# Patient Record
Sex: MALE | Race: WHITE | NOT HISPANIC OR LATINO | Employment: OTHER | URBAN - METROPOLITAN AREA
[De-identification: names, ages, dates, MRNs, and addresses within clinical notes are randomized per-mention and may not be internally consistent; named-entity substitution may affect disease eponyms.]

---

## 2021-12-20 LAB — HBA1C MFR BLD: 7.5 % (ref 0–5.6)

## 2022-03-09 ENCOUNTER — TELEPHONE (OUTPATIENT)
Dept: SCHEDULING | Facility: IMAGING CENTER | Age: 77
End: 2022-03-09

## 2022-04-11 ENCOUNTER — OFFICE VISIT (OUTPATIENT)
Dept: MEDICAL GROUP | Facility: PHYSICIAN GROUP | Age: 77
End: 2022-04-11
Payer: MEDICARE

## 2022-04-11 VITALS
WEIGHT: 131 LBS | DIASTOLIC BLOOD PRESSURE: 70 MMHG | OXYGEN SATURATION: 95 % | HEART RATE: 106 BPM | BODY MASS INDEX: 20.56 KG/M2 | SYSTOLIC BLOOD PRESSURE: 138 MMHG | RESPIRATION RATE: 18 BRPM | HEIGHT: 67 IN | TEMPERATURE: 98.5 F

## 2022-04-11 DIAGNOSIS — L40.50 PSORIATIC ARTHRITIS (HCC): Primary | ICD-10-CM

## 2022-04-11 DIAGNOSIS — G47.52 REM SLEEP BEHAVIOR DISORDER: ICD-10-CM

## 2022-04-11 DIAGNOSIS — E78.2 MIXED HYPERLIPIDEMIA: ICD-10-CM

## 2022-04-11 DIAGNOSIS — H61.23 BILATERAL IMPACTED CERUMEN: ICD-10-CM

## 2022-04-11 DIAGNOSIS — L60.0 INGROWN TOENAIL OF RIGHT FOOT: ICD-10-CM

## 2022-04-11 DIAGNOSIS — E11.9 TYPE 2 DIABETES MELLITUS WITHOUT COMPLICATION, WITHOUT LONG-TERM CURRENT USE OF INSULIN (HCC): ICD-10-CM

## 2022-04-11 DIAGNOSIS — Z79.899 HIGH RISK MEDICATION USE: ICD-10-CM

## 2022-04-11 PROBLEM — H61.20 CERUMEN IMPACTION: Status: ACTIVE | Noted: 2022-04-11

## 2022-04-11 PROCEDURE — 99214 OFFICE O/P EST MOD 30 MIN: CPT | Performed by: NURSE PRACTITIONER

## 2022-04-11 ASSESSMENT — PATIENT HEALTH QUESTIONNAIRE - PHQ9: CLINICAL INTERPRETATION OF PHQ2 SCORE: 0

## 2022-04-11 NOTE — ASSESSMENT & PLAN NOTE
Chronic condition, stable.  Patient indicates that he has an ingrown toenail that comes and goes.  This is affecting his right great toe.  Patient would like to establish with podiatry, and referral was placed today.

## 2022-04-12 PROBLEM — E78.2 MIXED HYPERLIPIDEMIA: Status: ACTIVE | Noted: 2022-04-12

## 2022-04-12 RX ORDER — GLIMEPIRIDE 2 MG/1
2 TABLET ORAL
COMMUNITY
End: 2022-09-26 | Stop reason: SDUPTHER

## 2022-04-12 RX ORDER — INFLIXIMAB 100 MG/10ML
600 INJECTION, POWDER, LYOPHILIZED, FOR SOLUTION INTRAVENOUS
COMMUNITY

## 2022-04-12 RX ORDER — MELATONIN 10 MG
20 CAPSULE ORAL NIGHTLY
COMMUNITY

## 2022-04-12 RX ORDER — FOLIC ACID 1 MG/1
1 TABLET ORAL DAILY
COMMUNITY
End: 2022-05-12 | Stop reason: SDUPTHER

## 2022-04-12 RX ORDER — LORATADINE 10 MG/1
10 TABLET ORAL DAILY
COMMUNITY
End: 2022-06-28

## 2022-04-12 RX ORDER — ASPIRIN 81 MG/1
81 TABLET, CHEWABLE ORAL DAILY
COMMUNITY
End: 2024-01-16

## 2022-04-12 RX ORDER — CLONAZEPAM 1 MG/1
1 TABLET ORAL NIGHTLY
COMMUNITY
End: 2022-08-18

## 2022-04-12 RX ORDER — OMEPRAZOLE 20 MG/1
20 CAPSULE, DELAYED RELEASE ORAL DAILY
COMMUNITY
End: 2023-05-02 | Stop reason: SDUPTHER

## 2022-04-12 RX ORDER — CALCIUM POLYCARBOPHIL 625 MG 625 MG/1
625 TABLET ORAL DAILY
COMMUNITY
End: 2022-06-28

## 2022-04-12 RX ORDER — PRAVASTATIN SODIUM 20 MG
20 TABLET ORAL NIGHTLY
COMMUNITY
End: 2022-09-26 | Stop reason: SDUPTHER

## 2022-04-12 RX ORDER — SILDENAFIL CITRATE 20 MG/1
20 TABLET ORAL PRN
COMMUNITY
End: 2022-11-02 | Stop reason: SDUPTHER

## 2022-04-12 RX ORDER — CYCLOSPORINE 100 MG/1
100 CAPSULE, LIQUID FILLED ORAL DAILY
COMMUNITY
End: 2022-06-28

## 2022-04-12 RX ORDER — M-VIT,TX,IRON,MINS/CALC/FOLIC 27MG-0.4MG
1 TABLET ORAL DAILY
COMMUNITY
End: 2024-01-16

## 2022-04-12 NOTE — ASSESSMENT & PLAN NOTE
Chronic condition, stable.  Patient takes pravastatin 20 mg daily.  He tolerates this without adverse side effect.  Patient reports he is unsure whether he has high cholesterol as believes his labs are within normal limits.  He is due for labs, and these were ordered today.  We will continue his current regimen.

## 2022-04-12 NOTE — ASSESSMENT & PLAN NOTE
Chronic condition, stable.  Patient reports that he has constant bilateral cerumen impaction.  He was established with an ENT in [place] who manages this, and performed routine ear irrigations.  He would like to be referred to ENT in this area to establish, and order was placed for him today.

## 2022-04-12 NOTE — PROGRESS NOTES
Subjective:     CC: The primary encounter diagnosis was Psoriatic arthritis (HCC). Diagnoses of Type 2 diabetes mellitus without complication, without long-term current use of insulin (HCC), REM sleep behavior disorder, Bilateral impacted cerumen, Ingrown toenail of right foot, Mixed hyperlipidemia, and High risk medication use were also pertinent to this visit.      HPI:   Phuc is a new patient to me today wanting to establish care. He and his wife have recently relocated here from [facility] last week.  We discussed the following problems:    1. Psoriatic arthritis (HCC)  Chronic condition, stable.  Patient was followed by rheumatology in [place], needs referral for transfer of care to rheumatology here.    2. Type 2 diabetes mellitus without complication, without long-term current use of insulin (HCC)  Chronic condition, stable.  Patient here for evaluation today.    3. REM sleep behavior disorder  Chronic condition, stable.  Patient was followed by sleep medicine, neurology, and psychology in [place], needs referral for specialty management here.    4. Bilateral impacted cerumen  Chronic condition, stable.  Patient has established relationship with ENT in [place] for management of chronic cerumen impaction, needs referral for ENT here.    5. Ingrown toenail of right foot  Chronic condition, stable.  Patient has chronic ingrown right great toenail, and needs referral to podiatry here.    6. Mixed hyperlipidemia  Chronic condition, stable.  Patient here for evaluation today.    7. High risk medication use  Lab orders placed for surveillance of high risk medication use.       Past Medical History:   Diagnosis Date   • Diabetes (HCC)    • Psoriatic arthritis (HCC)    • REM sleep behavior disorder        Social History     Tobacco Use   • Smoking status: Former Smoker   • Smokeless tobacco: Never Used   • Tobacco comment: quit in 1994   Vaping Use   • Vaping Use: Never used   Substance Use Topics   •  Alcohol use: Yes     Comment: occasional glass of wine   • Drug use: Never       Current Outpatient Medications Ordered in Epic   Medication Sig Dispense Refill   • glimepiride (AMARYL) 2 MG Tab Take 2 mg by mouth 2 (two) times a day.     • methotrexate 2.5 MG Tab Take 7.5 mg by mouth every 7 days.     • pravastatin (PRAVACHOL) 20 MG Tab Take 20 mg by mouth every evening.     • cycloSPORINE-modified (NEORAL) 100 MG Cap Take 100 mg by mouth every day. Except on Saturday     • inFLIXimab (REMICADE) 100 MG Recon Soln Infuse 600 mg into a venous catheter every 8 weeks.     • folic acid (FOLVITE) 1 MG Tab Take 1 mg by mouth every day.     • clonazePAM (KLONOPIN) 1 MG Tab Take 1 mg by mouth every evening. REM sleep disorder     • therapeutic multivitamin-minerals (THERAGRAN-M) Tab Take 1 Tablet by mouth every day.     • calcium polycarbophil (FIBERCON) 625 MG Tab Take 625 mg by mouth every day.     • omeprazole (PRILOSEC) 20 MG delayed-release capsule Take 20 mg by mouth every day.     • aspirin (ASA) 81 MG Chew Tab chewable tablet Chew 81 mg every day.     • loratadine (CLARITIN) 10 MG Tab Take 10 mg by mouth every day.     • Melatonin 10 MG Cap Take 20 mg by mouth every evening.     • Acetaminophen (TYLENOL ARTHRITIS PAIN PO) Take 1,250 mg by mouth every day.     • metformin (GLUCOPHAGE) 1000 MG tablet Take 1,000 mg by mouth 2 times a day with meals.     • sildenafil (REVATIO) 20 MG tablet Take 20 mg by mouth as needed. Three as needed       No current Epic-ordered facility-administered medications on file.       Allergies:  Patient has no known allergies.    Health Maintenance: Deferred until records can be obtained from [facility].    ROS:  Gen: no fevers/chills, no changes in weight  Eyes: no changes in vision  ENT: no sore throat, no hearing loss, no bloody nose  Pulm: no sob, no cough  CV: no chest pain, no palpitations  GI: no nausea/vomiting, no diarrhea  : no dysuria  MSk: no myalgias  Skin: no  "rash  Neuro: no headaches, no numbness/tingling  Heme/Lymph: no easy bruising      Objective:       Exam:  /70 (BP Location: Right arm, Patient Position: Sitting, BP Cuff Size: Adult)   Pulse (!) 106   Temp 36.9 °C (98.5 °F) (Temporal)   Resp 18   Ht 1.702 m (5' 7\")   Wt 59.4 kg (131 lb)   SpO2 95%   BMI 20.52 kg/m²  Body mass index is 20.52 kg/m².    Gen: Alert and oriented, No apparent distress.  Neck: Neck is supple without lymphadenopathy.  No carotid bruits.  Lungs: Normal effort, CTA bilaterally, no wheezes, rhonchi, or rales  CV: Regular rate and rhythm. No murmurs, rubs, or gallops.  Ext: No clubbing, cyanosis, edema.    Labs: None available.    Assessment & Plan:     76 y.o. male with the following -     Ingrown toenail of right foot  Chronic condition, stable.  Patient indicates that he has an ingrown toenail that comes and goes.  This is affecting his right great toe.  Patient would like to establish with podiatry, and referral was placed today.    Cerumen impaction  Chronic condition, stable.  Patient reports that he has constant bilateral cerumen impaction.  He was established with an ENT in [place] who manages this, and performed routine ear irrigations.  He would like to be referred to ENT in this area to establish, and order was placed for him today.    REM sleep behavior disorder  Chronic condition, stable.  Patient has a long history of REM sleep behavior disorder.  He states that he acts out his dreams.  He has been managed by sleep medicine, neurology, and psychiatry specialties.  He is on a stable regimen of clonazepam 1 mg and melatonin 20 mg at bedtime.  He would like to be referred to specialty for management of this today.  He does have approximately 30 days worth of clonazepam left at this time.  Discussed that I will refill this for him if he cannot get into specialty prior.  I did advise patient that if I do need to refill his clonazepam, he will need to make a separate " appointment for that and signed controlled substance consents.  Phone call was made to our sleep center, who reports this is not a condition they manage.  Referral was placed to psychiatry.    Psoriatic arthritis (HCC)  Chronic condition, stable.  Patient reports an over 10-year history of psoriatic arthritis.  He states he does well with his current regimen of methotrexate 7.5 mg weekly, as well as IV infusions of Remicade 600 mg.  Patient states he was on Remicade 700 mg, but dose was reduced due to weight loss.  His next infusion is due on May 5.  Patient does report that he needs to get into rheumatology soon as possible due to this.  We did discuss that this can be a difficult specialty to get into in this area, however there is no availability through [facility] rheumatology, and I will base an urgent referral for him today.  Patient has recently relocated from the [place] area, and does not have his health records with him at this time.  There are prior rheumatologist does participate in [employer] and care everywhere application, and as soon as we figure out how to share information, his records should be available to us.  Patient believes it has been approximately 3 months or little longer since monitoring labs were done.  These orders were placed today.  I did tell patient I can order his Remicade at our infusion center if we cannot get him into see rheumatology in time.  Patient will continue current regimen.    Addendum: Since the time that I saw this patient in this dictation, the rheumatology office has reached out to let me know that they cannot see patient without records.  If I cannot get his records, they would like me to do a standard rheumatology work-up as well as x-rays of all of his affected joints.  Will call patient and let him know.    Type 2 diabetes mellitus without complication, without long-term current use of insulin (HCC)  Chronic condition, stable.  Patient takes glimepiride 2 mg twice  daily, and Metformin 1000 mg twice daily.  He believes his last hemoglobin A1c was 7.4.  He also thinks this was a little over 3 months ago. Discussed that I would like to see his A1c under 7.  Discussed with patient that he is due for updated labs, and depending on his next A1c we may need to make a medication adjustment.  Also discussed with patient that we do have an excellent pharmacotherapy service that could see him in this location for management of his diabetes medications.  Patient will follow-up after his labs are available and we can make further determination at that time.  Continue his current regimen.    Mixed hyperlipidemia  Chronic condition, stable.  Patient takes pravastatin 20 mg daily.  He tolerates this without adverse side effect.  Patient reports he is unsure whether he has high cholesterol as believes his labs are within normal limits.  He is due for labs, and these were ordered today.  We will continue his current regimen.      Orders Placed This Encounter   • Comp Metabolic Panel   • HEMOGLOBIN A1C   • Lipid Profile   • CBC WITH DIFFERENTIAL   • Referral to Rheumatology   • Referral to ENT   • Referral to Podiatry   • Referral to Ophthalmology   • Referral to Psychiatry   • glimepiride (AMARYL) 2 MG Tab   • methotrexate 2.5 MG Tab   • pravastatin (PRAVACHOL) 20 MG Tab   • cycloSPORINE-modified (NEORAL) 100 MG Cap   • inFLIXimab (REMICADE) 100 MG Recon Soln   • folic acid (FOLVITE) 1 MG Tab   • clonazePAM (KLONOPIN) 1 MG Tab   • therapeutic multivitamin-minerals (THERAGRAN-M) Tab   • calcium polycarbophil (FIBERCON) 625 MG Tab   • omeprazole (PRILOSEC) 20 MG delayed-release capsule   • aspirin (ASA) 81 MG Chew Tab chewable tablet   • loratadine (CLARITIN) 10 MG Tab   • Melatonin 10 MG Cap   • Acetaminophen (TYLENOL ARTHRITIS PAIN PO)   • metformin (GLUCOPHAGE) 1000 MG tablet   • sildenafil (REVATIO) 20 MG tablet          Return in about 3 months (around 7/11/2022).    I have placed the below  orders and discussed them with an approved delegating provider.  The MA is performing the below orders under the direction of Ingris Medrano MD.    Please note that this dictation was created using voice recognition software. I have made every reasonable attempt to correct obvious errors, but I expect that there are errors of grammar and possibly content that I did not discover before finalizing the note.

## 2022-04-12 NOTE — ASSESSMENT & PLAN NOTE
Chronic condition, stable.  Patient has a long history of REM sleep behavior disorder.  He states that he acts out his dreams.  He has been managed by sleep medicine, neurology, and psychiatry specialties.  He is on a stable regimen of clonazepam 1 mg and melatonin 20 mg at bedtime.  He would like to be referred to specialty for management of this today.  He does have approximately 30 days worth of clonazepam left at this time.  Discussed that I will refill this for him if he cannot get into specialty prior.  I did advise patient that if I do need to refill his clonazepam, he will need to make a separate appointment for that and signed controlled substance consents.  Phone call was made to our sleep center, who reports this is not a condition they manage.  Referral was placed to psychiatry.

## 2022-04-12 NOTE — ASSESSMENT & PLAN NOTE
Chronic condition, stable.  Patient takes glimepiride 2 mg twice daily, and Metformin 1000 mg twice daily.  He believes his last hemoglobin A1c was 7.4.  He also thinks this was a little over 3 months ago. Discussed that I would like to see his A1c under 7.  Discussed with patient that he is due for updated labs, and depending on his next A1c we may need to make a medication adjustment.  Also discussed with patient that we do have an excellent pharmacotherapy service that could see him in this location for management of his diabetes medications.  Patient will follow-up after his labs are available and we can make further determination at that time.  Continue his current regimen.

## 2022-04-12 NOTE — ASSESSMENT & PLAN NOTE
Chronic condition, stable.  Patient reports an over 10-year history of psoriatic arthritis.  He states he does well with his current regimen of methotrexate 7.5 mg weekly, as well as IV infusions of Remicade 600 mg.  Patient states he was on Remicade 700 mg, but dose was reduced due to weight loss.  His next infusion is due on May 5.  Patient does report that he needs to get into rheumatology soon as possible due to this.  We did discuss that this can be a difficult specialty to get into in this area, however there is no availability through [facility] rheumatology, and I will base an urgent referral for him today.  Patient has recently relocated from the [place] area, and does not have his health records with him at this time.  There are prior rheumatologist does participate in [employer] and care everywhere application, and as soon as we figure out how to share information, his records should be available to us.  Patient believes it has been approximately 3 months or little longer since monitoring labs were done.  These orders were placed today.  I did tell patient I can order his Remicade at our infusion center if we cannot get him into see rheumatology in time.  Patient will continue current regimen.    Addendum: Since the time that I saw this patient in this dictation, the rheumatology office has reached out to let me know that they cannot see patient without records.  If I cannot get his records, they would like me to do a standard rheumatology work-up as well as x-rays of all of his affected joints.  Will call patient and let him know.

## 2022-04-15 ENCOUNTER — HOSPITAL ENCOUNTER (OUTPATIENT)
Dept: LAB | Facility: MEDICAL CENTER | Age: 77
End: 2022-04-15
Attending: NURSE PRACTITIONER
Payer: MEDICARE

## 2022-04-15 DIAGNOSIS — Z79.899 HIGH RISK MEDICATION USE: ICD-10-CM

## 2022-04-15 DIAGNOSIS — E78.2 MIXED HYPERLIPIDEMIA: ICD-10-CM

## 2022-04-15 DIAGNOSIS — E11.9 TYPE 2 DIABETES MELLITUS WITHOUT COMPLICATION, WITHOUT LONG-TERM CURRENT USE OF INSULIN (HCC): ICD-10-CM

## 2022-04-15 LAB
ALBUMIN SERPL BCP-MCNC: 4.4 G/DL (ref 3.2–4.9)
ALBUMIN/GLOB SERPL: 1.4 G/DL
ALP SERPL-CCNC: 52 U/L (ref 30–99)
ALT SERPL-CCNC: 20 U/L (ref 2–50)
ANION GAP SERPL CALC-SCNC: 15 MMOL/L (ref 7–16)
AST SERPL-CCNC: 24 U/L (ref 12–45)
BASOPHILS # BLD AUTO: 0.7 % (ref 0–1.8)
BASOPHILS # BLD: 0.04 K/UL (ref 0–0.12)
BILIRUB SERPL-MCNC: 0.6 MG/DL (ref 0.1–1.5)
BUN SERPL-MCNC: 28 MG/DL (ref 8–22)
CALCIUM SERPL-MCNC: 10.2 MG/DL (ref 8.5–10.5)
CHLORIDE SERPL-SCNC: 105 MMOL/L (ref 96–112)
CHOLEST SERPL-MCNC: 140 MG/DL (ref 100–199)
CO2 SERPL-SCNC: 21 MMOL/L (ref 20–33)
CREAT SERPL-MCNC: 1.41 MG/DL (ref 0.5–1.4)
EOSINOPHIL # BLD AUTO: 0.11 K/UL (ref 0–0.51)
EOSINOPHIL NFR BLD: 1.9 % (ref 0–6.9)
ERYTHROCYTE [DISTWIDTH] IN BLOOD BY AUTOMATED COUNT: 49.3 FL (ref 35.9–50)
EST. AVERAGE GLUCOSE BLD GHB EST-MCNC: 151 MG/DL
GFR SERPLBLD CREATININE-BSD FMLA CKD-EPI: 52 ML/MIN/1.73 M 2
GLOBULIN SER CALC-MCNC: 3.1 G/DL (ref 1.9–3.5)
GLUCOSE SERPL-MCNC: 125 MG/DL (ref 65–99)
HBA1C MFR BLD: 6.9 % (ref 4–5.6)
HCT VFR BLD AUTO: 42.4 % (ref 42–52)
HDLC SERPL-MCNC: 50 MG/DL
HGB BLD-MCNC: 13.6 G/DL (ref 14–18)
IMM GRANULOCYTES # BLD AUTO: 0.02 K/UL (ref 0–0.11)
IMM GRANULOCYTES NFR BLD AUTO: 0.3 % (ref 0–0.9)
LDLC SERPL CALC-MCNC: 72 MG/DL
LYMPHOCYTES # BLD AUTO: 2.56 K/UL (ref 1–4.8)
LYMPHOCYTES NFR BLD: 43.7 % (ref 22–41)
MCH RBC QN AUTO: 30.8 PG (ref 27–33)
MCHC RBC AUTO-ENTMCNC: 32.1 G/DL (ref 33.7–35.3)
MCV RBC AUTO: 95.9 FL (ref 81.4–97.8)
MONOCYTES # BLD AUTO: 0.73 K/UL (ref 0–0.85)
MONOCYTES NFR BLD AUTO: 12.5 % (ref 0–13.4)
NEUTROPHILS # BLD AUTO: 2.4 K/UL (ref 1.82–7.42)
NEUTROPHILS NFR BLD: 40.9 % (ref 44–72)
NRBC # BLD AUTO: 0 K/UL
NRBC BLD-RTO: 0 /100 WBC
PLATELET # BLD AUTO: 245 K/UL (ref 164–446)
PMV BLD AUTO: 9.9 FL (ref 9–12.9)
POTASSIUM SERPL-SCNC: 4.9 MMOL/L (ref 3.6–5.5)
PROT SERPL-MCNC: 7.5 G/DL (ref 6–8.2)
RBC # BLD AUTO: 4.42 M/UL (ref 4.7–6.1)
SODIUM SERPL-SCNC: 141 MMOL/L (ref 135–145)
TRIGL SERPL-MCNC: 91 MG/DL (ref 0–149)
WBC # BLD AUTO: 5.9 K/UL (ref 4.8–10.8)

## 2022-04-15 PROCEDURE — 80061 LIPID PANEL: CPT

## 2022-04-15 PROCEDURE — 36415 COLL VENOUS BLD VENIPUNCTURE: CPT

## 2022-04-15 PROCEDURE — 80053 COMPREHEN METABOLIC PANEL: CPT

## 2022-04-15 PROCEDURE — 83036 HEMOGLOBIN GLYCOSYLATED A1C: CPT | Mod: GA

## 2022-04-15 PROCEDURE — 85025 COMPLETE CBC W/AUTO DIFF WBC: CPT

## 2022-04-17 PROBLEM — Z79.899 HIGH RISK MEDICATION USE: Status: ACTIVE | Noted: 2022-04-17

## 2022-04-17 NOTE — PATIENT INSTRUCTIONS
AFTER VISIT INSTRUCTIONS    Below are important information to help you navigate your healthcare needs and help us serve you safely and effectively:  • Increase methotrexate from 7.5mg to 15mg weekly, then 4 weeks later, stop cyclosporine.  • If laboratory tests and/or imaging studies were ordered, remember to go get them done.  • If new prescriptions or refills were sent to the pharmacy, remember to go pick them up.  • Take your medications exactly as prescribed unless instructed otherwise.  • Follow up with your primary care provider and/or specialists as scheduled.  • If there are significant findings from your lab tests and imaging studies, I will notify you with explanations via [employer] or phone call, otherwise you can view them on [employer] and let me know if you have any questions.  • Sign up for [employer] if you have not already done so, in order to have access to the results of your lab tests and imaging studies, and to be able to send and receive messages from me.  • Please note that [employer] messaging is for non-urgent matters that do not require immediate attention and are typically read only during office hours, so do not expect immediate responses from me.

## 2022-04-17 NOTE — PROGRESS NOTES
[facility]  [address], [address], [place] [address]  Phone: [phone] / Fax: [phone]    RHEUMATOLOGY NEW PATIENT VISIT NOTE      DATE OF SERVICE: 04/19/2022    REFERRING PROVIDER:  JONE Porras  [address] [place] [address]      SUBJECTIVE:     CHIEF COMPLAINT:   Chief Complaint   Patient presents with   • New Patient     Psoriatic arthritis        HISTORY OF PRESENT ILLNESS:  Phuc Mcdowell is a 76 y.o. male with pertinent history notable for psoriasis many years before psoriatic arthritis diagnosed in 2000. He was previously under the care of a rheumatologist in [place], [place] where he moved from about 2 weeks ago and comes to establish care. Treatments have included methotrexate 7.5mg weekly, cyclosporine 100mg daily, and infliximab 600mg every 8 weeks (last infusion in 3/10/22) with good disease control. Reports no new skin plaques and only minimal joint pain in hands (mostly DIPs) toward the end of infliximab infusion cycle. This is sometimes associated with morning stiffness lasting only 30 minutes and improving with activity. He also notes some weight loss due to changes in his diet and physical activity.  Pertinent labs as of 4/2022: Normal ESR 21 and CRP 0.2 in 2/2022; elevated Cr 1.41 with GFR 52 with otherwise unremarkable CMP and CBC in 4/2022.  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 XR shoulders - mild osteoarthritis of bilateral AC joints and left GH joint.      REVIEW OF SYSTEMS:  Except as noted in the history above, a complete review of systems with emphasis on autoimmune inflammatory conditions was otherwise negative for any significant symptoms.      ACTIVE PROBLEM LIST:  Patient Active Problem List   Diagnosis   • Cerumen impaction   • REM sleep behavior disorder   • Psoriatic arthritis (HCC)   • Ingrown toenail of right foot   • Type 2 diabetes  mellitus without complication, without long-term current use of insulin (HCC)   • Mixed hyperlipidemia   • High risk medication use   • Plaque psoriasis   No problem-specific Assessment & Plan notes found for this encounter.      PAST MEDICAL HISTORY:  Past Medical History:   Diagnosis Date   • Diabetes (HCC)    • Psoriatic arthritis (HCC)    • REM sleep behavior disorder        PAST SURGICAL HISTORY:  Past Surgical History:   Procedure Laterality Date   • CATARACT EXTRACTION WITH IOL Bilateral 2016       MEDICATIONS:  Current Outpatient Medications   Medication Sig Dispense Refill   • glimepiride (AMARYL) 2 MG Tab Take 2 mg by mouth 2 (two) times a day.     • methotrexate 2.5 MG Tab Take 7.5 mg by mouth every 7 days.     • pravastatin (PRAVACHOL) 20 MG Tab Take 20 mg by mouth every evening.     • cycloSPORINE-modified (NEORAL) 100 MG Cap Take 100 mg by mouth every day. Except on Saturday     • inFLIXimab (REMICADE) 100 MG Recon Soln Infuse 600 mg into a venous catheter every 8 weeks.     • folic acid (FOLVITE) 1 MG Tab Take 1 mg by mouth every day.     • clonazePAM (KLONOPIN) 1 MG Tab Take 1 mg by mouth every evening. REM sleep disorder     • therapeutic multivitamin-minerals (THERAGRAN-M) Tab Take 1 Tablet by mouth every day.     • calcium polycarbophil (FIBERCON) 625 MG Tab Take 625 mg by mouth every day.     • omeprazole (PRILOSEC) 20 MG delayed-release capsule Take 20 mg by mouth every day.     • aspirin (ASA) 81 MG Chew Tab chewable tablet Chew 81 mg every day.     • loratadine (CLARITIN) 10 MG Tab Take 10 mg by mouth every day.     • Melatonin 10 MG Cap Take 20 mg by mouth every evening.     • Acetaminophen (TYLENOL ARTHRITIS PAIN PO) Take 1,250 mg by mouth every day.     • metformin (GLUCOPHAGE) 1000 MG tablet Take 1,000 mg by mouth 2 times a day with meals.     • sildenafil (REVATIO) 20 MG tablet Take 20 mg by mouth as needed. Three as needed       No current facility-administered medications for this  "visit.       ALLERGIES:   No Known Allergies      IMMUNIZATIONS:  Immunization History   Administered Date(s) Administered   • Moderna SARS-CoV-2 Vaccine 2021, 2021, 2021       SOCIAL HISTORY:   Social History     Tobacco Use   • Smoking status: Former Smoker   • Smokeless tobacco: Never Used   • Tobacco comment: quit in    Vaping Use   • Vaping Use: Never used   Substance Use Topics   • Alcohol use: Yes     Comment: occasional glass of wine   • Drug use: Never       FAMILY HISTORY:  Family History   Problem Relation Age of Onset   • Cancer Mother         lung and brain -  at 88   • Heart Disease Father         passed at 62   • Hypertension Sister    • Hypertension Brother         OBJECTIVE:     Vital Signs: /70 (BP Location: Left arm, Patient Position: Sitting, BP Cuff Size: Adult)   Pulse 93   Temp 36.7 °C (98.1 °F) (Temporal)   Resp 16   Ht 1.702 m (5' 7\")   Wt 60.1 kg (132 lb 9.6 oz)   SpO2 97% Body mass index is 20.77 kg/m².    General: Appears well and comfortable; no acute distress  Eyes: Extraocular muscles and vision intact; no conjunctival lesions  ENT: Oral mucosa pink and moist; no oral or nasal lesions  Head/Neck: No scalp lesions; neck supple; no cervical lymphadenopathy  Cardiovascular: Regular rate and rhythm; no murmurs  Respiratory: Clear to auscultation bilaterally; no wheezes, rales, or rhonchi  Gastrointestinal: Abdomen soft and non-tender; no masses or organomegaly  Integumentary: Skin soft and supple; no significant cutaneous lesions  Musculoskeletal: No significant joint tenderness, swelling, warmth, erythema, or signs of synovitis; no significant restriction in ROM  Neurologic: No focal sensory or motor deficits  Psychiatric: Mood and affect appropriate      LABORATORY RESULTS REVIEWED AND INTERPRETED BY ME:  Lab Results   Component Value Date/Time    WBC 5.9 04/15/2022 08:15 AM    RBC 4.42 (L) 04/15/2022 08:15 AM    HEMOGLOBIN 13.6 (L) 04/15/2022 08:15 " AM    HEMATOCRIT 42.4 04/15/2022 08:15 AM    MCV 95.9 04/15/2022 08:15 AM    MCH 30.8 04/15/2022 08:15 AM    MCHC 32.1 (L) 04/15/2022 08:15 AM    RDW 49.3 04/15/2022 08:15 AM    PLATELETCT 245 04/15/2022 08:15 AM    MPV 9.9 04/15/2022 08:15 AM    NEUTS 2.40 04/15/2022 08:15 AM    LYMPHOCYTES 43.70 (H) 04/15/2022 08:15 AM    MONOCYTES 12.50 04/15/2022 08:15 AM    EOSINOPHILS 1.90 04/15/2022 08:15 AM    BASOPHILS 0.70 04/15/2022 08:15 AM     Lab Results   Component Value Date/Time    SODIUM 141 04/15/2022 08:15 AM    POTASSIUM 4.9 04/15/2022 08:15 AM    CHLORIDE 105 04/15/2022 08:15 AM    CO2 21 04/15/2022 08:15 AM    GLUCOSE 125 (H) 04/15/2022 08:15 AM    BUN 28 (H) 04/15/2022 08:15 AM    CREATININE 1.41 (H) 04/15/2022 08:15 AM     Lab Results   Component Value Date/Time    ASTSGOT 24 04/15/2022 08:15 AM    ALTSGPT 20 04/15/2022 08:15 AM    ALKPHOSPHAT 52 04/15/2022 08:15 AM    TBILIRUBIN 0.6 04/15/2022 08:15 AM    TOTPROTEIN 7.5 04/15/2022 08:15 AM    ALBUMIN 4.4 04/15/2022 08:15 AM    CALCIUM 10.2 04/15/2022 08:15 AM     Lab Results   Component Value Date/Time    CHOLSTRLTOT 140 04/15/2022 08:15 AM    LDL 72 04/15/2022 08:15 AM    HDL 50 04/15/2022 08:15 AM    TRIGLYCERIDE 91 04/15/2022 08:15 AM    HBA1C 6.9 (H) 04/15/2022 08:15 AM       RADIOLOGY RESULTS REVIEWED AND INTERPRETED BY ME:    Results for orders placed in visit on 04/12/22    DX-PELVIS-1 OR 2 VIEWS    Results for orders placed in visit on 04/12/22    DX-SHOULDER 2+    All relevant laboratory and imaging results reported on this note were reviewed and interpreted by me.      ASSESSMENT AND PLAN:     Phuc Mcdowell is a 76 y.o. male with history as noted above whose presentation merits the following diagnostic and clinical status impressions and recommendations:    1. Psoriatic arthritis (HCC)  Clinical picture suggests inactive or relatively low disease activity that is well-controlled on the current regimen, so no need for escalation of treatment at  this time. Instead, would recommend consolidating his treatment by optimizing the dose of methotrexate and discontinuing cyclosporine given its associated risk of nephrotoxicity.  - Increase methotrexate from 7.5mg to 15mg weekly, then 4 weeks later, stop cyclosporine  - Continue infliximab 600mg every 8 weeks (ordered on paper and sent over to infusion center)    2. Plaque psoriasis  Well-controlled on the current regimen.  - Continue treatment as above.    3. High risk medication use  Mild renal insufficiency raising concern for possible cylcosporine-associated nephrotoxicity.  - Plan to discontinue cyclosporine as noted above    FOLLOW-UP: Return in about 3 months (around 7/19/2022) for Short.           Thank you for giving me the opportunity to participate in the care of Phuc Mcdowell.    Piero Lewis MD, MS  Rheumatologist, [facility]  , Department of Internal Medicine  [facility]

## 2022-04-19 ENCOUNTER — OFFICE VISIT (OUTPATIENT)
Dept: RHEUMATOLOGY | Facility: MEDICAL CENTER | Age: 77
End: 2022-04-19
Payer: MEDICARE

## 2022-04-19 VITALS
WEIGHT: 132.6 LBS | DIASTOLIC BLOOD PRESSURE: 70 MMHG | RESPIRATION RATE: 16 BRPM | TEMPERATURE: 98.1 F | SYSTOLIC BLOOD PRESSURE: 124 MMHG | HEIGHT: 67 IN | OXYGEN SATURATION: 97 % | HEART RATE: 93 BPM | BODY MASS INDEX: 20.81 KG/M2

## 2022-04-19 DIAGNOSIS — L40.0 PLAQUE PSORIASIS: ICD-10-CM

## 2022-04-19 DIAGNOSIS — Z79.899 HIGH RISK MEDICATION USE: ICD-10-CM

## 2022-04-19 DIAGNOSIS — L40.50 PSORIATIC ARTHRITIS (HCC): ICD-10-CM

## 2022-04-19 PROCEDURE — 99204 OFFICE O/P NEW MOD 45 MIN: CPT | Performed by: STUDENT IN AN ORGANIZED HEALTH CARE EDUCATION/TRAINING PROGRAM

## 2022-04-19 ASSESSMENT — FIBROSIS 4 INDEX: FIB4 SCORE: 1.62

## 2022-04-25 RX ORDER — SODIUM CHLORIDE 9 MG/ML
INJECTION, SOLUTION INTRAVENOUS CONTINUOUS
Status: CANCELLED | OUTPATIENT
Start: 2022-05-05

## 2022-05-06 ENCOUNTER — OUTPATIENT INFUSION SERVICES (OUTPATIENT)
Dept: ONCOLOGY | Facility: MEDICAL CENTER | Age: 77
End: 2022-05-06
Attending: STUDENT IN AN ORGANIZED HEALTH CARE EDUCATION/TRAINING PROGRAM
Payer: MEDICARE

## 2022-05-06 VITALS
HEART RATE: 94 BPM | SYSTOLIC BLOOD PRESSURE: 130 MMHG | WEIGHT: 134.48 LBS | RESPIRATION RATE: 18 BRPM | OXYGEN SATURATION: 97 % | BODY MASS INDEX: 21.11 KG/M2 | TEMPERATURE: 97.5 F | DIASTOLIC BLOOD PRESSURE: 73 MMHG | HEIGHT: 67 IN

## 2022-05-06 DIAGNOSIS — L40.50 PSORIATIC ARTHRITIS (HCC): ICD-10-CM

## 2022-05-06 LAB
HBV CORE AB SERPL QL IA: NONREACTIVE
HBV SURFACE AG SER QL: NORMAL

## 2022-05-06 PROCEDURE — 700105 HCHG RX REV CODE 258: Performed by: STUDENT IN AN ORGANIZED HEALTH CARE EDUCATION/TRAINING PROGRAM

## 2022-05-06 PROCEDURE — 86704 HEP B CORE ANTIBODY TOTAL: CPT

## 2022-05-06 PROCEDURE — 86480 TB TEST CELL IMMUN MEASURE: CPT

## 2022-05-06 PROCEDURE — 96415 CHEMO IV INFUSION ADDL HR: CPT

## 2022-05-06 PROCEDURE — 96413 CHEMO IV INFUSION 1 HR: CPT

## 2022-05-06 PROCEDURE — 700111 HCHG RX REV CODE 636 W/ 250 OVERRIDE (IP): Mod: JG | Performed by: STUDENT IN AN ORGANIZED HEALTH CARE EDUCATION/TRAINING PROGRAM

## 2022-05-06 PROCEDURE — 87340 HEPATITIS B SURFACE AG IA: CPT

## 2022-05-06 RX ORDER — SODIUM CHLORIDE 9 MG/ML
INJECTION, SOLUTION INTRAVENOUS CONTINUOUS
Status: CANCELLED | OUTPATIENT
Start: 2022-07-01

## 2022-05-06 RX ADMIN — SODIUM CHLORIDE 600 MG: 9 INJECTION, SOLUTION INTRAVENOUS at 14:45

## 2022-05-06 ASSESSMENT — PAIN DESCRIPTION - PAIN TYPE: TYPE: CHRONIC PAIN

## 2022-05-06 ASSESSMENT — FIBROSIS 4 INDEX: FIB4 SCORE: 1.62

## 2022-05-07 NOTE — PROGRESS NOTES
Phuc arrives to \[facility]"" for q 8 week Inflectra for psoriatic arthritis. Patient reports this is NOT his first dose, but his first dose in \[facility]\"". Patient reports he just moved from [place], [place]. Patient oriented to the unit and to the procedure. Questions and concerns answered prior to start of infusion. Patient denies acute health concerns. Denies s/s active infections, open wounds, and mouth sores. Denies receiving live vaccines in the past 4 weeks. 22g PIV placed to LFA, which flushes easily and has brisk blood return. Baseline Hep B screening and TB panel drawn per orders. Inflectra infused, with an inline 0.2 micron filter, over 2 hours as this was patient's first dose in \[facility]\"". Patient tolerated treatment well without adverse s/s. PIV flushed and removed with tip intact. Scheduled future appointments. Discharged home to self care in no apparent distress.

## 2022-05-09 LAB
GAMMA INTERFERON BACKGROUND BLD IA-ACNC: 0.08 IU/ML
M TB IFN-G BLD-IMP: NEGATIVE
M TB IFN-G CD4+ BCKGRND COR BLD-ACNC: 0.02 IU/ML
MITOGEN IGNF BCKGRD COR BLD-ACNC: >10 IU/ML
QFT TB2 - NIL TBQ2: 0.01 IU/ML

## 2022-05-11 ENCOUNTER — PATIENT MESSAGE (OUTPATIENT)
Dept: RHEUMATOLOGY | Facility: MEDICAL CENTER | Age: 77
End: 2022-05-11
Payer: MEDICARE

## 2022-05-11 DIAGNOSIS — L40.50 PSORIATIC ARTHRITIS (HCC): ICD-10-CM

## 2022-05-11 DIAGNOSIS — L40.0 PLAQUE PSORIASIS: ICD-10-CM

## 2022-05-12 RX ORDER — FOLIC ACID 1 MG/1
1 TABLET ORAL DAILY
Qty: 90 TABLET | Refills: 3 | Status: SHIPPED | OUTPATIENT
Start: 2022-05-12 | End: 2023-09-21

## 2022-05-12 RX ORDER — CLONAZEPAM 1 MG/1
1 TABLET ORAL NIGHTLY
Qty: 30 TABLET | Refills: 0 | OUTPATIENT
Start: 2022-05-12 | End: 2022-06-11

## 2022-06-28 ENCOUNTER — HOSPITAL ENCOUNTER (OUTPATIENT)
Facility: MEDICAL CENTER | Age: 77
End: 2022-06-28
Attending: NURSE PRACTITIONER
Payer: MEDICARE

## 2022-06-28 ENCOUNTER — OFFICE VISIT (OUTPATIENT)
Dept: MEDICAL GROUP | Facility: PHYSICIAN GROUP | Age: 77
End: 2022-06-28
Payer: MEDICARE

## 2022-06-28 VITALS
HEART RATE: 92 BPM | BODY MASS INDEX: 21.06 KG/M2 | OXYGEN SATURATION: 95 % | TEMPERATURE: 98.2 F | HEIGHT: 67 IN | DIASTOLIC BLOOD PRESSURE: 78 MMHG | RESPIRATION RATE: 16 BRPM | WEIGHT: 134.2 LBS | SYSTOLIC BLOOD PRESSURE: 114 MMHG

## 2022-06-28 DIAGNOSIS — R30.0 DYSURIA: Primary | ICD-10-CM

## 2022-06-28 DIAGNOSIS — R30.0 DYSURIA: ICD-10-CM

## 2022-06-28 DIAGNOSIS — E11.9 TYPE 2 DIABETES MELLITUS WITHOUT COMPLICATION, WITHOUT LONG-TERM CURRENT USE OF INSULIN (HCC): ICD-10-CM

## 2022-06-28 DIAGNOSIS — G47.52 REM SLEEP BEHAVIOR DISORDER: ICD-10-CM

## 2022-06-28 LAB
APPEARANCE UR: CLEAR
BILIRUB UR STRIP-MCNC: NEGATIVE MG/DL
COLOR UR AUTO: YELLOW
GLUCOSE UR STRIP.AUTO-MCNC: NEGATIVE MG/DL
KETONES UR STRIP.AUTO-MCNC: NEGATIVE MG/DL
LEUKOCYTE ESTERASE UR QL STRIP.AUTO: NEGATIVE
NITRITE UR QL STRIP.AUTO: NEGATIVE
PH UR STRIP.AUTO: 5 [PH] (ref 5–8)
PROT UR QL STRIP: NEGATIVE MG/DL
RBC UR QL AUTO: NEGATIVE
SP GR UR STRIP.AUTO: 1.02
UROBILINOGEN UR STRIP-MCNC: NORMAL MG/DL

## 2022-06-28 PROCEDURE — 81002 URINALYSIS NONAUTO W/O SCOPE: CPT | Performed by: NURSE PRACTITIONER

## 2022-06-28 PROCEDURE — 99214 OFFICE O/P EST MOD 30 MIN: CPT | Performed by: NURSE PRACTITIONER

## 2022-06-28 PROCEDURE — 87086 URINE CULTURE/COLONY COUNT: CPT

## 2022-06-28 ASSESSMENT — FIBROSIS 4 INDEX: FIB4 SCORE: 1.62

## 2022-06-28 NOTE — ASSESSMENT & PLAN NOTE
Chronic condition, stable.  Patient takes clonazepam 1 mg daily for his sleep disorder.  He was referred to sleep disorder clinic at his initial visit with me 4/11/2022, however has not made an appointment.  I had told patient I would refill the clonazepam until sleep disorder clinic could manage his care.  He has recently refilled his last prescription at [facility] which was from his prior provider in [place].  PDMP today does not reveal any medication refills in [place] today.  Patient reports that he will make an appointment with a sleep disorder clinic here.      New Referral was placed to [facility] sleep center and this was changed for him today.

## 2022-06-29 PROBLEM — R30.0 DYSURIA: Status: ACTIVE | Noted: 2022-06-29

## 2022-06-29 PROBLEM — R30.0 DYSURIA: Chronic | Status: ACTIVE | Noted: 2022-06-29

## 2022-06-29 NOTE — ASSESSMENT & PLAN NOTE
"Acute condition, unstable. Patient states that he has noticed that his urination feels \"different.\"  He denies pain with urination, frequency or urgency.  He denies blood in his urine, dark or cloudy urine.   He is getting a Remicade infusion on Friday and he would like to ensure that he does not have a UTI.  POCT UA was negative in the office today, but will send for culture to be sure.  Patient will be advised of results prior to his infusion.  "

## 2022-06-29 NOTE — PROGRESS NOTES
Subjective:     CC: The primary encounter diagnosis was Dysuria. Diagnoses of Type 2 diabetes mellitus without complication, without long-term current use of insulin (HCC) and REM sleep behavior disorder were also pertinent to this visit.      HPI:   Phuc is an established patient of [facility] here for follow-up.  We discussed the following problems:    1. Dysuria  Acute condition, unstable. Patient is here for evaluation today.    2. Type 2 diabetes mellitus without complication, without long-term current use of insulin (HCC)  Chronic condition, stable. Patient is here for follow up today.    3. REM sleep behavior disorder  Chronic condition, stable. Patient is here for follow up today.     Past Medical History:   Diagnosis Date   • Diabetes (HCC)    • Psoriatic arthritis (HCC)    • REM sleep behavior disorder        Social History     Tobacco Use   • Smoking status: Former Smoker   • Smokeless tobacco: Never Used   • Tobacco comment: quit in 1994   Vaping Use   • Vaping Use: Never used   Substance Use Topics   • Alcohol use: Yes     Comment: occasional glass of wine   • Drug use: Never       Current Outpatient Medications Ordered in Epic   Medication Sig Dispense Refill   • methotrexate 2.5 MG Tab Take 6 Tablets by mouth every 7 days. 77 Tablet 3   • folic acid (FOLVITE) 1 MG Tab Take 1 Tablet by mouth every day. 90 Tablet 3   • glimepiride (AMARYL) 2 MG Tab Take 2 mg by mouth 2 (two) times a day.     • pravastatin (PRAVACHOL) 20 MG Tab Take 20 mg by mouth every evening.     • inFLIXimab (REMICADE) 100 MG Recon Soln Infuse 600 mg into a venous catheter every 8 weeks.     • clonazePAM (KLONOPIN) 1 MG Tab Take 1 mg by mouth every evening. REM sleep disorder     • therapeutic multivitamin-minerals (THERAGRAN-M) Tab Take 1 Tablet by mouth every day.     • omeprazole (PRILOSEC) 20 MG delayed-release capsule Take 20 mg by mouth every day.     • aspirin (ASA) 81 MG Chew Tab chewable tablet Chew 81 mg every day.     •  "Melatonin 10 MG Cap Take 20 mg by mouth every evening.     • Acetaminophen (TYLENOL ARTHRITIS PAIN PO) Take 1,250 mg by mouth every day.     • metformin (GLUCOPHAGE) 1000 MG tablet Take 1,000 mg by mouth 2 times a day with meals.     • sildenafil (REVATIO) 20 MG tablet Take 20 mg by mouth as needed. Three as needed       No current Epic-ordered facility-administered medications on file.       Allergies:  Patient has no known allergies.    Health Maintenance:  Patient reports all of his vaccine are current. He moved from [place], requesting records.     ROS:  Gen: no fevers/chills, no changes in weight  Eyes: no changes in vision  ENT: no sore throat, no hearing loss, no bloody nose  Pulm: no sob, no cough  CV: no chest pain, no palpitations  GI: no nausea/vomiting, no diarrhea  : no dysuria  MSk: no myalgias  Skin: no rash  Neuro: no headaches, no numbness/tingling  Heme/Lymph: no easy bruising      Objective:       Exam:  /78 (BP Location: Left arm, Patient Position: Sitting, BP Cuff Size: Adult)   Pulse 92   Temp 36.8 °C (98.2 °F) (Temporal)   Resp 16   Ht 1.702 m (5' 7\")   Wt 60.9 kg (134 lb 3.2 oz)   SpO2 95%   BMI 21.02 kg/m²  Body mass index is 21.02 kg/m².    Gen: Alert and oriented, No apparent distress.  Neck: Neck is supple without lymphadenopathy.  No carotid bruits.  Lungs: Normal effort, CTA bilaterally, no wheezes, rhonchi, or rales  CV: Regular rate and rhythm. No murmurs, rubs, or gallops.  Ext: No clubbing, cyanosis, edema.    Labs: Dated: None    Assessment & Plan:     76 y.o. male with the following -     REM sleep behavior disorder  Chronic condition, stable.  Patient takes clonazepam 1 mg daily for his sleep disorder.  He was referred to sleep disorder clinic at his initial visit with me 4/11/2022, however has not made an appointment.  I had told patient I would refill the clonazepam until sleep disorder clinic could manage his care.  He has recently refilled his last " "prescription at [facility] which was from his prior provider in [place].  PDMP today does not reveal any medication refills in [place] today.  Patient reports that he will make an appointment with a sleep disorder clinic here.      New Referral was placed to [facility] and this was changed for him today.    Type 2 diabetes mellitus without complication, without long-term current use of insulin ([facility])  Chronic condition, stable.  Patient's most recent A1c was 6.9 on 4/15/2022.  Patient takes metformin 1000 mg BID as well as glimepiride 2 mg BID.  He prefers to have his A1c drawn every three months, and orders were given to him today.  He will continue his current regimen.       Dysuria  Acute condition, unstable. Patient states that he has noticed that his urination feels \"different.\"  He denies pain with urination, frequency or urgency.  He denies blood in his urine, dark or cloudy urine.   He is getting a Remicade infusion on Friday and he would like to ensure that he does not have a UTI.  POCT UA was negative in the office today, but will send for culture to be sure.  Patient will be advised of results prior to his infusion.      Orders Placed This Encounter   • URINE CULTURE(NEW)   • HEMOGLOBIN A1C   • MICROALBUMIN CREAT RATIO URINE   • Comp Metabolic Panel   • Lipid Profile   • CBC WITH DIFFERENTIAL   • Referral to Psychiatry   • POCT Urinalysis          Return in about 3 months (around 9/28/2022).    I have placed the below orders and discussed them with an approved delegating provider.  The MA is performing the below orders under the direction of Ingris Medrano MD.    Please note that this dictation was created using voice recognition software. I have made every reasonable attempt to correct obvious errors, but I expect that there are errors of grammar and possibly content that I did not discover before finalizing the note.  "

## 2022-06-29 NOTE — ASSESSMENT & PLAN NOTE
Chronic condition, stable.  Patient's most recent A1c was 6.9 on 4/15/2022.  Patient takes metformin 1000 mg BID as well as glimepiride 2 mg BID.  He prefers to have his A1c drawn every three months, and orders were given to him today.  He will continue his current regimen.

## 2022-06-30 LAB
BACTERIA UR CULT: NORMAL
SIGNIFICANT IND 70042: NORMAL
SITE SITE: NORMAL
SOURCE SOURCE: NORMAL

## 2022-06-30 NOTE — RESULT ENCOUNTER NOTE
Results released to [facility].    Shamar Friend,  Your urine culture is negative.  Isabela

## 2022-07-01 ENCOUNTER — OUTPATIENT INFUSION SERVICES (OUTPATIENT)
Dept: ONCOLOGY | Facility: MEDICAL CENTER | Age: 77
End: 2022-07-01
Attending: STUDENT IN AN ORGANIZED HEALTH CARE EDUCATION/TRAINING PROGRAM
Payer: MEDICARE

## 2022-07-01 VITALS
WEIGHT: 134.7 LBS | HEART RATE: 90 BPM | HEIGHT: 67 IN | TEMPERATURE: 97.6 F | BODY MASS INDEX: 21.14 KG/M2 | RESPIRATION RATE: 18 BRPM | SYSTOLIC BLOOD PRESSURE: 119 MMHG | OXYGEN SATURATION: 96 % | DIASTOLIC BLOOD PRESSURE: 63 MMHG

## 2022-07-01 DIAGNOSIS — L40.50 PSORIATIC ARTHRITIS (HCC): ICD-10-CM

## 2022-07-01 PROCEDURE — 96413 CHEMO IV INFUSION 1 HR: CPT

## 2022-07-01 PROCEDURE — 700111 HCHG RX REV CODE 636 W/ 250 OVERRIDE (IP): Mod: JG | Performed by: STUDENT IN AN ORGANIZED HEALTH CARE EDUCATION/TRAINING PROGRAM

## 2022-07-01 PROCEDURE — 700105 HCHG RX REV CODE 258: Performed by: STUDENT IN AN ORGANIZED HEALTH CARE EDUCATION/TRAINING PROGRAM

## 2022-07-01 RX ORDER — SODIUM CHLORIDE 9 MG/ML
INJECTION, SOLUTION INTRAVENOUS CONTINUOUS
Status: DISCONTINUED | OUTPATIENT
Start: 2022-07-01 | End: 2022-07-01 | Stop reason: HOSPADM

## 2022-07-01 RX ORDER — SODIUM CHLORIDE 9 MG/ML
INJECTION, SOLUTION INTRAVENOUS CONTINUOUS
Status: CANCELLED | OUTPATIENT
Start: 2022-08-26

## 2022-07-01 RX ADMIN — SODIUM CHLORIDE 600 MG: 9 INJECTION, SOLUTION INTRAVENOUS at 10:36

## 2022-07-01 RX ADMIN — SODIUM CHLORIDE: 9 INJECTION, SOLUTION INTRAVENOUS at 10:36

## 2022-07-01 ASSESSMENT — PAIN DESCRIPTION - PAIN TYPE: TYPE: CHRONIC PAIN

## 2022-07-01 ASSESSMENT — FIBROSIS 4 INDEX: FIB4 SCORE: 1.62

## 2022-07-01 NOTE — PROGRESS NOTES
Pt arrived ambulatory to [facility] for 8 week Infliximab infusion. POC discussed with pt and he agrees with plan. Pt with call light in reach for safety.     PIV established, brisk blood return noted. Pt medicated per MAR. Infliximab infused over 1 hour today. Pt tolerated treatment without s/s adverse reaction. PIV dc'd catheter tip intact, gauze and coban dressing applied. Pt discharged to self care, [facility]. Pt's next appt confirmed 8/26/2022.

## 2022-07-05 DIAGNOSIS — G47.52 REM SLEEP BEHAVIOR DISORDER: ICD-10-CM

## 2022-07-18 ENCOUNTER — OFFICE VISIT (OUTPATIENT)
Dept: MEDICAL GROUP | Facility: PHYSICIAN GROUP | Age: 77
End: 2022-07-18
Payer: MEDICARE

## 2022-07-18 VITALS
WEIGHT: 134 LBS | BODY MASS INDEX: 21.03 KG/M2 | TEMPERATURE: 98.5 F | HEIGHT: 67 IN | SYSTOLIC BLOOD PRESSURE: 124 MMHG | OXYGEN SATURATION: 96 % | DIASTOLIC BLOOD PRESSURE: 68 MMHG | HEART RATE: 103 BPM | RESPIRATION RATE: 18 BRPM

## 2022-07-18 DIAGNOSIS — R68.89 FLU-LIKE SYMPTOMS: ICD-10-CM

## 2022-07-18 LAB
EXTERNAL QUALITY CONTROL: NORMAL
FLUAV+FLUBV AG SPEC QL IA: NEGATIVE
INT CON NEG: NORMAL
INT CON NEG: NORMAL
INT CON POS: NORMAL
INT CON POS: NORMAL
S PYO AG THROAT QL: NEGATIVE
SARS-COV+SARS-COV-2 AG RESP QL IA.RAPID: NEGATIVE

## 2022-07-18 PROCEDURE — 99213 OFFICE O/P EST LOW 20 MIN: CPT | Mod: CS | Performed by: PHYSICIAN ASSISTANT

## 2022-07-18 PROCEDURE — 87426 SARSCOV CORONAVIRUS AG IA: CPT | Performed by: PHYSICIAN ASSISTANT

## 2022-07-18 PROCEDURE — 87804 INFLUENZA ASSAY W/OPTIC: CPT | Performed by: PHYSICIAN ASSISTANT

## 2022-07-18 PROCEDURE — 87880 STREP A ASSAY W/OPTIC: CPT | Performed by: PHYSICIAN ASSISTANT

## 2022-07-18 ASSESSMENT — FIBROSIS 4 INDEX: FIB4 SCORE: 1.62

## 2022-07-18 NOTE — PROGRESS NOTES
Chief Complaint   Patient presents with   • Sinus Problem     Pressure in nose and eyes last Tuesday 7/12   • Chills     Last night when going to bed    • Sore Throat     Last Saturday    • Cough     Last saturday         HPI: This is a 76 y.o. patient has 6 days of sore throat, cough and congestion. Yes runny nose. Yes ear fullness. No abnormal shortness of breath. No nausea vomiting or diarrhea. Mild subjective fever and chills. Yes sick exposures- grandsons have been around them and they were sick. No coughing up blood. No headache.  Patient has taken over-the-counter analgesics (Tylenol) and decongestant/antihistamines without relief.  Given his significant amount of sinus pressure between his eyes that is now moved down to his chest. Negative COVID test on day 3-4 of symptoms.       ROS:  No fever, cough, nausea, changes in bowel movements or skin rash.      I reviewed the patient's medications, allergies and medical history:  Current Outpatient Medications   Medication Sig Dispense Refill   • folic acid (FOLVITE) 1 MG Tab Take 1 Tablet by mouth every day. 90 Tablet 3   • glimepiride (AMARYL) 2 MG Tab Take 2 mg by mouth 2 (two) times a day.     • pravastatin (PRAVACHOL) 20 MG Tab Take 20 mg by mouth every evening.     • inFLIXimab (REMICADE) 100 MG Recon Soln Infuse 600 mg into a venous catheter every 8 weeks.     • clonazePAM (KLONOPIN) 1 MG Tab Take 1 mg by mouth every evening. REM sleep disorder     • therapeutic multivitamin-minerals (THERAGRAN-M) Tab Take 1 Tablet by mouth every day.     • omeprazole (PRILOSEC) 20 MG delayed-release capsule Take 20 mg by mouth every day.     • aspirin (ASA) 81 MG Chew Tab chewable tablet Chew 81 mg every day.     • Melatonin 10 MG Cap Take 20 mg by mouth every evening.     • Acetaminophen (TYLENOL ARTHRITIS PAIN PO) Take 1,250 mg by mouth every day.     • metformin (GLUCOPHAGE) 1000 MG tablet Take 1,000 mg by mouth 2 times a day with meals.     • sildenafil (REVATIO) 20 MG  "tablet Take 20 mg by mouth as needed. Three as needed     • methotrexate 2.5 MG Tab Take 6 Tablets by mouth every 7 days. (Patient not taking: Reported on 7/18/2022) 77 Tablet 3     No current facility-administered medications for this visit.     Patient has no known allergies.  Past Medical History:   Diagnosis Date   • Diabetes (HCC)    • Psoriatic arthritis (HCC)    • REM sleep behavior disorder         EXAM:  /68   Pulse (!) 103   Temp 36.9 °C (98.5 °F) (Temporal)   Resp 18   Ht 1.702 m (5' 7\")   Wt 60.8 kg (134 lb)   SpO2 96%   General: NAD, non-toxic appearance.  Eyes: PERRL, conjunctiva slightly injected, no photophobia or eye discharge.  Ears: Normal pinnae. TM's pearly gray with good landmarks bilaterally.  Nares: Patent with thin, clear mucus.  Sinuses: Non-tender over maxillary and frontal sinuses.  Throat: Erythematous injection, raspy voice, No exudates.   Neck: Supple, with shotty anterior cervical lymphadenopathy.  Lungs: Good air entry bilaterally, clear to auscultation. No wheeze, rhonchi or crackles. Normal respiratory effort.  Heart: Regular rate without murmur.  Skin: Warm and dry. No rash.     Results for orders placed or performed in visit on 07/18/22   POCT SARS-COV Antigen KRISTINA (Symptomatic Only)   Result Value Ref Range    Internal  Valid     SARS-COV ANTIGEN KRISTINA Negative Negative, Indeterminate, None Detected, Valid, Invalid, Pass   POCT Influenza A/B   Result Value Ref Range    Rapid Influenza A-B Negative     Internal Control Positive Valid     Internal Control Negative Valid    POCT Rapid Strep A   Result Value Ref Range    Rapid Strep Screen Negative     Internal Control Positive Valid     Internal Control Negative Valid          ASSESSMENT:   1. Flu-like symptoms          PLAN:  1. Discussed benign nature of viral upper respiratory infections.  POCT strep, COVID, flu all negative today  2. OTC anti-pyretics and decongestants as needed. Supportive care advised " including sinus rinses.  3. Follow-up in office or urgent care for worsening symptoms, difficulty breathing, lack of expected recovery, or should new symptoms or problems arise.

## 2022-07-19 ENCOUNTER — APPOINTMENT (OUTPATIENT)
Dept: RHEUMATOLOGY | Facility: MEDICAL CENTER | Age: 77
End: 2022-07-19
Payer: MEDICARE

## 2022-07-21 ENCOUNTER — TELEPHONE (OUTPATIENT)
Dept: MEDICAL GROUP | Facility: PHYSICIAN GROUP | Age: 77
End: 2022-07-21
Payer: MEDICARE

## 2022-07-21 DIAGNOSIS — H10.33 ACUTE BACTERIAL CONJUNCTIVITIS OF BOTH EYES: ICD-10-CM

## 2022-07-21 RX ORDER — POLYMYXIN B SULFATE AND TRIMETHOPRIM 1; 10000 MG/ML; [USP'U]/ML
1 SOLUTION OPHTHALMIC EVERY 4 HOURS
Qty: 10 ML | Refills: 0 | Status: SHIPPED
Start: 2022-07-21 | End: 2022-08-18

## 2022-07-23 ENCOUNTER — OFFICE VISIT (OUTPATIENT)
Dept: URGENT CARE | Facility: PHYSICIAN GROUP | Age: 77
End: 2022-07-23
Payer: MEDICARE

## 2022-07-23 VITALS
BODY MASS INDEX: 21.03 KG/M2 | OXYGEN SATURATION: 96 % | RESPIRATION RATE: 18 BRPM | HEART RATE: 104 BPM | TEMPERATURE: 98.8 F | DIASTOLIC BLOOD PRESSURE: 78 MMHG | SYSTOLIC BLOOD PRESSURE: 118 MMHG | HEIGHT: 67 IN | WEIGHT: 134 LBS

## 2022-07-23 DIAGNOSIS — B96.89 ACUTE BACTERIAL SINUSITIS: ICD-10-CM

## 2022-07-23 DIAGNOSIS — J01.90 ACUTE BACTERIAL SINUSITIS: ICD-10-CM

## 2022-07-23 PROCEDURE — 99213 OFFICE O/P EST LOW 20 MIN: CPT | Performed by: NURSE PRACTITIONER

## 2022-07-23 RX ORDER — AMOXICILLIN AND CLAVULANATE POTASSIUM 875; 125 MG/1; MG/1
1 TABLET, FILM COATED ORAL 2 TIMES DAILY
Qty: 20 TABLET | Refills: 0 | Status: SHIPPED | OUTPATIENT
Start: 2022-07-23 | End: 2022-08-02

## 2022-07-23 RX ORDER — FLUTICASONE PROPIONATE 50 MCG
1 SPRAY, SUSPENSION (ML) NASAL DAILY
Qty: 16 G | Refills: 0 | Status: SHIPPED | OUTPATIENT
Start: 2022-07-23 | End: 2022-08-02

## 2022-07-23 ASSESSMENT — ENCOUNTER SYMPTOMS
SPUTUM PRODUCTION: 1
CHILLS: 1
VOMITING: 0
SHORTNESS OF BREATH: 0
DIARRHEA: 0
COUGH: 1
WHEEZING: 0
SORE THROAT: 1
HEADACHES: 1
NAUSEA: 0
SINUS PRESSURE: 1
ABDOMINAL PAIN: 0
FEVER: 0
HOARSE VOICE: 1
HEMOPTYSIS: 0
SINUS PAIN: 1

## 2022-07-23 ASSESSMENT — FIBROSIS 4 INDEX: FIB4 SCORE: 1.62

## 2022-07-23 NOTE — PROGRESS NOTES
Subjective:     Phuc Mcdowell is a 76 y.o. male who presents for Sinus Problem (Sinus infection, nasal congestion, very thick yellow brown red scabby mucus. Eye pressure. )      Sinus Problem  This is a new problem. The current episode started in the past 7 days (Phuc is a pleasant 76 year old male who presents to  today with complaints of a sinus infection that started over 7 days ago. His symptoms have progressively worsened. He states a few days ago he was started on treatment for bacterial conjunctivitis). The problem has been gradually worsening since onset. There has been no fever. Associated symptoms include chills, congestion, coughing, ear pain, headaches, a hoarse voice, sinus pressure and a sore throat. Pertinent negatives include no shortness of breath. Past treatments include saline sprays. The treatment provided no relief.   He did test negative from FLU, COVID and strep at previous appointment 5 days ago. No known ill contacts. He is immunocompromised with psoriatic arthritis and takes Remicade and Methotrexate.        Review of Systems   Constitutional: Positive for chills and malaise/fatigue. Negative for fever.   HENT: Positive for congestion, ear pain, hoarse voice, sinus pressure, sinus pain and sore throat.    Respiratory: Positive for cough and sputum production. Negative for hemoptysis, shortness of breath and wheezing.    Gastrointestinal: Negative for abdominal pain, diarrhea, nausea and vomiting.   Neurological: Positive for headaches.       PMH:   Past Medical History:   Diagnosis Date   • Diabetes (HCC)    • Psoriatic arthritis (HCC)    • REM sleep behavior disorder      ALLERGIES: No Known Allergies  SURGHX:   Past Surgical History:   Procedure Laterality Date   • CATARACT EXTRACTION WITH IOL Bilateral 2016     SOCHX:   Social History     Socioeconomic History   • Marital status:    Tobacco Use   • Smoking status: Former Smoker   • Smokeless tobacco: Never Used   • Tobacco  "comment: quit in    Vaping Use   • Vaping Use: Never used   Substance and Sexual Activity   • Alcohol use: Yes     Comment: occasional glass of wine   • Drug use: Never   • Sexual activity: Yes     FH:   Family History   Problem Relation Age of Onset   • Cancer Mother         lung and brain -  at 88   • Heart Disease Father         passed at 62   • Hypertension Sister    • Hypertension Brother          Objective:   /78   Pulse (!) 104   Temp 37.1 °C (98.8 °F) (Tympanic)   Resp 18   Ht 1.702 m (5' 7\")   Wt 60.8 kg (134 lb)   SpO2 96%   BMI 20.99 kg/m²     Physical Exam  Vitals and nursing note reviewed.   Constitutional:       General: He is not in acute distress.     Appearance: Normal appearance. He is ill-appearing.   HENT:      Head: Normocephalic and atraumatic.      Right Ear: Tympanic membrane, ear canal and external ear normal. There is no impacted cerumen.      Left Ear: Tympanic membrane, ear canal and external ear normal. There is no impacted cerumen.      Nose: Congestion present. No rhinorrhea.      Right Turbinates: Enlarged and swollen.      Left Turbinates: Enlarged and swollen.      Right Sinus: Maxillary sinus tenderness and frontal sinus tenderness present.      Left Sinus: Maxillary sinus tenderness and frontal sinus tenderness present.      Mouth/Throat:      Mouth: Mucous membranes are moist.      Pharynx: Posterior oropharyngeal erythema present. No oropharyngeal exudate.   Eyes:      Extraocular Movements: Extraocular movements intact.      Pupils: Pupils are equal, round, and reactive to light.   Cardiovascular:      Rate and Rhythm: Normal rate and regular rhythm.      Pulses: Normal pulses.      Heart sounds: Normal heart sounds.   Pulmonary:      Effort: Pulmonary effort is normal. No respiratory distress.      Breath sounds: Normal breath sounds. No stridor. No wheezing, rhonchi or rales.   Chest:      Chest wall: No tenderness.   Abdominal:      General: Abdomen is " flat. Bowel sounds are normal.      Palpations: Abdomen is soft.      Tenderness: There is no abdominal tenderness. There is no right CVA tenderness or left CVA tenderness.   Musculoskeletal:         General: Normal range of motion.      Cervical back: Normal range of motion and neck supple.   Lymphadenopathy:      Cervical: No cervical adenopathy.   Skin:     General: Skin is warm and dry.      Capillary Refill: Capillary refill takes less than 2 seconds.   Neurological:      General: No focal deficit present.      Mental Status: He is alert and oriented to person, place, and time. Mental status is at baseline.   Psychiatric:         Mood and Affect: Mood normal.         Behavior: Behavior normal.         Thought Content: Thought content normal.         Judgment: Judgment normal.         Assessment/Plan:   Assessment    1. Acute bacterial sinusitis  amoxicillin-clavulanate (AUGMENTIN) 875-125 MG Tab    fluticasone (FLONASE) 50 MCG/ACT nasal spray     Prescriptions called into pharmacy. AVS printed and reviewed with pt. Red flags identified of when to seek care back in UC or ER including SOB, increased fever and worsening symptoms. Symptomatic treatment such as OTC Ibuprofen/ Acetaminophen, increased fluids, and humidifier encouraged Pt in agreement with care plan today.    AVS handout given and reviewed with patient. Pt educated on red flags and when to seek treatment back in ER or UC.

## 2022-07-26 ENCOUNTER — OFFICE VISIT (OUTPATIENT)
Dept: RHEUMATOLOGY | Facility: MEDICAL CENTER | Age: 77
End: 2022-07-26
Payer: MEDICARE

## 2022-07-26 VITALS
TEMPERATURE: 97 F | RESPIRATION RATE: 16 BRPM | SYSTOLIC BLOOD PRESSURE: 94 MMHG | DIASTOLIC BLOOD PRESSURE: 62 MMHG | HEART RATE: 94 BPM | WEIGHT: 132.8 LBS | OXYGEN SATURATION: 96 % | HEIGHT: 67 IN | BODY MASS INDEX: 20.84 KG/M2

## 2022-07-26 DIAGNOSIS — G89.29 CHRONIC PAIN OF LEFT ELBOW: ICD-10-CM

## 2022-07-26 DIAGNOSIS — M25.522 CHRONIC PAIN OF LEFT ELBOW: ICD-10-CM

## 2022-07-26 DIAGNOSIS — Z79.899 HIGH RISK MEDICATION USE: ICD-10-CM

## 2022-07-26 DIAGNOSIS — L40.50 PSORIATIC ARTHRITIS (HCC): ICD-10-CM

## 2022-07-26 PROCEDURE — 99214 OFFICE O/P EST MOD 30 MIN: CPT | Performed by: STUDENT IN AN ORGANIZED HEALTH CARE EDUCATION/TRAINING PROGRAM

## 2022-07-26 ASSESSMENT — FIBROSIS 4 INDEX: FIB4 SCORE: 1.62

## 2022-07-26 ASSESSMENT — PAIN SCALES - GENERAL: PAINLEVEL: 2=MINIMAL-SLIGHT

## 2022-07-26 NOTE — PROGRESS NOTES
[facility] - ARTHRITIS C[facility]  [address] [place] [address], [address], [place] [address]  Phone: [phone] / Fax: [phone]    RHEUMATOLOGY FOLLOW-UP VISIT NOTE      DATE OF SERVICE: 07/26/2022    PRIMARY CARE PROVIDER:   JONE Porras  [address] [place] [address]    LAST CLINIC VISIT:  6/22/2022      SUBJECTIVE:     CHIEF COMPLAINT:   Chief Complaint   Patient presents with   • Follow-Up     Psoriatic Arthritis        RHEUMATOLOGIC HISTORY:  Phuc Mcdowell is a 76 y.o. male with pertinent history notable for psoriatic arthritis diagnosed in 2000 preceded by psoriasis diagnosed many years prior. Previously under the care of a rheumatologist in [facility] prior to relocating to [place], he initially presented on 4/19/2022 to establish care. Reported no new skin plaques and only minimal joint pain in hands (mostly DIP joints) toward the end of infliximab infusion cycle. This is sometimes associated with only 30 minutes of morning stiffness that improves with activity. Noted some weight loss due to changes he made in his diet and physical activity.    Pertinent treatment history: Cyclosporine 100mg daily (years-4/2022), methotrexate 15mg weekly (increased from 7.5mg in 4/2022-present) and infliximab 600mg every 8 weeks (years-present) with good disease control.  Pertinent labs as of 4/2022: Normal ESR 21 and CRP 0.2 in 2/2022; elevated Cr 1.41 with GFR 52 with otherwise unremarkable CMP and CBC in 4/2022.  Pertinent imaging as of 11/2021: X-rays showed pelvis with bilateral mild SI joint arthritis with partial osseous fusion; shoulders with mild osteoarthritis of bilateral AC joints and left GH joint.    INTERVAL HISTORY:  Reports chronic left elbow pain worse with activity which has persisted and is increasingly bothersome.  Reports recent sinus infection which he is still recovering from on treatment with antibiotics.  Otherwise doing well with no worsening of his  psoriatic arthritis or psoriasis.    REVIEW OF SYSTEMS:  Except as noted in the history above, a complete review of systems with emphasis on autoimmune inflammatory conditions was otherwise negative for any significant symptoms.      ACTIVE PROBLEM LIST:  Patient Active Problem List   Diagnosis   • Cerumen impaction   • REM sleep behavior disorder   • Psoriatic arthritis (HCC)   • Ingrown toenail of right foot   • Type 2 diabetes mellitus without complication, without long-term current use of insulin (HCC)   • Mixed hyperlipidemia   • High risk medication use   • Plaque psoriasis   • Dysuria   • Chronic pain of left elbow   No problem-specific Assessment & Plan notes found for this encounter.      PAST MEDICAL HISTORY:  Past Medical History:   Diagnosis Date   • Diabetes (HCC)    • Psoriatic arthritis (HCC)    • REM sleep behavior disorder        PAST SURGICAL HISTORY:  Past Surgical History:   Procedure Laterality Date   • CATARACT EXTRACTION WITH IOL Bilateral 2016       MEDICATIONS:  Current Outpatient Medications   Medication Sig Dispense Refill   • amoxicillin-clavulanate (AUGMENTIN) 875-125 MG Tab Take 1 Tablet by mouth 2 times a day for 10 days. 20 Tablet 0   • fluticasone (FLONASE) 50 MCG/ACT nasal spray Administer 1 Spray into affected nostril(S) every day for 10 days. 16 g 0   • polymixin-trimethoprim (POLYTRIM) 85206-9.1 UNIT/ML-% Solution Administer 1 Drop into both eyes every 4 hours. 10 mL 0   • methotrexate 2.5 MG Tab Take 6 Tablets by mouth every 7 days. 77 Tablet 3   • folic acid (FOLVITE) 1 MG Tab Take 1 Tablet by mouth every day. 90 Tablet 3   • glimepiride (AMARYL) 2 MG Tab Take 2 mg by mouth 2 (two) times a day.     • pravastatin (PRAVACHOL) 20 MG Tab Take 20 mg by mouth every evening.     • inFLIXimab (REMICADE) 100 MG Recon Soln Infuse 600 mg into a venous catheter every 8 weeks.     • clonazePAM (KLONOPIN) 1 MG Tab Take 1 mg by mouth every evening. REM sleep disorder     • therapeutic  "multivitamin-minerals (THERAGRAN-M) Tab Take 1 Tablet by mouth every day.     • omeprazole (PRILOSEC) 20 MG delayed-release capsule Take 20 mg by mouth every day.     • aspirin (ASA) 81 MG Chew Tab chewable tablet Chew 81 mg every day.     • Melatonin 10 MG Cap Take 20 mg by mouth every evening.     • Acetaminophen (TYLENOL ARTHRITIS PAIN PO) Take 1,250 mg by mouth every day.     • metformin (GLUCOPHAGE) 1000 MG tablet Take 1,000 mg by mouth 2 times a day with meals.     • sildenafil (REVATIO) 20 MG tablet Take 20 mg by mouth as needed. Three as needed       No current facility-administered medications for this visit.       ALLERGIES:   No Known Allergies    IMMUNIZATIONS:  Immunization History   Administered Date(s) Administered   • MODERNA SARS-COV-2 VACCINE (12+) 2021, 2021, 2021       SOCIAL HISTORY:   Social History     Tobacco Use   • Smoking status: Former Smoker   • Smokeless tobacco: Never Used   • Tobacco comment: quit in    Vaping Use   • Vaping Use: Never used   Substance Use Topics   • Alcohol use: Yes     Comment: occasional glass of wine   • Drug use: Never       FAMILY HISTORY:  Family History   Problem Relation Age of Onset   • Cancer Mother         lung and brain -  at 88   • Heart Disease Father         passed at 62   • Hypertension Sister    • Hypertension Brother         OBJECTIVE:     Vital Signs: BP (!) 94/62 (BP Location: Left arm, Patient Position: Sitting, BP Cuff Size: Adult)   Pulse 94   Temp 36.1 °C (97 °F) (Temporal)   Resp 16   Ht 1.702 m (5' 7\")   Wt 60.2 kg (132 lb 12.8 oz)   SpO2 96% Body mass index is 20.8 kg/m².    General: Appears well and comfortable  Eyes: No scleral or conjunctival lesions  ENT: No apparent oral or nasal lesions  Head/Neck: No apparent scalp or neck lesions  Cardiovascular: Regular rate and rhythm  Respiratory: Breathing quiet and unlabored  Gastrointestinal: No organomegaly or abdominal masses  Integumentary: No significant " cutaneous lesions or discolorations  Musculoskeletal: Mild tenderness on left elbow medial and lateral joint lines; no periarticular soft tissue swelling, warmth, erythema, or overt signs of synovitis; no significant restriction in range of motion of joints examined  Neurologic: No focal sensory or motor deficits  Psychiatric: Mood and affect appropriate      LABORATORY RESULTS REVIEWED AND INTERPRETED BY ME:  Lab Results   Component Value Date/Time    HEPBSAG Non-Reactive 05/06/2022 02:18 PM    HEPBCORTOT NonReactive 05/06/2022 02:18 PM     Lab Results   Component Value Date/Time    ASTSGOT 24 04/15/2022 08:15 AM    ALTSGPT 20 04/15/2022 08:15 AM    ALKPHOSPHAT 52 04/15/2022 08:15 AM    TBILIRUBIN 0.6 04/15/2022 08:15 AM    TOTPROTEIN 7.5 04/15/2022 08:15 AM    ALBUMIN 4.4 04/15/2022 08:15 AM     Lab Results   Component Value Date/Time    SODIUM 141 04/15/2022 08:15 AM    POTASSIUM 4.9 04/15/2022 08:15 AM    CHLORIDE 105 04/15/2022 08:15 AM    CO2 21 04/15/2022 08:15 AM    GLUCOSE 125 (H) 04/15/2022 08:15 AM    BUN 28 (H) 04/15/2022 08:15 AM    CREATININE 1.41 (H) 04/15/2022 08:15 AM    CALCIUM 10.2 04/15/2022 08:15 AM     Lab Results   Component Value Date/Time    WBC 5.9 04/15/2022 08:15 AM    RBC 4.42 (L) 04/15/2022 08:15 AM    HEMOGLOBIN 13.6 (L) 04/15/2022 08:15 AM    HEMATOCRIT 42.4 04/15/2022 08:15 AM    MCV 95.9 04/15/2022 08:15 AM    MCH 30.8 04/15/2022 08:15 AM    MCHC 32.1 (L) 04/15/2022 08:15 AM    RDW 49.3 04/15/2022 08:15 AM    PLATELETCT 245 04/15/2022 08:15 AM    MPV 9.9 04/15/2022 08:15 AM    NEUTS 2.40 04/15/2022 08:15 AM    LYMPHOCYTES 43.70 (H) 04/15/2022 08:15 AM    MONOCYTES 12.50 04/15/2022 08:15 AM    EOSINOPHILS 1.90 04/15/2022 08:15 AM    BASOPHILS 0.70 04/15/2022 08:15 AM     Lab Results   Component Value Date/Time    CHOLSTRLTOT 140 04/15/2022 08:15 AM    LDL 72 04/15/2022 08:15 AM    HDL 50 04/15/2022 08:15 AM    TRIGLYCERIDE 91 04/15/2022 08:15 AM    HBA1C 6.9 (H) 04/15/2022 08:15 AM        RADIOLOGY RESULTS REVIEWED AND INTERPRETED BY ME:  Results for orders placed in visit on 04/12/22    DX-SHOULDER 2+    Results for orders placed in visit on 04/12/22    DX-PELVIS-1 OR 2 VIEWS      All relevant laboratory and imaging results reported on this note were reviewed and interpreted by me.      ASSESSMENT AND PLAN:     Phuc Mcdowell is a 76 y.o. male with history as noted above whose presentation merits the following diagnostic and clinical status impressions and recommendations:    1. Psoriatic arthritis (HCC)  Clinical picture suggests relatively low disease activity reasonably controlled on the current regimen, so no need for escalation of treatment at this time. That said, given the possibility of discordance between immunologic activity and clinical disease manifestations, need to routinely check laboratory markers of disease activity for complete assessment.  - Sed Rate  - CRP QUANTITIVE (NON-CARDIAC)  - Continue methotrexate 15 mg oral weekly with folic acid 1 mg daily  - Continue infliximab 600 mg IV infusion every 8 weeks     2. Chronic pain of left elbow  Likely secondary to osteoarthritis but reasonable to confirm with radiograph before further recommendations.  - DX-ELBOW-LIMITED 2- LEFT    3. High risk medication use  No current history, physical findings, or laboratory evidence to suggest significant adverse drug effects or opportunistic infections.  - Comp Metabolic Panel  - CBC WITH DIFFERENTIAL  - Need to confirm/address age-appropriate vaccines  - Okay to get COVID-19 vaccine booster but need to time it for 4-6 weeks after infliximab infusion and 1 week after methotrexate followed by holding methotrexate for 1 week before resuming usual dosing      FOLLOW-UP: Return in about 3 months (around 10/26/2022) for Short.           Thank you for giving me the opportunity to participate in the care of Phuc Mcdowell.    Piero Lewis MD, MS  Rheumatologist, [facility] -  Arthritis Center  , Department of Internal Medicine  [facility]

## 2022-07-26 NOTE — PATIENT INSTRUCTIONS
AFTER VISIT INSTRUCTIONS    Below are important information to help you navigate your healthcare needs and help us serve you safely and effectively:  If laboratory tests and/or imaging studies were ordered, remember to go get them done.  If new prescriptions or refills were sent to the pharmacy, remember to go pick them up.  Take your medications exactly as prescribed unless instructed otherwise.  If there are significant findings on your lab tests and imaging studies that warrant further action, I will notify you with explanations via Konjekthart or phone call, otherwise you can view them on Quettrat and let me know if you have any questions.  Sign up for Oncovision if you have not already done so, in order to have access to the results of your lab tests and imaging studies, and to be able to send and receive messages from me.  Note that Oncovision messages are typically read during office hours only and may take 1-7 days before a response depending on the urgency of the situation and how busy my schedule is.  In general, Oncovision messaging is for non-urgent matters that do not require immediate attention, so for urgent matters that cannot wait, you are encouraged to go to an urgent care.

## 2022-07-28 ENCOUNTER — HOSPITAL ENCOUNTER (OUTPATIENT)
Dept: RADIOLOGY | Facility: MEDICAL CENTER | Age: 77
End: 2022-07-28
Attending: STUDENT IN AN ORGANIZED HEALTH CARE EDUCATION/TRAINING PROGRAM
Payer: MEDICARE

## 2022-07-28 ENCOUNTER — HOSPITAL ENCOUNTER (OUTPATIENT)
Dept: LAB | Facility: MEDICAL CENTER | Age: 77
End: 2022-07-28
Attending: STUDENT IN AN ORGANIZED HEALTH CARE EDUCATION/TRAINING PROGRAM
Payer: MEDICARE

## 2022-07-28 DIAGNOSIS — E11.9 TYPE 2 DIABETES MELLITUS WITHOUT COMPLICATION, WITHOUT LONG-TERM CURRENT USE OF INSULIN (HCC): ICD-10-CM

## 2022-07-28 PROCEDURE — 73070 X-RAY EXAM OF ELBOW: CPT | Mod: LT

## 2022-07-29 ENCOUNTER — HOSPITAL ENCOUNTER (OUTPATIENT)
Dept: LAB | Facility: MEDICAL CENTER | Age: 77
End: 2022-07-29
Attending: STUDENT IN AN ORGANIZED HEALTH CARE EDUCATION/TRAINING PROGRAM
Payer: MEDICARE

## 2022-07-29 LAB
ALBUMIN SERPL BCP-MCNC: 4.1 G/DL (ref 3.2–4.9)
ALBUMIN/GLOB SERPL: 1.2 G/DL
ALP SERPL-CCNC: 74 U/L (ref 30–99)
ALT SERPL-CCNC: 18 U/L (ref 2–50)
ANION GAP SERPL CALC-SCNC: 11 MMOL/L (ref 7–16)
AST SERPL-CCNC: 25 U/L (ref 12–45)
BASOPHILS # BLD AUTO: 0.7 % (ref 0–1.8)
BASOPHILS # BLD: 0.06 K/UL (ref 0–0.12)
BILIRUB SERPL-MCNC: 0.2 MG/DL (ref 0.1–1.5)
BUN SERPL-MCNC: 16 MG/DL (ref 8–22)
CALCIUM SERPL-MCNC: 9.7 MG/DL (ref 8.5–10.5)
CHLORIDE SERPL-SCNC: 102 MMOL/L (ref 96–112)
CO2 SERPL-SCNC: 23 MMOL/L (ref 20–33)
CREAT SERPL-MCNC: 1.11 MG/DL (ref 0.5–1.4)
CRP SERPL HS-MCNC: 0.56 MG/DL (ref 0–0.75)
EOSINOPHIL # BLD AUTO: 0.2 K/UL (ref 0–0.51)
EOSINOPHIL NFR BLD: 2.4 % (ref 0–6.9)
ERYTHROCYTE [DISTWIDTH] IN BLOOD BY AUTOMATED COUNT: 46.1 FL (ref 35.9–50)
ERYTHROCYTE [SEDIMENTATION RATE] IN BLOOD BY WESTERGREN METHOD: 38 MM/HOUR (ref 0–20)
GFR SERPLBLD CREATININE-BSD FMLA CKD-EPI: 69 ML/MIN/1.73 M 2
GLOBULIN SER CALC-MCNC: 3.5 G/DL (ref 1.9–3.5)
GLUCOSE SERPL-MCNC: 144 MG/DL (ref 65–99)
HCT VFR BLD AUTO: 40.9 % (ref 42–52)
HGB BLD-MCNC: 13.7 G/DL (ref 14–18)
IMM GRANULOCYTES # BLD AUTO: 0.06 K/UL (ref 0–0.11)
IMM GRANULOCYTES NFR BLD AUTO: 0.7 % (ref 0–0.9)
LYMPHOCYTES # BLD AUTO: 2.9 K/UL (ref 1–4.8)
LYMPHOCYTES NFR BLD: 34.3 % (ref 22–41)
MCH RBC QN AUTO: 32.1 PG (ref 27–33)
MCHC RBC AUTO-ENTMCNC: 33.5 G/DL (ref 33.7–35.3)
MCV RBC AUTO: 95.8 FL (ref 81.4–97.8)
MONOCYTES # BLD AUTO: 0.82 K/UL (ref 0–0.85)
MONOCYTES NFR BLD AUTO: 9.7 % (ref 0–13.4)
NEUTROPHILS # BLD AUTO: 4.42 K/UL (ref 1.82–7.42)
NEUTROPHILS NFR BLD: 52.2 % (ref 44–72)
NRBC # BLD AUTO: 0 K/UL
NRBC BLD-RTO: 0 /100 WBC
PLATELET # BLD AUTO: 322 K/UL (ref 164–446)
PMV BLD AUTO: 8.8 FL (ref 9–12.9)
POTASSIUM SERPL-SCNC: 4.7 MMOL/L (ref 3.6–5.5)
PROT SERPL-MCNC: 7.6 G/DL (ref 6–8.2)
RBC # BLD AUTO: 4.27 M/UL (ref 4.7–6.1)
SODIUM SERPL-SCNC: 136 MMOL/L (ref 135–145)
WBC # BLD AUTO: 8.5 K/UL (ref 4.8–10.8)

## 2022-07-29 PROCEDURE — 85652 RBC SED RATE AUTOMATED: CPT

## 2022-07-29 PROCEDURE — 86140 C-REACTIVE PROTEIN: CPT

## 2022-07-29 PROCEDURE — 36415 COLL VENOUS BLD VENIPUNCTURE: CPT

## 2022-07-29 PROCEDURE — 80053 COMPREHEN METABOLIC PANEL: CPT

## 2022-07-29 PROCEDURE — 85025 COMPLETE CBC W/AUTO DIFF WBC: CPT

## 2022-08-18 ENCOUNTER — OFFICE VISIT (OUTPATIENT)
Dept: MEDICAL GROUP | Facility: PHYSICIAN GROUP | Age: 77
End: 2022-08-18
Payer: MEDICARE

## 2022-08-18 VITALS
RESPIRATION RATE: 20 BRPM | SYSTOLIC BLOOD PRESSURE: 110 MMHG | BODY MASS INDEX: 20.9 KG/M2 | DIASTOLIC BLOOD PRESSURE: 60 MMHG | WEIGHT: 133.13 LBS | HEART RATE: 100 BPM | OXYGEN SATURATION: 98 % | TEMPERATURE: 97.7 F | HEIGHT: 67 IN

## 2022-08-18 DIAGNOSIS — G47.52 REM SLEEP BEHAVIOR DISORDER: Chronic | ICD-10-CM

## 2022-08-18 PROCEDURE — 99214 OFFICE O/P EST MOD 30 MIN: CPT | Performed by: NURSE PRACTITIONER

## 2022-08-18 RX ORDER — CLONAZEPAM 1 MG/1
1 TABLET ORAL NIGHTLY
Qty: 30 TABLET | Refills: 0 | Status: SHIPPED
Start: 2022-08-18 | End: 2022-08-18

## 2022-08-18 RX ORDER — CLONAZEPAM 1 MG/1
1-1.5 TABLET ORAL NIGHTLY PRN
Qty: 45 TABLET | Refills: 0 | Status: SHIPPED | OUTPATIENT
Start: 2022-08-18 | End: 2022-09-17

## 2022-08-18 ASSESSMENT — FIBROSIS 4 INDEX: FIB4 SCORE: 1.35

## 2022-08-18 NOTE — ASSESSMENT & PLAN NOTE
Chronic and ongoing. He does take Clonazepam 1 mg 1 to 1.5 tablets nightly for his sleep disorder. He did end up going to a sleep disorder clinic and he was told that he would need to do a sleep study before the doctor would prescribe any medication to help him to sleep. He is here today to refill his prescription as the sleep doctor will not refill the medication until he does another sleep study. He is requesting a refill at this time.

## 2022-08-19 NOTE — PROGRESS NOTES
Subjective  Chief Complaint  Medication Refill    History of Present Illness  Phuc Mcdowell is a 76 y.o. male. This established patient is here today to refill his medication.    His PCP is ALMA Jarquin.    REM sleep behavior disorder  Chronic and ongoing. He does take Clonazepam 1 mg 1 to 1.5 tablets nightly for his sleep disorder. He did end up going to a sleep disorder clinic and he was told that he would need to do a sleep study before the doctor would prescribe any medication to help him to sleep. He is here today to refill his prescription as the sleep doctor will not refill the medication until he does another sleep study. He is requesting a refill at this time.    Past Medical History    Allergies: Patient has no known allergies.  Past Medical History:   Diagnosis Date    Diabetes (HCC)     Psoriatic arthritis (HCC)     REM sleep behavior disorder      Past Surgical History:   Procedure Laterality Date    CATARACT EXTRACTION WITH IOL Bilateral 2016     Current Outpatient Medications Ordered in Epic   Medication Sig Dispense Refill    clonazePAM (KLONOPIN) 1 MG Tab Take 1-1.5 Tablets by mouth at bedtime as needed (REM sleep behavior disorder) for up to 30 days. 45 Tablet 0    methotrexate 2.5 MG Tab Take 6 Tablets by mouth every 7 days. 77 Tablet 3    folic acid (FOLVITE) 1 MG Tab Take 1 Tablet by mouth every day. 90 Tablet 3    glimepiride (AMARYL) 2 MG Tab Take 2 mg by mouth 2 (two) times a day.      pravastatin (PRAVACHOL) 20 MG Tab Take 20 mg by mouth every evening.      inFLIXimab (REMICADE) 100 MG Recon Soln Infuse 600 mg into a venous catheter every 8 weeks.      therapeutic multivitamin-minerals (THERAGRAN-M) Tab Take 1 Tablet by mouth every day.      omeprazole (PRILOSEC) 20 MG delayed-release capsule Take 20 mg by mouth every day.      aspirin (ASA) 81 MG Chew Tab chewable tablet Chew 81 mg every day.      Melatonin 10 MG Cap Take 20 mg by mouth every evening.      Acetaminophen (TYLENOL  "ARTHRITIS PAIN PO) Take 1,250 mg by mouth every day.      metformin (GLUCOPHAGE) 1000 MG tablet Take 1,000 mg by mouth 2 times a day with meals.      sildenafil (REVATIO) 20 MG tablet Take 20 mg by mouth as needed. Three as needed       No current Epic-ordered facility-administered medications on file.     Family History:    Family History   Problem Relation Age of Onset    Cancer Mother         lung and brain -  at 88    Heart Disease Father         passed at 62    Hypertension Sister     Hypertension Brother       Personal/Social History:    Social History     Tobacco Use    Smoking status: Former    Smokeless tobacco: Never    Tobacco comments:     quit in    Vaping Use    Vaping Use: Never used   Substance Use Topics    Alcohol use: Yes     Comment: occasional glass of wine    Drug use: Never     Social History     Social History Narrative    Not on file      Review of Systems:   General: Negative for fever/chills and unexpected weight change.   Respiratory:  Negative for cough and dyspnea.    Cardiovascular:  Negative for chest pain and palpitations.  Musculoskeletal:  Negative for myalgias.   Skin:  Negative for rash.   Neurological:  Negative for numbness/tingling and headaches.     Objective  Physical Exam:   /60 (BP Location: Left arm, Patient Position: Sitting, BP Cuff Size: Adult)   Pulse 100   Temp 36.5 °C (97.7 °F) (Temporal)   Resp 20   Ht 1.702 m (5' 7\")   Wt 60.4 kg (133 lb 2 oz)   SpO2 98%  Body mass index is 20.85 kg/m².  General:  Alert and oriented.  Well appearing.  NAD  Neck: Supple without JVD. No lymphadenopathy.  Pulmonary:  Normal effort.  Clear to ausculation without rales, ronchi, or wheezing.  Cardiovascular:  Regular rate and rhythm without murmur, rubs or gallop.   Skin:  Warm and dry.  No obvious lesions.  Musculoskeletal:  No extremity cyanosis, clubbing, or edema.      Assessment/Plan  1. REM sleep behavior disorder  Chronic and ongoing.  Discussed with him that " I can refill his prescription for one month and then he will have to follow up with his PCP who is out of the office for any further refills, he verbalized understanding.  Continue to take Clonazepam 1 mg 1-1.5 tablet as needed nightly for sleep.  He would like to speak to a sleep medicine doctor about his condition and see what other treatment options there may be, referral placed at this time.  - Referral to Pulmonary and Sleep Medicine  - clonazePAM (KLONOPIN) 1 MG Tab; Take 1-1.5 Tablets by mouth at bedtime as needed (REM sleep behavior disorder) for up to 30 days.  Dispense: 45 Tablet; Refill: 0    Obtained and reviewed patient utilization report from [facility] pharmacy database on 8/18/22 at 1653 prior to writing prescription for controlled substance II, III or IV per [place] bill . Based on assessment of the report, the prescription is medically necessary.          Return in about 1 month (around 9/18/2022) for Medication F/U.    Please note that this dictation was created using voice recognition software. I have made every reasonable attempt to correct obvious errors, but I expect that there are errors of grammar and possibly content that I did not discover before finalizing the note.    ALMA Kuo  [facility]

## 2022-08-22 ENCOUNTER — TELEPHONE (OUTPATIENT)
Dept: SLEEP MEDICINE | Facility: MEDICAL CENTER | Age: 77
End: 2022-08-22

## 2022-08-22 NOTE — TELEPHONE ENCOUNTER
PT CALLED IN REQUESTING A CALL BACK FROM THE SLEEP CLINIC. HE WANTS TO KNOW IF [facility] SEE REM SLEEP DISORDER PTS.

## 2022-08-30 ENCOUNTER — OUTPATIENT INFUSION SERVICES (OUTPATIENT)
Dept: ONCOLOGY | Facility: MEDICAL CENTER | Age: 77
End: 2022-08-30
Attending: STUDENT IN AN ORGANIZED HEALTH CARE EDUCATION/TRAINING PROGRAM
Payer: MEDICARE

## 2022-08-30 VITALS
WEIGHT: 132.28 LBS | HEIGHT: 66 IN | HEART RATE: 88 BPM | TEMPERATURE: 97 F | OXYGEN SATURATION: 97 % | BODY MASS INDEX: 21.26 KG/M2 | SYSTOLIC BLOOD PRESSURE: 120 MMHG | RESPIRATION RATE: 18 BRPM | DIASTOLIC BLOOD PRESSURE: 57 MMHG

## 2022-08-30 DIAGNOSIS — L40.50 PSORIATIC ARTHRITIS (HCC): ICD-10-CM

## 2022-08-30 PROCEDURE — 96365 THER/PROPH/DIAG IV INF INIT: CPT

## 2022-08-30 PROCEDURE — 96413 CHEMO IV INFUSION 1 HR: CPT

## 2022-08-30 PROCEDURE — 700105 HCHG RX REV CODE 258: Performed by: STUDENT IN AN ORGANIZED HEALTH CARE EDUCATION/TRAINING PROGRAM

## 2022-08-30 PROCEDURE — 700111 HCHG RX REV CODE 636 W/ 250 OVERRIDE (IP): Mod: JG | Performed by: STUDENT IN AN ORGANIZED HEALTH CARE EDUCATION/TRAINING PROGRAM

## 2022-08-30 RX ORDER — SODIUM CHLORIDE 9 MG/ML
INJECTION, SOLUTION INTRAVENOUS CONTINUOUS
Status: CANCELLED | OUTPATIENT
Start: 2022-10-21

## 2022-08-30 RX ADMIN — SODIUM CHLORIDE 600 MG: 9 INJECTION, SOLUTION INTRAVENOUS at 16:31

## 2022-08-30 ASSESSMENT — PAIN DESCRIPTION - PAIN TYPE: TYPE: CHRONIC PAIN

## 2022-08-30 ASSESSMENT — FIBROSIS 4 INDEX: FIB4 SCORE: 1.35

## 2022-08-30 NOTE — PROGRESS NOTES
Pt presents to the [facility] for infliximab-dybb infusion. PIV established in L forearm; brisk blood return observed and pt tolerated well. Infliximab-dybb infused with filter over 1 hour with no s/s of adverse reactions. PIV flushed with brisk blood return observed before removing; gauze/coband dressing applied. Next appointment confirmed and education provided. Pt discharged to self care with all personal belongings and in NAD.

## 2022-09-26 ENCOUNTER — OFFICE VISIT (OUTPATIENT)
Dept: MEDICAL GROUP | Facility: PHYSICIAN GROUP | Age: 77
End: 2022-09-26
Payer: MEDICARE

## 2022-09-26 VITALS
OXYGEN SATURATION: 94 % | RESPIRATION RATE: 16 BRPM | BODY MASS INDEX: 21.41 KG/M2 | HEART RATE: 103 BPM | SYSTOLIC BLOOD PRESSURE: 116 MMHG | WEIGHT: 133.2 LBS | HEIGHT: 66 IN | TEMPERATURE: 98.6 F | DIASTOLIC BLOOD PRESSURE: 58 MMHG

## 2022-09-26 DIAGNOSIS — E11.9 TYPE 2 DIABETES MELLITUS WITHOUT COMPLICATION, WITHOUT LONG-TERM CURRENT USE OF INSULIN (HCC): ICD-10-CM

## 2022-09-26 DIAGNOSIS — G47.52 REM SLEEP BEHAVIOR DISORDER: Chronic | ICD-10-CM

## 2022-09-26 DIAGNOSIS — E78.2 MIXED HYPERLIPIDEMIA: ICD-10-CM

## 2022-09-26 PROCEDURE — 99214 OFFICE O/P EST MOD 30 MIN: CPT | Performed by: NURSE PRACTITIONER

## 2022-09-26 RX ORDER — POLYMYXIN B SULFATE AND TRIMETHOPRIM 1; 10000 MG/ML; [USP'U]/ML
SOLUTION OPHTHALMIC
COMMUNITY
Start: 2022-07-21 | End: 2022-09-26

## 2022-09-26 RX ORDER — GLIMEPIRIDE 2 MG/1
2 TABLET ORAL
Qty: 90 TABLET | Refills: 3 | Status: SHIPPED | OUTPATIENT
Start: 2022-09-26 | End: 2023-04-26

## 2022-09-26 RX ORDER — AMOXICILLIN AND CLAVULANATE POTASSIUM 875; 125 MG/1; MG/1
TABLET, FILM COATED ORAL
COMMUNITY
Start: 2022-07-23 | End: 2022-09-26

## 2022-09-26 RX ORDER — CLONAZEPAM 1 MG/1
1 TABLET ORAL NIGHTLY
Qty: 45 TABLET | Refills: 0 | Status: SHIPPED | OUTPATIENT
Start: 2022-09-26 | End: 2022-10-04 | Stop reason: SDUPTHER

## 2022-09-26 RX ORDER — PRAVASTATIN SODIUM 20 MG
20 TABLET ORAL NIGHTLY
Qty: 90 TABLET | Refills: 3 | Status: SHIPPED | OUTPATIENT
Start: 2022-09-26 | End: 2023-10-11

## 2022-09-26 ASSESSMENT — FIBROSIS 4 INDEX: FIB4 SCORE: 1.35

## 2022-09-26 NOTE — ASSESSMENT & PLAN NOTE
Chronic condition, stable.  Patient takes pravastatin 20 mg nightly.  He tolerates this without side-effect.  He does need a refill today and this was provided for him.  Continue current regimen.

## 2022-09-26 NOTE — PROGRESS NOTES
Subjective:     CC: Diagnoses of REM sleep behavior disorder, Mixed hyperlipidemia, and Type 2 diabetes mellitus without complication, without long-term current use of insulin (HCC) were pertinent to this visit.      HPI:   Phuc is an established patient of [facility] here for follow-up.  We discussed the following problems:    1. REM sleep behavior disorder  Chronic condition, stable. Patient is here for evaluation today.    2. Mixed hyperlipidemia  Chronic condition, stable. Patient is here for evaluation today.    3. Type 2 diabetes mellitus without complication, without long-term current use of insulin (HCC)  Chronic condition, stable. Patient is here for evaluation today.       Past Medical History:   Diagnosis Date    Diabetes (HCC)     Psoriatic arthritis ([facility])     REM sleep behavior disorder        Social History     Tobacco Use    Smoking status: Former    Smokeless tobacco: Never    Tobacco comments:     quit in 1994   Vaping Use    Vaping Use: Never used   Substance Use Topics    Alcohol use: Yes     Comment: occasional glass of wine    Drug use: Never       Current Outpatient Medications Ordered in Epic   Medication Sig Dispense Refill    clonazePAM (KLONOPIN) 1 MG Tab Take 1 Tablet by mouth every evening for 30 days. 45 Tablet 0    pravastatin (PRAVACHOL) 20 MG Tab Take 1 Tablet by mouth every evening. 90 Tablet 3    glimepiride (AMARYL) 2 MG Tab Take 1 Tablet by mouth 2 (two) times a day. 90 Tablet 3    methotrexate 2.5 MG Tab Take 6 Tablets by mouth every 7 days. 77 Tablet 3    folic acid (FOLVITE) 1 MG Tab Take 1 Tablet by mouth every day. 90 Tablet 3    inFLIXimab (REMICADE) 100 MG Recon Soln Infuse 600 mg into a venous catheter every 8 weeks.      therapeutic multivitamin-minerals (THERAGRAN-M) Tab Take 1 Tablet by mouth every day.      omeprazole (PRILOSEC) 20 MG delayed-release capsule Take 20 mg by mouth every day.      aspirin (ASA) 81 MG Chew Tab chewable tablet Chew 81 mg every day.      Melatonin  "10 MG Cap Take 20 mg by mouth every evening.      Acetaminophen (TYLENOL ARTHRITIS PAIN PO) Take 1,250 mg by mouth every day.      metformin (GLUCOPHAGE) 1000 MG tablet Take 1,000 mg by mouth 2 times a day with meals.      sildenafil (REVATIO) 20 MG tablet Take 20 mg by mouth as needed. Three as needed       No current Epic-ordered facility-administered medications on file.       Allergies:  Patient has no known allergies.    Health Maintenance: Deferred    ROS:  Gen: no fevers/chills, no changes in weight  Eyes: no changes in vision  ENT: no sore throat, no hearing loss, no bloody nose  Pulm: no sob, no cough  CV: no chest pain, no palpitations  GI: no nausea/vomiting, no diarrhea  : no dysuria  MSk: no myalgias  Skin: no rash  Neuro: no headaches, no numbness/tingling  Heme/Lymph: no easy bruising      Objective:       Exam:  /58 (BP Location: Right arm, Patient Position: Sitting, BP Cuff Size: Adult)   Pulse (!) 103   Temp 37 °C (98.6 °F) (Temporal)   Resp 16   Ht 1.68 m (5' 6.14\")   Wt 60.4 kg (133 lb 3.2 oz)   SpO2 94%   BMI 21.41 kg/m²  Body mass index is 21.41 kg/m².    Gen: Alert and oriented, No apparent distress.  Neck: Neck is supple without lymphadenopathy.  Ext: No clubbing, cyanosis, edema.    Labs: Dated: None    Assessment & Plan:     76 y.o. male with the following -     REM sleep behavior disorder  Chronic and ongoing.  Patient has had the diagnosis of REM sleep disorder for many years. He takes clonazepam 1-1.5 mg at nighttime to control this. Patient was initially referred at our first office visit to sleep medicine specialist for management of this. I had given him 60 days worth of his medication at that time until he could see a specialist.  Unfortunately, patient has not been able to get into a specialist and just finally found one that will see him in February 2023.  Patient is very upset that he would have to come in regularly and pay a copay for this medication.  Patient " would like to be given 3 months at one time, and then call for a refill.  I did explain to patient that it is against [facility] policy for us to provide the medication without an in person visit every three months.  Patient is visibly unhappy with this plan.  His provider in [place] did not require this of him.  Discussed that I will be transferring soon, and he will have to reestablish with another provider.  I have given him 30 days worth and he will see ALMA Kuo in one month to reestablish and for refills. I did let him know that he may expect to receive no more than three months supply at a time, and then will need an appointment for further refills.  Patient verbalized understanding.     Type 2 diabetes mellitus without complication, without long-term current use of insulin (HCC)  Chronic condition, stable.  Patient takes glimepiride 2 mg BID and metformin 1000 mg BID, with good control of his diabetes.  He is not due for updated labs at this time, but does need glimepiride refilled today.  Continue current regimen.      Mixed hyperlipidemia  Chronic condition, stable.  Patient takes pravastatin 20 mg nightly.  He tolerates this without side-effect.  He does need a refill today and this was provided for him.  Continue current regimen.      Orders Placed This Encounter    DISCONTD: polymixin-trimethoprim (POLYTRIM) 28343-4.1 UNIT/ML-% Solution    DISCONTD: amoxicillin-clavulanate (AUGMENTIN) 875-125 MG Tab    clonazePAM (KLONOPIN) 1 MG Tab    pravastatin (PRAVACHOL) 20 MG Tab    glimepiride (AMARYL) 2 MG Tab          Return in about 4 weeks (around 10/24/2022), or Establish with ALMA Kuo.    I have placed the below orders and discussed them with an approved delegating provider.  The MA is performing the below orders under the direction of Ingris Medrano MD.    Please note that this dictation was created using voice recognition software. I have made every reasonable attempt to correct obvious  errors, but I expect that there are errors of grammar and possibly content that I did not discover before finalizing the note.

## 2022-09-26 NOTE — ASSESSMENT & PLAN NOTE
Chronic and ongoing.  Patient has had the diagnosis of REM sleep disorder for many years. He takes clonazepam 1-1.5 mg at nighttime to control this. Patient was initially referred at our first office visit to sleep medicine specialist for management of this. I had given him 60 days worth of his medication at that time until he could see a specialist.  Unfortunately, patient has not been able to get into a specialist and just finally found one that will see him in February 2023.  Patient is very upset that he would have to come in regularly and pay a copay for this medication.  Patient would like to be given 3 months at one time, and then call for a refill.  I did explain to patient that it is against [facility] policy for us to provide the medication without an in person visit every three months.  Patient is visibly unhappy with this plan.  His provider in [place] did not require this of him.  Discussed that I will be transferring soon, and he will have to reestablish with another provider.  I have given him 30 days worth and he will see ALMA Kuo in one month to reestablish and for refills. I did let him know that he may expect to receive no more than three months supply at a time, and then will need an appointment for further refills.  Patient verbalized understanding.

## 2022-09-26 NOTE — ASSESSMENT & PLAN NOTE
Chronic condition, stable.  Patient takes glimepiride 2 mg BID and metformin 1000 mg BID, with good control of his diabetes.  He is not due for updated labs at this time, but does need glimepiride refilled today.  Continue current regimen.

## 2022-10-03 ASSESSMENT — ENCOUNTER SYMPTOMS: SLEEP DISTURBANCE: 0

## 2022-10-04 ENCOUNTER — OFFICE VISIT (OUTPATIENT)
Dept: SLEEP MEDICINE | Facility: MEDICAL CENTER | Age: 77
End: 2022-10-04
Payer: MEDICARE

## 2022-10-04 VITALS
HEART RATE: 100 BPM | RESPIRATION RATE: 14 BRPM | WEIGHT: 133 LBS | SYSTOLIC BLOOD PRESSURE: 118 MMHG | OXYGEN SATURATION: 95 % | BODY MASS INDEX: 20.88 KG/M2 | DIASTOLIC BLOOD PRESSURE: 66 MMHG | HEIGHT: 67 IN

## 2022-10-04 DIAGNOSIS — F51.04 CHRONIC INSOMNIA: ICD-10-CM

## 2022-10-04 DIAGNOSIS — G47.52 REM SLEEP BEHAVIOR DISORDER: Primary | Chronic | ICD-10-CM

## 2022-10-04 DIAGNOSIS — G47.30 SLEEP DISORDER BREATHING: ICD-10-CM

## 2022-10-04 PROCEDURE — 99204 OFFICE O/P NEW MOD 45 MIN: CPT | Performed by: STUDENT IN AN ORGANIZED HEALTH CARE EDUCATION/TRAINING PROGRAM

## 2022-10-04 RX ORDER — CLONAZEPAM 1 MG/1
1 TABLET ORAL NIGHTLY
Qty: 30 TABLET | Refills: 2 | Status: SHIPPED | OUTPATIENT
Start: 2022-10-04 | End: 2022-11-15 | Stop reason: SDUPTHER

## 2022-10-04 ASSESSMENT — FIBROSIS 4 INDEX: FIB4 SCORE: 1.35

## 2022-10-04 NOTE — PROGRESS NOTES
[facility] Consult Note     Date: 10/4/2022 / Time: 9:50 AM      Thank you for requesting a sleep medicine consultation on Phuc Mcdowell at the sleep center. Presents today with the chief complaints of s establishing care for REM behavior disorder. He is referred by JONE Perales  [address] [facility] [place] [address] for evaluation and treatment of REM behavior disorder.     HISTORY OF PRESENT ILLNESS:     Phuc Mcdowell is a 76 y.o. male with diabetes mellitus type 2, psoriatic arthritis, psoriasis, hyperlipidemia, history of sleep apnea and REM behavior disorder.  Presents to Sleep Clinic for evaluation of REM behavior disorder.    He is accompanied by his wife today's visit.    He reports starting having actions in his sleep approximately 15 years ago.  It was less frequent at that time and has worsened over the years.  Would like to be evaluated approximately 11 years ago and was a dream where he was playing softball and hit the ball and then ran to catch it.  This led to him running out of bed into the wall and leaving a hole in the wall.    He was evaluated in [place] where they previously lived until this year.  He underwent a sleep study in September 2017 which confirmed a diagnosis of REM behavior disorder with REM without atonia seen.  That study was negative for sleep apnea at that time.  Patient has a known history of sleep apnea and was on CPAP for a number of years.  He used to snore and have disruptions to his breathing however this is gone away over the years with weight loss.    Is currently having actions and is doing approximately 3-4 times a week.  However his wife reports that his breathing during REM sleep appears irregular every night.  He also tends to have movements every night but not necessarily reacting to stimuli.    He is currently taking 1 mg of Klonopin and 20 mg of melatonin for his REM behavior disorder.  He has tried higher doses of  "Klonopin but found that it made him too groggy the next day.  He did not notice a difference with the 20 mg melatonin versus a 10 mg melatonin.    Currently denies any snoring, choking gasping, unrefreshing sleep, excessive daytime sleepiness.    As per supplemental questionnaire to be scanned or imported into chart:    Sleep Schedule  Bedtime: Weekday & Weekend 1130pm  Wake time: Weekday & Weekend 745am  Sleep-onset latency: 10 min   Awakenings from sleep: 2-3  Difficulty falling back asleep: No  Bedroom partner: yes   Naps: Yes, sometimes     DAYTIME SYMPTOMS:   Excessive daytime sleepiness: No   Daytime fatigue: Yes  Difficulty concentrating: Yes  Memory problems: Yes  Irritability:No   Work/school performance issues: No   Sleepiness with driving: No   Caffeine/stimulant use: Yes, 3 cups in morning   Alcohol use:Yes, How Many? 2-3 glasses of wine a week      SLEEP RELATED SYMPTOMS  Snoring: No   Witnessed apnea or gasping/choking: No   Dry mouth or mouth breathing: Yes  Sweating: No   Teeth grinding/biting: No   Morning headaches: No   Refreshed Upon Awakening: Yes     SLEEP RELATED BEHAVIORS:  Parasomnias (walking, talking, eating, violence): Yes  Leg kicking: Yes  Restless legs - \"urge to move\": No   Nightmares: Yes Recurrent: No   Dream enactment: Yes     NARCOLEPSY:  Cataplexy: No   Sleep paralysis: No   Sleep attacks: No   Hypnagogic/hypnopompic hallucinations: No     MEDICAL HISTORY  Past Medical History:   Diagnosis Date    Back pain     Back pain     Chickenpox     Diabetes (HCC)     Double vision     Frequent urination     Heartburn     Hyperlipidemia     Influenza     Mumps     Nasal drainage     Painful joint     Psoriatic arthritis (HCC)     Rash     REM sleep behavior disorder     Rheumatoid arthritis (HCC)     Sleep apnea     Sore muscles     Tonsillitis     Wears glasses         SURGICAL HISTORY  Past Surgical History:   Procedure Laterality Date    CATARACT EXTRACTION WITH IOL Bilateral 2016    [place] " REMV 2ND CATARACT,CORN-SCLER SECTN          FAMILY HISTORY  Family History   Problem Relation Age of Onset    Cancer Mother         lung and brain -  at 88    Heart Disease Father         passed at 62    Hypertension Sister     Hypertension Brother     Cancer Brother        SOCIAL HISTORY  Social History     Socioeconomic History    Marital status:    Tobacco Use    Smoking status: Former    Smokeless tobacco: Never    Tobacco comments:     quit in    Vaping Use    Vaping Use: Never used   Substance and Sexual Activity    Alcohol use: Yes     Alcohol/week: 1.2 oz     Types: 2 Glasses of wine per week     Comment: occasional glass of wine    Drug use: Never    Sexual activity: Yes     Partners: Female     Birth control/protection: None        Occupation: Retired     CURRENT MEDICATIONS  Current Outpatient Medications   Medication Sig Dispense Refill    clonazePAM (KLONOPIN) 1 MG Tab Take 1 Tablet by mouth every evening for 30 days. 45 Tablet 0    pravastatin (PRAVACHOL) 20 MG Tab Take 1 Tablet by mouth every evening. 90 Tablet 3    glimepiride (AMARYL) 2 MG Tab Take 1 Tablet by mouth 2 (two) times a day. 90 Tablet 3    methotrexate 2.5 MG Tab Take 6 Tablets by mouth every 7 days. 77 Tablet 3    folic acid (FOLVITE) 1 MG Tab Take 1 Tablet by mouth every day. 90 Tablet 3    inFLIXimab (REMICADE) 100 MG Recon Soln Infuse 600 mg into a venous catheter every 8 weeks.      therapeutic multivitamin-minerals (THERAGRAN-M) Tab Take 1 Tablet by mouth every day.      omeprazole (PRILOSEC) 20 MG delayed-release capsule Take 20 mg by mouth every day.      aspirin (ASA) 81 MG Chew Tab chewable tablet Chew 81 mg every day.      Melatonin 10 MG Cap Take 20 mg by mouth every evening.      Acetaminophen (TYLENOL ARTHRITIS PAIN PO) Take 1,250 mg by mouth every day.      metformin (GLUCOPHAGE) 1000 MG tablet Take 1,000 mg by mouth 2 times a day with meals.      sildenafil (REVATIO) 20 MG tablet Take 20 mg by mouth as  "needed. Three as needed       No current facility-administered medications for this visit.       REVIEW OF SYSTEMS  Constitutional: Denies fevers, Denies weight changes  Ears/Nose/Throat/Mouth: Denies nasal congestion or sore throat   Cardiovascular: Denies chest pain  Respiratory: Denies shortness of breath, Denies cough  Gastrointestinal/Hepatic: Denies nausea, vomiting  Sleep: see HPI    Physical Examination:  Vitals/ General Appearance:   Weight/BMI: Body mass index is 20.83 kg/m².  /66 (BP Location: Left arm, Patient Position: Sitting, BP Cuff Size: Large adult)   Pulse 100   Resp 14   Ht 1.702 m (5' 7\")   Wt 60.3 kg (133 lb)   SpO2 95%   Vitals:    10/04/22 0935   BP: 118/66   BP Location: Left arm   Patient Position: Sitting   BP Cuff Size: Large adult   Pulse: 100   Resp: 14   SpO2: 95%   Weight: 60.3 kg (133 lb)   Height: 1.702 m (5' 7\")       Pt. is alert and oriented to time, place and person. Cooperative and in no apparent distress.     Constitutional: Alert, no distress, well-groomed.  Skin: No rashes in visible areas.  Eye: Round. Conjunctiva clear, lids normal. No icterus.   ENT EXAM  Nasal alae/valves collapsible: No   Nasal septum deviation: Yes  Nasal turbinate hypertrophy: Left: Grade 1   Right: Grade 1  Hard palate narrow: No   Hard palate high: No   Soft palate/uvula (Mallampati score): 3  Tongue Scalloping: No   Retrognathia: No   Micrognathia: No   Cardiovascular:no murmus/gallops/rubs, normal S1 and S2 heart sounds, regular rate and rhythm  Pulmonary:Clear to auscultation, No wheezes, No crackles.  Neurologic:Awake, alert and oriented x 3, Normal age appropriate gait, No involuntary motions.  Extremities: No clubbing, cyanosis, or edema       ASSESSMENT AND PLAN     Phuc Mcdowell is a 76 y.o. male with diabetes mellitus type 2, psoriatic arthritis, psoriasis, hyperlipidemia, history of sleep apnea and REM behavior disorder.  Presents to Sleep Clinic for evaluation of REM " behavior disorder.    1.  REM behavior disorder  Patient has known diagnosis of REM behavior disorder confirmed by history and in lab PSG at [facility] which showed REM without atonia.  The study was performed in 2017.  Patient has not noticed much benefit with decreasing the events with medication.  With using Klonopin 1mg and melatonin 20 mg there does not appear to be difference in frequency of events.  However he does find the Klonopin is helpful to his sleep such as him getting to sleep.    Discussed REM behavior disorder and pathophysiology.  Discussed medical conditions associated with REM behavior disorder.  Advised that making sure to decrease any disturbances to his sleep can help decrease events.  Specifically discussing obstructive sleep apnea as he has had this diagnosis in the past.  Reviewed bedroom precautions such as not having weapons in the bed, sharp corner furniture, or other objects near the bed which she could hit or used as a weapon.  Advised him and wife to continue to discuss safety around the bedroom and if she at all feels uncomfortable with his movements they may consider sleeping in separate beds.    Plan  -Continue Klonopin 1 mg at bedtime  -Advised to decrease melatonin dosage to 10 mg  -Continue bedroom precautions    Insomnia  Patient reports difficulty with sleep initiation.  He is finding that without Klonopin he cannot get to sleep.  If he does not take his Klonopin at night he will be awake for the whole night or if not hours.  He was not on the Klonopin prior to starting it for his REM behavior disorder.  He has not participated in CBT-I.    History of sleep apnea  His PSG study from 2017 at [facility] did not meet criteria for obstructive sleep apnea but did show REM without atonia.  He believes he did wear his CPAP machine up until the night before the sleep study.  States he regularly used it prior to being told he no longer had sleep apnea.  They do report significant  weight loss over the years since he was initially diagnosed with sleep apnea.  Discussed that given his RBD diagnosis may benefit from retesting for obstructive sleep apnea as the presence of sleep apnea can worsen frequency of events.    Plan  -Patient would like to retest via repeat sleep study in 2023.  -Encouraged him and his wife to monitor for any disruptions in his breathing which may be indicative of sleep apnea    Please note portions of this record was created using voice recognition software. I have made every reasonable attempt to correct obvious errors, but I expect that there are errors of grammar and possibly content I did not discover before finalizing the note.

## 2022-10-22 ASSESSMENT — RHEUMATOLOGY FOLLOW-UP QUESTIONNAIRE
CHILLS: N
MORNING STIFFNESS: 30-60 MINS
SNORING: N
SHORTNESS OF BREATH: N
FEVERS: N
BLURRINESS: Y
GOITER: N
DRY EYES: Y
ABDOMINAL PAIN: N
DRY MOUTH: Y
ABNORMAL DISCHARGE: N
PALPITATIONS: N
ACHILLES TENDON PAIN: N
GENITAL ULCERS: N
BODY ACHES: Y
NIGHT SWEATS: N
VOMITING: N
IRREGULAR BEATS: N
MARK ALL THE AREAS OF PAIN: 78464367
MUSCLE PAIN: N
VERTIGO: N
DIFFICULTY SWALLOWING: N
NASAL ULCERS: N
BLOOD IN URINE: N
ORAL ULCERS: N
EYE REDNESS: N
EAR PAIN: N
HEARTBURN: N
RATE_1TO10: 3
HAIR SHEDDING: N
VISION LOSS: N
CAVITIES: N
FROTHY URINE: N
BLOODY CONSTIPATION: N
RINGING IN EARS: Y
DRY COUGH: N
EYE PAIN: N
BURNING URINATION: N
HAIR LOSS WITH BALD SPOTS: N
THYROID PAIN: N
SINUS PAIN: Y
SORE THROAT: N
HEARING LOSS: N
MUSCLE WEAKNESS: Y
NECK PAIN: N
SKIN PLAQUES: Y
NAUSEA: N
JOINT SWELLING: N
JOINT PAIN: SAME WITH ACTIVITY
CHEST PAIN WITH BREATHING: N
COUGH WITH BLOODY PHLEGM: N
DIFFICULTY SPEAKING: N
PELVIC PAIN: N
NOSEBLEEDS: N
BLEEDING GUMS: N
SINONASAL CONGESTION: N
BLOODY DIARRHEA: N

## 2022-10-25 ENCOUNTER — OUTPATIENT INFUSION SERVICES (OUTPATIENT)
Dept: ONCOLOGY | Facility: MEDICAL CENTER | Age: 77
End: 2022-10-25
Attending: STUDENT IN AN ORGANIZED HEALTH CARE EDUCATION/TRAINING PROGRAM
Payer: MEDICARE

## 2022-10-25 ENCOUNTER — OFFICE VISIT (OUTPATIENT)
Dept: RHEUMATOLOGY | Facility: MEDICAL CENTER | Age: 77
End: 2022-10-25
Attending: STUDENT IN AN ORGANIZED HEALTH CARE EDUCATION/TRAINING PROGRAM
Payer: MEDICARE

## 2022-10-25 VITALS
HEART RATE: 92 BPM | TEMPERATURE: 97.6 F | WEIGHT: 137.57 LBS | OXYGEN SATURATION: 97 % | BODY MASS INDEX: 22.11 KG/M2 | RESPIRATION RATE: 18 BRPM | HEIGHT: 66 IN | DIASTOLIC BLOOD PRESSURE: 73 MMHG | SYSTOLIC BLOOD PRESSURE: 133 MMHG

## 2022-10-25 VITALS
DIASTOLIC BLOOD PRESSURE: 72 MMHG | SYSTOLIC BLOOD PRESSURE: 118 MMHG | RESPIRATION RATE: 16 BRPM | WEIGHT: 137.8 LBS | BODY MASS INDEX: 21.63 KG/M2 | HEIGHT: 67 IN | OXYGEN SATURATION: 94 % | TEMPERATURE: 97.4 F | HEART RATE: 92 BPM

## 2022-10-25 DIAGNOSIS — L40.50 PSORIATIC ARTHRITIS (HCC): ICD-10-CM

## 2022-10-25 DIAGNOSIS — Z79.60 LONG-TERM USE OF IMMUNOSUPPRESSANT MEDICATION: ICD-10-CM

## 2022-10-25 LAB
BASOPHILS # BLD AUTO: 0.3 % (ref 0–1.8)
BASOPHILS # BLD: 0.02 K/UL (ref 0–0.12)
EOSINOPHIL # BLD AUTO: 0.14 K/UL (ref 0–0.51)
EOSINOPHIL NFR BLD: 2.2 % (ref 0–6.9)
ERYTHROCYTE [DISTWIDTH] IN BLOOD BY AUTOMATED COUNT: 49.9 FL (ref 35.9–50)
ERYTHROCYTE [SEDIMENTATION RATE] IN BLOOD BY WESTERGREN METHOD: 17 MM/HOUR (ref 0–20)
HCT VFR BLD AUTO: 40.4 % (ref 42–52)
HGB BLD-MCNC: 13.3 G/DL (ref 14–18)
IMM GRANULOCYTES # BLD AUTO: 0.02 K/UL (ref 0–0.11)
IMM GRANULOCYTES NFR BLD AUTO: 0.3 % (ref 0–0.9)
LYMPHOCYTES # BLD AUTO: 2.38 K/UL (ref 1–4.8)
LYMPHOCYTES NFR BLD: 37.3 % (ref 22–41)
MCH RBC QN AUTO: 30.9 PG (ref 27–33)
MCHC RBC AUTO-ENTMCNC: 32.9 G/DL (ref 33.7–35.3)
MCV RBC AUTO: 93.7 FL (ref 81.4–97.8)
MONOCYTES # BLD AUTO: 0.58 K/UL (ref 0–0.85)
MONOCYTES NFR BLD AUTO: 9.1 % (ref 0–13.4)
NEUTROPHILS # BLD AUTO: 3.24 K/UL (ref 1.82–7.42)
NEUTROPHILS NFR BLD: 50.8 % (ref 44–72)
NRBC # BLD AUTO: 0 K/UL
NRBC BLD-RTO: 0 /100 WBC
OUTPT INFUS CBC COMMENT OICOM: ABNORMAL
PLATELET # BLD AUTO: 218 K/UL (ref 164–446)
PMV BLD AUTO: 9.8 FL (ref 9–12.9)
RBC # BLD AUTO: 4.31 M/UL (ref 4.7–6.1)
WBC # BLD AUTO: 6.4 K/UL (ref 4.8–10.8)

## 2022-10-25 PROCEDURE — 99212 OFFICE O/P EST SF 10 MIN: CPT | Performed by: STUDENT IN AN ORGANIZED HEALTH CARE EDUCATION/TRAINING PROGRAM

## 2022-10-25 PROCEDURE — 96413 CHEMO IV INFUSION 1 HR: CPT

## 2022-10-25 PROCEDURE — 700105 HCHG RX REV CODE 258: Performed by: STUDENT IN AN ORGANIZED HEALTH CARE EDUCATION/TRAINING PROGRAM

## 2022-10-25 PROCEDURE — 85025 COMPLETE CBC W/AUTO DIFF WBC: CPT

## 2022-10-25 PROCEDURE — 85652 RBC SED RATE AUTOMATED: CPT

## 2022-10-25 PROCEDURE — 99214 OFFICE O/P EST MOD 30 MIN: CPT | Performed by: STUDENT IN AN ORGANIZED HEALTH CARE EDUCATION/TRAINING PROGRAM

## 2022-10-25 PROCEDURE — 700111 HCHG RX REV CODE 636 W/ 250 OVERRIDE (IP): Mod: TB | Performed by: STUDENT IN AN ORGANIZED HEALTH CARE EDUCATION/TRAINING PROGRAM

## 2022-10-25 RX ORDER — SODIUM CHLORIDE 9 MG/ML
INJECTION, SOLUTION INTRAVENOUS CONTINUOUS
Status: CANCELLED | OUTPATIENT
Start: 2022-12-20

## 2022-10-25 RX ORDER — SODIUM CHLORIDE 9 MG/ML
INJECTION, SOLUTION INTRAVENOUS CONTINUOUS
Status: DISCONTINUED | OUTPATIENT
Start: 2022-10-25 | End: 2022-10-25 | Stop reason: HOSPADM

## 2022-10-25 RX ADMIN — INFLIXIMAB-AXXQ 600 MG: 100 INJECTION, POWDER, LYOPHILIZED, FOR SOLUTION INTRAVENOUS at 11:39

## 2022-10-25 RX ADMIN — SODIUM CHLORIDE: 9 INJECTION, SOLUTION INTRAVENOUS at 11:39

## 2022-10-25 ASSESSMENT — FIBROSIS 4 INDEX
FIB4 SCORE: 1.35
FIB4 SCORE: 1.35

## 2022-10-25 ASSESSMENT — PAIN DESCRIPTION - PAIN TYPE: TYPE: CHRONIC PAIN

## 2022-10-25 NOTE — PATIENT INSTRUCTIONS
AFTER VISIT INSTRUCTIONS    Below are important information to help you navigate your healthcare needs and help us serve you safely and effectively:  If laboratory tests and/or imaging studies were ordered, remember to go get them done.  If new prescriptions or refills were sent to the pharmacy, remember to go pick them up.  Take your medications exactly as prescribed unless instructed otherwise.  If there are significant findings on your lab tests and imaging studies that warrant further action, I will notify you with explanations via [employer] or phone call, otherwise you can view them on [employer] and let me know if you have any questions.  Sign up for [employer] if you have not already done so, in order to have access to the results of your lab tests and imaging studies, and to be able to send and receive messages from me.  Note that [employer] messages are typically read during office hours only and may take 1-7 business days before a response depending on the urgency of the situation and how busy my clinic schedule is.  In general, [employer] messaging is for non-urgent matters that do not require immediate attention, so for urgent matters that cannot wait, you are advised to go to an urgent care.

## 2022-10-25 NOTE — PROGRESS NOTES
[facility]  [address], [address], [place] [address]  Phone: [phone] ? Fax: [phone]    RHEUMATOLOGY FOLLOW-UP VISIT NOTE      DATE OF SERVICE: 10/25/2022    DATE OF LAST VISIT: Visit date not found         Subjective     PRIMARY CARE PROVIDER:  JONE Porras  [address]  [facility] [id number]    PATIENT IDENTIFICATION:  Phuc Mcdowell  [id number]  [facility] [address]    YOB: 1945    MEDICAL RECORD NUMBER: [id number]          CHIEF COMPLAINT:   Chief Complaint   Patient presents with    Follow-Up     Psoriatic Arthritis        RHEUMATOLOGIC HISTORY:  Phuc Mcdowell is a 76 y.o. male with pertinent history notable for psoriatic arthritis diagnosed in 2000 preceded by psoriasis diagnosed many years prior, and osteoarthritis of shoulders among other comorbidities. Previously under the care of a rheumatologist in [facility] prior to relocating to [place], he initially presented on 4/19/2022 to establish care. Reported no new skin plaques and only minimal joint pain in hands (mostly DIP joints) toward the end of infliximab infusion cycle. The joint pain was sometimes associated with 30 minutes of morning stiffness that improves with activity. Noted some weight loss due to changes he made in his diet and physical activity but otherwise doing well.     Pertinent treatment history as of 10/2022: Cyclosporine 100mg daily (years-4/2022), methotrexate 15mg weekly (increased from 7.5mg in 4/2022-present, effective), infliximab 600mg every 8 weeks (years-present, effective).    Pertinent labs as 7/2022: Elevated ESR 38 with normal CRP; mildly low Hgb 13.7 and Hct 40.9; negative QTB and HBV (in 5/2022), unremarkable CMP.    Pertinent imaging as of 11/2021 XRs: Pelvis with bilateral mild SI joint arthritis with partial osseous fusion. Shoulders with mild osteoarthritis of bilateral AC joints and left GH joint.    INTERVAL HISTORY:  [facility]  Established Patient History Form    10/22/2022 11:16 AM PDT - Filed by Patient   Chief Complaint joint pain and some psorisis breakout   Interval History of Present Condition   Date of worsening symptom onset: 8/31/2022   Preceding incident/ailment:    Describe/list your symptoms: Joint pain on left shoulder and elbow.  Minor breakout of psorisis in my scalp.  Stopped cyclosporine.   Aggravating factors:    Alleviating factors:    Helpful medications: Methotrexate and infliximab   Ineffective medications:    Symptom severity/impact (scale of 1-10): 3   Personal/emotional stressors:    [place] All The Locations Of Pain    REVIEW OF SYSTEMS    General   Fevers No   Chills No   Night sweats No   Unintentional Weight Loss None   Musculoskeletal   Joint pain Same with activity   Morning stiffness 30-60 mins   Joint swelling No   Achilles tendon pain No   Muscle pain No   Body Aches Yes   Dermatologic   Hair loss with bald spots No   Hair shedding No   Sunlight-induced skin rash    Skin thickening    Skin plaques Yes   Cold-induced color changes (white, purple, red on rewarming)    Neurologic/Psychiatric   Muscle weakness Yes   Spasms    Tingling    Numbness    Anxiety    Depression    Head/Eyes   Headaches    Temple pain    Dizziness    Dry eyes Yes   Eye pain No   Eye redness No   Blurriness Yes   Vision loss No   Ears/Nose   Ear pain No   Ringing in ears Yes   Vertigo No   Hearing loss No   Nasal ulcers No   Nosebleeds No   Sinus pain Yes   Sinonasal congestion No   Snoring No   Mouth/Throat   Oral ulcers No   Bleeding gums No   Dry mouth Yes   Cavities No   Sore throat No   Difficulty speaking No   Difficulty swallowing No   Neck/Lymphatics   Neck pain No   Thyroid pain No   Goiter No   Swollen Glands    Cardiac/Respiratory   Chest pain with breathing No   Dry cough No   Cough with bloody phlegm No   Shortness of breath No   Palpitations No   Irregular beats No   Gastrointestinal   Nausea No   Vomiting No   Heartburn No    Abdominal pain No   Bloody diarrhea No   Bloody constipation No   Genitourinary   Pelvic Pain No   Genital ulcers No   Abnormal discharge No   Burning urination No   Frothy urine No   Blood in urine No   Supplemental Information   Notable Life Changes/Adjustments Since Last Visit        ACTIVE PROBLEM LIST:  Patient Active Problem List   Diagnosis    Cerumen impaction    REM sleep behavior disorder    Psoriatic arthritis (HCC)    Ingrown toenail of right foot    Type 2 diabetes mellitus without complication, without long-term current use of insulin (HCC)    Mixed hyperlipidemia    Long-term use of immunosuppressant medication    Plaque psoriasis    Dysuria    Chronic pain of left elbow   No problem-specific Assessment & Plan notes found for this encounter.      PAST MEDICAL HISTORY:  Past Medical History:   Diagnosis Date    Back pain     Back pain     Chickenpox     Diabetes (HCC)     Double vision     Frequent urination     Heartburn     Hyperlipidemia     Influenza     Mumps     Nasal drainage     Painful joint     Psoriatic arthritis (HCC)     Rash     REM sleep behavior disorder     Rheumatoid arthritis (HCC)     Sleep apnea     Sore muscles     Tonsillitis     Wears glasses        PAST SURGICAL HISTORY:  Past Surgical History:   Procedure Laterality Date    CATARACT EXTRACTION WITH IOL Bilateral 2016    OH REMV 2ND CATARACT,CORN-SCLER SECTN         MEDICATIONS:  Current Outpatient Medications   Medication Sig Dispense Refill    clonazePAM (KLONOPIN) 1 MG Tab Take 1 Tablet by mouth every evening for 30 days. 30 Tablet 2    pravastatin (PRAVACHOL) 20 MG Tab Take 1 Tablet by mouth every evening. 90 Tablet 3    glimepiride (AMARYL) 2 MG Tab Take 1 Tablet by mouth 2 (two) times a day. 90 Tablet 3    methotrexate 2.5 MG Tab Take 6 Tablets by mouth every 7 days. 77 Tablet 3    folic acid (FOLVITE) 1 MG Tab Take 1 Tablet by mouth every day. 90 Tablet 3    inFLIXimab (REMICADE) 100 MG Recon Soln Infuse 600 mg into a  venous catheter every 8 weeks.      therapeutic multivitamin-minerals (THERAGRAN-M) Tab Take 1 Tablet by mouth every day.      omeprazole (PRILOSEC) 20 MG delayed-release capsule Take 20 mg by mouth every day.      aspirin (ASA) 81 MG Chew Tab chewable tablet Chew 81 mg every day.      Melatonin 10 MG Cap Take 20 mg by mouth every evening.      Acetaminophen (TYLENOL ARTHRITIS PAIN PO) Take 1,250 mg by mouth every day.      metformin (GLUCOPHAGE) 1000 MG tablet Take 1,000 mg by mouth 2 times a day with meals.      sildenafil (REVATIO) 20 MG tablet Take 20 mg by mouth as needed. Three as needed       No current facility-administered medications for this visit.     Facility-Administered Medications Ordered in Other Visits   Medication Dose Route Frequency Provider Last Rate Last Admin    NS infusion   Intravenous Continuous Piero Lewis M.D. 30 mL/hr at 10/25/22 1139 New Bag at 10/25/22 1139    inFLIXimab-axxq (Avsola) 600 mg in  mL infusion  600 mg Intravenous Once Piero Lewis M.D. 125 mL/hr at 10/25/22 1139 600 mg at 10/25/22 1139       ALLERGIES:   No Known Allergies    IMMUNIZATIONS:  Immunization History   Administered Date(s) Administered    Influenza Vaccine Adult HD 10/04/2022    MODERNA SARS-COV-2 VACCINE (12+) 2021, 2021, 2021, 10/04/2022       SOCIAL HISTORY:   Social History     Tobacco Use    Smoking status: Former    Smokeless tobacco: Never    Tobacco comments:     quit in    Vaping Use    Vaping Use: Never used   Substance Use Topics    Alcohol use: Yes     Alcohol/week: 1.2 oz     Types: 2 Glasses of wine per week     Comment: occasional glass of wine    Drug use: Never       FAMILY HISTORY:  Family History   Problem Relation Age of Onset    Cancer Mother         lung and brain -  at 88    Heart Disease Father         passed at 62    Hypertension Sister     Hypertension Brother     Cancer Brother             Objective     Vital Signs: /72 (BP  "Location: Left arm, Patient Position: Sitting, BP Cuff Size: Adult)   Pulse 92   Temp 36.3 °C (97.4 °F) (Temporal)   Resp 16   Ht 1.702 m (5' 7\")   Wt 62.5 kg (137 lb 12.8 oz)   SpO2 94% Body mass index is 21.58 kg/m².    General: Appears well and comfortable  Eyes: No scleral or conjunctival lesions  ENT: No apparent oral or nasal lesions  Head/Neck: No apparent scalp or neck lesions  Cardiovascular: Regular rate and rhythm  Respiratory: Breathing quiet and unlabored  Gastrointestinal: No organomegaly or abdominal masses  Integumentary: No significant cutaneous lesions or discolorations  Musculoskeletal: No significant joint tenderness, periarticular soft tissue swelling, warmth, erythema, or overt signs of synovitis; no significant restriction in range of motion of joints examined  Neurologic: No focal sensory or motor deficits  Psychiatric: Mood and affect appropriate      LABORATORY RESULTS REVIEWED AND INTERPRETED BY ME:  Lab Results   Component Value Date/Time    SEDRATEWES 38 (H) 07/29/2022 08:17 AM    CREACTPROT 0.56 07/29/2022 08:17 AM     Lab Results   Component Value Date/Time    HEPBSAG Non-Reactive 05/06/2022 02:18 PM    HEPBCORTOT NonReactive 05/06/2022 02:18 PM     Lab Results   Component Value Date/Time    ASTSGOT 25 07/29/2022 08:17 AM    ALTSGPT 18 07/29/2022 08:17 AM    ALKPHOSPHAT 74 07/29/2022 08:17 AM    TBILIRUBIN 0.2 07/29/2022 08:17 AM    TOTPROTEIN 7.6 07/29/2022 08:17 AM    ALBUMIN 4.1 07/29/2022 08:17 AM     Lab Results   Component Value Date/Time    SODIUM 136 07/29/2022 08:17 AM    POTASSIUM 4.7 07/29/2022 08:17 AM    CHLORIDE 102 07/29/2022 08:17 AM    CO2 23 07/29/2022 08:17 AM    GLUCOSE 144 (H) 07/29/2022 08:17 AM    BUN 16 07/29/2022 08:17 AM    CREATININE 1.11 07/29/2022 08:17 AM    CALCIUM 9.7 07/29/2022 08:17 AM     Lab Results   Component Value Date/Time    WBC 6.4 10/25/2022 11:16 AM    RBC 4.31 (L) 10/25/2022 11:16 AM    HEMOGLOBIN 13.3 (L) 10/25/2022 11:16 AM    " HEMATOCRIT 40.4 (L) 10/25/2022 11:16 AM    MCV 93.7 10/25/2022 11:16 AM    MCH 30.9 10/25/2022 11:16 AM    MCHC 32.9 (L) 10/25/2022 11:16 AM    RDW 49.9 10/25/2022 11:16 AM    PLATELETCT 218 10/25/2022 11:16 AM    MPV 9.8 10/25/2022 11:16 AM    NEUTS 3.24 10/25/2022 11:16 AM    LYMPHOCYTES 37.30 10/25/2022 11:16 AM    MONOCYTES 9.10 10/25/2022 11:16 AM    EOSINOPHILS 2.20 10/25/2022 11:16 AM    BASOPHILS 0.30 10/25/2022 11:16 AM     Lab Results   Component Value Date/Time    CHOLSTRLTOT 140 04/15/2022 08:15 AM    LDL 72 04/15/2022 08:15 AM    HDL 50 04/15/2022 08:15 AM    TRIGLYCERIDE 91 04/15/2022 08:15 AM    HBA1C 6.9 (H) 04/15/2022 08:15 AM       RADIOLOGY RESULTS REVIEWED AND INTERPRETED BY ME:  Results for orders placed in visit on 04/12/22    DX-SHOULDER 2+    Results for orders placed in visit on 04/12/22    DX-PELVIS-1 OR 2 VIEWS      All relevant laboratory and imaging results reported on this note were reviewed and interpreted by me.         Assessment & Plan     Phuc Mcdowell is a 76 y.o. male with history as noted above whose presentation merits the following diagnostic and clinical status impressions and recommendations:    1. Psoriatic arthritis (HCC)  Clinically low disease activity with no significant evidence of evolving or impending flare on the current regimen of methotrexate and infliximab, so no need for escalation of treatment at this time. However, given the potential for discordance between immunologic activity and clinical disease manifestations, reasonable to routinely check serologic markers of disease activity for complete assessment.  - Sed Rate  - CRP QUANTITIVE (NON-CARDIAC)  - Continue methotrexate 15 mg oral weekly with folic acid 1 mg daily  - Continue infliximab 600 mg IV infusion every 8 weeks     2. Long-term use of immunosuppressant medication  No present history, physical findings, or laboratory evidence to suggest significant adverse drug effects or opportunistic  infections.  - CBC WITH DIFFERENTIAL  - Comp Metabolic Panel  - Need to ascertain/address age-appropriate vaccines      FOLLOW-UP: Return in about 6 months (around 4/25/2023) for Short.         Thank you for the opportunity to participate in the care of Phuc Mcdowell.    Piero Lewis MD, MS  Rheumatologist, [facility] Rheumatology ? [facility]   of Clinical Medicine, Department of Internal Medicine  [facility] ? [facility]

## 2022-10-25 NOTE — PROGRESS NOTES
Pt [facility] for q 8 week Infliximab infusion for Psoriatic Arthritis. POC dicussed with pt and he agrees with plan. Pt with call light in reach for safety. Pt verbalized understanding to call for needs/assist.     PIV established, brisk blood return observed. Pt with lab order form Dr Lewis. Labs drawn as ordered. Pt medicated per MAR. Pt tolerated 1 hour infusion without s/s adverse reaction. PIV dc'd catheter tip intact, gauze and coban dressing applied. Pt discharged to self care, NAD. Pt's next appt confirmed 12/20/2022.

## 2022-11-01 SDOH — ECONOMIC STABILITY: HOUSING INSECURITY
IN THE LAST 12 MONTHS, WAS THERE A TIME WHEN YOU DID NOT HAVE A STEADY PLACE TO SLEEP OR SLEPT IN A SHELTER (INCLUDING NOW)?: NO

## 2022-11-01 SDOH — HEALTH STABILITY: PHYSICAL HEALTH: ON AVERAGE, HOW MANY MINUTES DO YOU ENGAGE IN EXERCISE AT THIS LEVEL?: 60 MIN

## 2022-11-01 SDOH — ECONOMIC STABILITY: FOOD INSECURITY: WITHIN THE PAST 12 MONTHS, YOU WORRIED THAT YOUR FOOD WOULD RUN OUT BEFORE YOU GOT MONEY TO BUY MORE.: NEVER TRUE

## 2022-11-01 SDOH — ECONOMIC STABILITY: INCOME INSECURITY: IN THE LAST 12 MONTHS, WAS THERE A TIME WHEN YOU WERE NOT ABLE TO PAY THE MORTGAGE OR RENT ON TIME?: NO

## 2022-11-01 SDOH — ECONOMIC STABILITY: FOOD INSECURITY: WITHIN THE PAST 12 MONTHS, THE FOOD YOU BOUGHT JUST DIDN'T LAST AND YOU DIDN'T HAVE MONEY TO GET MORE.: NEVER TRUE

## 2022-11-01 SDOH — ECONOMIC STABILITY: HOUSING INSECURITY: IN THE LAST 12 MONTHS, HOW MANY PLACES HAVE YOU LIVED?: 2

## 2022-11-01 SDOH — ECONOMIC STABILITY: INCOME INSECURITY: HOW HARD IS IT FOR YOU TO PAY FOR THE VERY BASICS LIKE FOOD, HOUSING, MEDICAL CARE, AND HEATING?: NOT VERY HARD

## 2022-11-01 SDOH — ECONOMIC STABILITY: TRANSPORTATION INSECURITY
IN THE PAST 12 MONTHS, HAS LACK OF RELIABLE TRANSPORTATION KEPT YOU FROM MEDICAL APPOINTMENTS, MEETINGS, WORK OR FROM GETTING THINGS NEEDED FOR DAILY LIVING?: NO

## 2022-11-01 SDOH — HEALTH STABILITY: PHYSICAL HEALTH: ON AVERAGE, HOW MANY DAYS PER WEEK DO YOU ENGAGE IN MODERATE TO STRENUOUS EXERCISE (LIKE A BRISK WALK)?: 3 DAYS

## 2022-11-01 SDOH — ECONOMIC STABILITY: TRANSPORTATION INSECURITY
IN THE PAST 12 MONTHS, HAS THE LACK OF TRANSPORTATION KEPT YOU FROM MEDICAL APPOINTMENTS OR FROM GETTING MEDICATIONS?: NO

## 2022-11-01 SDOH — ECONOMIC STABILITY: TRANSPORTATION INSECURITY
IN THE PAST 12 MONTHS, HAS LACK OF TRANSPORTATION KEPT YOU FROM MEETINGS, WORK, OR FROM GETTING THINGS NEEDED FOR DAILY LIVING?: NO

## 2022-11-01 SDOH — HEALTH STABILITY: MENTAL HEALTH
STRESS IS WHEN SOMEONE FEELS TENSE, NERVOUS, ANXIOUS, OR CAN'T SLEEP AT NIGHT BECAUSE THEIR MIND IS TROUBLED. HOW STRESSED ARE YOU?: NOT AT ALL

## 2022-11-01 ASSESSMENT — SOCIAL DETERMINANTS OF HEALTH (SDOH)
HOW OFTEN DO YOU HAVE A DRINK CONTAINING ALCOHOL: 2-4 TIMES A MONTH
WITHIN THE PAST 12 MONTHS, YOU WORRIED THAT YOUR FOOD WOULD RUN OUT BEFORE YOU GOT THE MONEY TO BUY MORE: NEVER TRUE
HOW OFTEN DO YOU HAVE SIX OR MORE DRINKS ON ONE OCCASION: NEVER
HOW MANY DRINKS CONTAINING ALCOHOL DO YOU HAVE ON A TYPICAL DAY WHEN YOU ARE DRINKING: 1 OR 2
HOW HARD IS IT FOR YOU TO PAY FOR THE VERY BASICS LIKE FOOD, HOUSING, MEDICAL CARE, AND HEATING?: NOT VERY HARD

## 2022-11-01 ASSESSMENT — LIFESTYLE VARIABLES
SKIP TO QUESTIONS 9-10: 1
HOW MANY STANDARD DRINKS CONTAINING ALCOHOL DO YOU HAVE ON A TYPICAL DAY: 1 OR 2
HOW OFTEN DO YOU HAVE SIX OR MORE DRINKS ON ONE OCCASION: NEVER
AUDIT-C TOTAL SCORE: 2
HOW OFTEN DO YOU HAVE A DRINK CONTAINING ALCOHOL: 2-4 TIMES A MONTH

## 2022-11-02 ENCOUNTER — APPOINTMENT (RX ONLY)
Dept: URBAN - METROPOLITAN AREA CLINIC 22 | Facility: CLINIC | Age: 77
Setting detail: DERMATOLOGY
End: 2022-11-02

## 2022-11-02 ENCOUNTER — PATIENT MESSAGE (OUTPATIENT)
Dept: HEALTH INFORMATION MANAGEMENT | Facility: OTHER | Age: 77
End: 2022-11-02

## 2022-11-02 ENCOUNTER — HOSPITAL ENCOUNTER (OUTPATIENT)
Facility: MEDICAL CENTER | Age: 77
End: 2022-11-02
Attending: NURSE PRACTITIONER
Payer: MEDICARE

## 2022-11-02 ENCOUNTER — OFFICE VISIT (OUTPATIENT)
Dept: MEDICAL GROUP | Facility: PHYSICIAN GROUP | Age: 77
End: 2022-11-02
Payer: MEDICARE

## 2022-11-02 VITALS
DIASTOLIC BLOOD PRESSURE: 72 MMHG | HEIGHT: 67 IN | RESPIRATION RATE: 20 BRPM | WEIGHT: 140 LBS | HEART RATE: 76 BPM | BODY MASS INDEX: 21.97 KG/M2 | TEMPERATURE: 97.6 F | SYSTOLIC BLOOD PRESSURE: 110 MMHG | OXYGEN SATURATION: 98 %

## 2022-11-02 DIAGNOSIS — L57.0 ACTINIC KERATOSIS: ICD-10-CM

## 2022-11-02 DIAGNOSIS — E11.9 TYPE 2 DIABETES MELLITUS WITHOUT COMPLICATION, WITHOUT LONG-TERM CURRENT USE OF INSULIN (HCC): Chronic | ICD-10-CM

## 2022-11-02 DIAGNOSIS — L40.59 OTHER PSORIATIC ARTHROPATHY: ICD-10-CM

## 2022-11-02 DIAGNOSIS — D22 MELANOCYTIC NEVI: ICD-10-CM

## 2022-11-02 DIAGNOSIS — Z71.89 OTHER SPECIFIED COUNSELING: ICD-10-CM

## 2022-11-02 DIAGNOSIS — R30.0 DYSURIA: Chronic | ICD-10-CM

## 2022-11-02 DIAGNOSIS — L82.1 OTHER SEBORRHEIC KERATOSIS: ICD-10-CM

## 2022-11-02 DIAGNOSIS — L81.4 OTHER MELANIN HYPERPIGMENTATION: ICD-10-CM

## 2022-11-02 DIAGNOSIS — N52.9 ERECTILE DYSFUNCTION, UNSPECIFIED ERECTILE DYSFUNCTION TYPE: ICD-10-CM

## 2022-11-02 DIAGNOSIS — D18.0 HEMANGIOMA: ICD-10-CM

## 2022-11-02 DIAGNOSIS — L40.0 PSORIASIS VULGARIS: ICD-10-CM

## 2022-11-02 PROBLEM — D48.5 NEOPLASM OF UNCERTAIN BEHAVIOR OF SKIN: Status: ACTIVE | Noted: 2022-11-02

## 2022-11-02 PROBLEM — D18.01 HEMANGIOMA OF SKIN AND SUBCUTANEOUS TISSUE: Status: ACTIVE | Noted: 2022-11-02

## 2022-11-02 PROBLEM — D22.5 MELANOCYTIC NEVI OF TRUNK: Status: ACTIVE | Noted: 2022-11-02

## 2022-11-02 LAB
APPEARANCE UR: CLEAR
BILIRUB UR STRIP-MCNC: NEGATIVE MG/DL
COLOR UR AUTO: YELLOW
GLUCOSE UR STRIP.AUTO-MCNC: NEGATIVE MG/DL
HBA1C MFR BLD: 7.6 % (ref 0–5.6)
INT CON NEG: NEGATIVE
INT CON POS: POSITIVE
KETONES UR STRIP.AUTO-MCNC: NEGATIVE MG/DL
LEUKOCYTE ESTERASE UR QL STRIP.AUTO: NEGATIVE
NITRITE UR QL STRIP.AUTO: NEGATIVE
PH UR STRIP.AUTO: 5.5 [PH] (ref 5–8)
PROT UR QL STRIP: NEGATIVE MG/DL
RBC UR QL AUTO: NEGATIVE
SP GR UR STRIP.AUTO: <=1.005
UROBILINOGEN UR STRIP-MCNC: 0.2 MG/DL

## 2022-11-02 PROCEDURE — 82570 ASSAY OF URINE CREATININE: CPT

## 2022-11-02 PROCEDURE — ? LIQUID NITROGEN

## 2022-11-02 PROCEDURE — ? BIOPSY BY SHAVE METHOD

## 2022-11-02 PROCEDURE — ? SUNSCREEN RECOMMENDATIONS

## 2022-11-02 PROCEDURE — ? ADDITIONAL NOTES

## 2022-11-02 PROCEDURE — 17000 DESTRUCT PREMALG LESION: CPT | Mod: 59

## 2022-11-02 PROCEDURE — ? COUNSELING

## 2022-11-02 PROCEDURE — 81002 URINALYSIS NONAUTO W/O SCOPE: CPT | Performed by: NURSE PRACTITIONER

## 2022-11-02 PROCEDURE — 17003 DESTRUCT PREMALG LES 2-14: CPT

## 2022-11-02 PROCEDURE — 11102 TANGNTL BX SKIN SINGLE LES: CPT

## 2022-11-02 PROCEDURE — 99203 OFFICE O/P NEW LOW 30 MIN: CPT | Mod: 25

## 2022-11-02 PROCEDURE — 83036 HEMOGLOBIN GLYCOSYLATED A1C: CPT | Performed by: NURSE PRACTITIONER

## 2022-11-02 PROCEDURE — 99214 OFFICE O/P EST MOD 30 MIN: CPT | Performed by: NURSE PRACTITIONER

## 2022-11-02 PROCEDURE — 82043 UR ALBUMIN QUANTITATIVE: CPT

## 2022-11-02 RX ORDER — SILDENAFIL CITRATE 20 MG/1
20 TABLET ORAL
Qty: 90 TABLET | Refills: 0 | Status: SHIPPED
Start: 2022-11-02 | End: 2022-12-20

## 2022-11-02 ASSESSMENT — LOCATION DETAILED DESCRIPTION DERM
LOCATION DETAILED: RIGHT SUPERIOR LATERAL MIDBACK
LOCATION DETAILED: RIGHT UPPER CUTANEOUS LIP
LOCATION DETAILED: RIGHT MEDIAL INFERIOR CHEST
LOCATION DETAILED: LEFT UPPER CUTANEOUS LIP
LOCATION DETAILED: LEFT SUPERIOR FOREHEAD
LOCATION DETAILED: LEFT PROXIMAL DORSAL FOREARM
LOCATION DETAILED: LEFT INFERIOR UPPER BACK
LOCATION DETAILED: LEFT LATERAL FRONTAL SCALP
LOCATION DETAILED: RIGHT CENTRAL FRONTAL SCALP

## 2022-11-02 ASSESSMENT — LOCATION SIMPLE DESCRIPTION DERM
LOCATION SIMPLE: LEFT FOREHEAD
LOCATION SIMPLE: LEFT UPPER BACK
LOCATION SIMPLE: LEFT FOREARM
LOCATION SIMPLE: RIGHT SCALP
LOCATION SIMPLE: CHEST
LOCATION SIMPLE: RIGHT LOWER BACK
LOCATION SIMPLE: RIGHT LIP
LOCATION SIMPLE: LEFT SCALP
LOCATION SIMPLE: LEFT LIP

## 2022-11-02 ASSESSMENT — LOCATION ZONE DERM
LOCATION ZONE: ARM
LOCATION ZONE: TRUNK
LOCATION ZONE: FACE
LOCATION ZONE: LIP
LOCATION ZONE: SCALP

## 2022-11-02 ASSESSMENT — FIBROSIS 4 INDEX: FIB4 SCORE: 2

## 2022-11-02 NOTE — PROGRESS NOTES
Subjective  Chief Complaint  Establish care to manage his chronic conditions    History of Present Illness  Phuc Mcdowell is a 76 y.o. male. This patient is here today to establish care.  His prior PCP was ALMA Jarquin.    Dysuria  Chronic and ongoing. He states that he gets a burning sensation when he urinates but it does not happen all the time, this happens 30-40% of the time he urinates. He states that he does have a slower stream but it has been that way for awhile. He has had UA's done and they have been normal and no signs of a UTI. At this point in time he would like to see a Urologist about this problem.    Type 2 diabetes mellitus without complication, without long-term current use of insulin (HCC)  Chronic, ongoing.  He indicates that he is feeling well and denies any symptoms referable to this diagnoses. Specifically denies chest pain, palpitations, dyspnea, orthopnea, PND or peripheral edema, polyuria, polydipsia, urinary complaints, abdominal complaints, myalgias, numbness, weakness or other related symptoms.   Medications: Glimepiride 2 mg BID and Metformin 1000 mg BID.  Glucose at home: NA.  Hypoglycemia events: Denies.  Statin: Pravastatin.  ACEI/ARB: None.  ASA: Yes.  Exercise: Some, tries to walk when he can.  Last eye exam: 6/2022. Denies visual blurring, double vision, eye pain and floaters.  Last monofilament foot exam: Unknown. Denies foot pain, numbness, calluses, ulcers.  Diabetic complications: none  A1c:   Lab Results   Component Value Date/Time    HBA1C 7.6 (A) 11/02/2022 01:12 AM    HBA1C 6.9 (H) 04/15/2022 08:15 AM    HBA1C 7.5 (A) 12/20/2021 12:00 AM         Erectile dysfunction  Chronic and ongoing. Currently taking Sildenafil 20 mg as needed daily. He is requesting a refill today.    Past Medical History    Allergies: Patient has no known allergies.  Past Medical History:   Diagnosis Date    Back pain     Back pain     Chickenpox     Diabetes (HCC)     Double vision      Frequent urination     Heartburn     Hyperlipidemia     Influenza     Mumps     Nasal drainage     Painful joint     Psoriatic arthritis (HCC)     Rash     REM sleep behavior disorder     Rheumatoid arthritis (HCC)     Sleep apnea     Sore muscles     Tonsillitis     Wears glasses      Past Surgical History:   Procedure Laterality Date    CATARACT EXTRACTION WITH IOL Bilateral 2016    SD REMV 2ND CATARACT,CORN-SCLER SECTN       Current Outpatient Medications Ordered in Epic   Medication Sig Dispense Refill    metformin (GLUCOPHAGE) 1000 MG tablet Take 1 Tablet by mouth 2 times a day with meals. 180 Tablet 3    sildenafil (REVATIO) 20 MG tablet Take 1 Tablet by mouth 1 time a day as needed (Erictle Dysfunction). 90 Tablet 0    clonazePAM (KLONOPIN) 1 MG Tab Take 1 Tablet by mouth every evening for 30 days. 30 Tablet 2    pravastatin (PRAVACHOL) 20 MG Tab Take 1 Tablet by mouth every evening. 90 Tablet 3    glimepiride (AMARYL) 2 MG Tab Take 1 Tablet by mouth 2 (two) times a day. 90 Tablet 3    methotrexate 2.5 MG Tab Take 6 Tablets by mouth every 7 days. 77 Tablet 3    folic acid (FOLVITE) 1 MG Tab Take 1 Tablet by mouth every day. 90 Tablet 3    inFLIXimab (REMICADE) 100 MG Recon Soln Infuse 600 mg into a venous catheter every 8 weeks.      therapeutic multivitamin-minerals (THERAGRAN-M) Tab Take 1 Tablet by mouth every day.      omeprazole (PRILOSEC) 20 MG delayed-release capsule Take 20 mg by mouth every day.      aspirin (ASA) 81 MG Chew Tab chewable tablet Chew 81 mg every day.      Melatonin 10 MG Cap Take 20 mg by mouth every evening.      Acetaminophen (TYLENOL ARTHRITIS PAIN PO) Take 1,250 mg by mouth every day.       No current [facility]-ordered facility-administered medications on file.     Family History:    Family History   Problem Relation Age of Onset    Cancer Mother         lung and brain -  at 88    Heart Disease Father         passed at 62    Hypertension Sister     Hypertension Brother     Cancer  "Brother       Personal/Social History:    Social History     Tobacco Use    Smoking status: Former    Smokeless tobacco: Never    Tobacco comments:     quit in 1994   Vaping Use    Vaping Use: Never used   Substance Use Topics    Alcohol use: Yes     Alcohol/week: 1.2 oz     Types: 2 Glasses of wine per week     Comment: occasional glass of wine    Drug use: Never     Social History     Social History Narrative    Not on file      Review of Systems:     General: Negative for fever/chills and unexpected weight change.    Respiratory:  Negative for cough and dyspnea.     Cardiovascular:  Negative for chest pain and palpitations.   Genitourinary:  Negative for hematuria. Positive for dysuria.   Musculoskeletal:  Negative for myalgias.    Skin:  Negative for rash.    Objective  Physical Exam:   /72 (BP Location: Left arm, Patient Position: Sitting, BP Cuff Size: Adult)   Pulse 76   Temp 36.4 °C (97.6 °F) (Temporal)   Resp 20   Ht 1.702 m (5' 7\")   Wt 63.5 kg (140 lb)   SpO2 98%  Body mass index is 21.93 kg/m².  General:  Alert and oriented.  Well appearing.  NAD.  Head:  Normocephalic.   Neck: Supple without JVD. No lymphadenopathy.  Pulmonary:  Normal effort.  Clear to ausculation without rales, ronchi, or wheezing.  Cardiovascular:  Regular rate and rhythm without murmur, rubs or gallop.  Radial pulses are intact and equal bilaterally.  Musculoskeletal:  No extremity cyanosis, clubbing, or edema.  Skin:  Warm and dry.  No obvious lesions.  Psych: Normal mood and affect. Alert and oriented x3. Judgment and insight is normal.    Monofilament testing with a 10 gram force: sensation intact: intact bilaterally  Visual Inspection: Feet without maceration, ulcers, fissures.  Pedal pulses: intact bilaterally      Assessment/Plan   1. Type 2 diabetes mellitus without complication, without long-term current use of insulin (HCC)  Chronic and ongoing.  Continue to take Metformin 1000 mg BID.  Continue to take " Glimepiride 2 mg BID.  Educated on a healthy diet and exercise.  Due for updated labs.  - POCT Hemoglobin A1C  - Microalbumin Creat Ratio Urine (Clinic Collect); Future  - Diabetic Monofilament LE Exam  - metformin (GLUCOPHAGE) 1000 MG tablet; Take 1 Tablet by mouth 2 times a day with meals.  Dispense: 180 Tablet; Refill: 3    2. Dysuria  Chronic and ongoing.  Discussed with him that his UA is negative and does not show any signs of infection, he verbalized understanding.  At this time he would like a referral to Urology to further discuss his symptoms, referral placed.  - POCT Urinalysis  - Referral to Urology    3. Erectile dysfunction, unspecified erectile dysfunction type  Chronic and ongoing.  Continue to take Sildenafil 20 mg as needed daily.  - sildenafil (REVATIO) 20 MG tablet; Take 1 Tablet by mouth 1 time a day as needed (Erictle Dysfunction).  Dispense: 90 Tablet; Refill: 0      Health Maintenance: Discussed with the patient.    Return in about 3 months (around 2/2/2023) for F/U.    I have placed the above orders and discussed them with an approved delegating provider.  The MA is performing the below orders under the direction of Dr. Richard Gibbons MD/DO.     Please note that this dictation was created using voice recognition software. I have made every reasonable attempt to correct obvious errors, but I expect that there are errors of grammar and possibly content that I did not discover before finalizing the note.    ALMA Kuo  [facility]

## 2022-11-02 NOTE — ASSESSMENT & PLAN NOTE
Chronic, ongoing.  He indicates that he is feeling well and denies any symptoms referable to this diagnoses. Specifically denies chest pain, palpitations, dyspnea, orthopnea, PND or peripheral edema, polyuria, polydipsia, urinary complaints, abdominal complaints, myalgias, numbness, weakness or other related symptoms.   Medications: Glimepiride 2 mg BID and Metformin 1000 mg BID.  Glucose at home: NA.  Hypoglycemia events: Denies.  Statin: Pravastatin.  ACEI/ARB: None.  ASA: Yes.  Exercise: Some, tries to walk when he can.  Last eye exam: 6/2022. Denies visual blurring, double vision, eye pain and floaters.  Last monofilament foot exam: Unknown. Denies foot pain, numbness, calluses, ulcers.  Diabetic complications: none  A1c:   Lab Results   Component Value Date/Time    HBA1C 7.6 (A) 11/02/2022 01:12 AM    HBA1C 6.9 (H) 04/15/2022 08:15 AM    HBA1C 7.5 (A) 12/20/2021 12:00 AM

## 2022-11-02 NOTE — PROCEDURE: BIOPSY BY SHAVE METHOD
Detail Level: Detailed
Depth Of Biopsy: dermis
Was A Bandage Applied: Yes
Size Of Lesion In Cm: 0.4
X Size Of Lesion In Cm: 0.5
Biopsy Type: H and E
Biopsy Method: Dermablade
Anesthesia Type: 1% lidocaine with epinephrine
Additional Anesthesia Volume In Cc (Will Not Render If 0): 0
Hemostasis: Drysol
Wound Care: Petrolatum
Dressing: bandage
Destruction After The Procedure: No
Type Of Destruction Used: Curettage
Curettage Text: The wound bed was treated with curettage after the biopsy was performed.
Cryotherapy Text: The wound bed was treated with cryotherapy after the biopsy was performed.
Electrodesiccation Text: The wound bed was treated with electrodesiccation after the biopsy was performed.
Electrodesiccation And Curettage Text: The wound bed was treated with electrodesiccation and curettage after the biopsy was performed.
Silver Nitrate Text: The wound bed was treated with silver nitrate after the biopsy was performed.
Lab: [id number]
Lab Facility: 
Consent: Written consent was obtained and risks were reviewed including but not limited to scarring, infection, bleeding, scabbing, incomplete removal, nerve damage and allergy to anesthesia.
Post-Care Instructions: I reviewed with the patient in detail post-care instructions. Patient is to keep the biopsy site dry overnight, and then apply bacitracin twice daily until healed. Patient may apply hydrogen peroxide soaks to remove any crusting.
Notification Instructions: Patient will be notified of biopsy results. However, patient instructed to call the office if not contacted within 2 weeks.
Billing Type: Third-Party Bill
Information: Selecting Yes will display possible errors in your note based on the variables you have selected. This validation is only offered as a suggestion for you. PLEASE NOTE THAT THE VALIDATION TEXT WILL BE REMOVED WHEN YOU FINALIZE YOUR NOTE. IF YOU WANT TO FAX A PRELIMINARY NOTE YOU WILL NEED TO TOGGLE THIS TO 'NO' IF YOU DO NOT WANT IT IN YOUR FAXED NOTE.

## 2022-11-02 NOTE — PROCEDURE: MIPS QUALITY
Detail Level: Detailed
Quality 226: Preventive Care And Screening: Tobacco Use: Screening And Cessation Intervention: Patient screened for tobacco use and is an ex/non-smoker
Quality 130: Documentation Of Current Medications In The Medical Record: Current Medications Documented
Quality 111:Pneumonia Vaccination Status For Older Adults: Pneumococcal vaccine (PPSV23) administered on or after patient’s 60th birthday and before the end of the measurement period

## 2022-11-02 NOTE — PROCEDURE: ADDITIONAL NOTES
Additional Notes: Patient has hx of psoriasis and psoriatic arthritis.   He was on MTX, cyclosporine and remicaide but was able to discontinue the cyclosporine recently under the direction of Dr. Bennett    He is currently stable and does not require any topical therapy.
Detail Level: Simple
Render Risk Assessment In Note?: no

## 2022-11-02 NOTE — ASSESSMENT & PLAN NOTE
Chronic and ongoing. Currently taking Sildenafil 20 mg as needed daily. He is requesting a refill today.

## 2022-11-02 NOTE — ASSESSMENT & PLAN NOTE
Chronic and ongoing. He states that he gets a burning sensation when he urinates but it does not happen all the time, this happens 30-40% of the time he urinates. He states that he does have a slower stream but it has been that way for awhile. He has had UA's done and they have been normal and no signs of a UTI. At this point in time he would like to see a Urologist about this problem.

## 2022-11-02 NOTE — PROCEDURE: LIQUID NITROGEN
Consent: The patient's consent was obtained including but not limited to risks of crusting, scabbing, blistering, scarring, darker or lighter pigmentary change, recurrence, incomplete removal and infection.
Detail Level: Detailed
Render Note In Bullet Format When Appropriate: No
Duration Of Freeze Thaw-Cycle (Seconds): 3
Post-Care Instructions: I reviewed with the patient in detail post-care instructions. Patient is to wear sunprotection, and avoid picking at any of the treated lesions. Pt may apply Vaseline to crusted or scabbing areas.
Number Of Freeze-Thaw Cycles: 2 freeze-thaw cycles
Show Applicator Variable?: Yes

## 2022-11-03 ENCOUNTER — TELEPHONE (OUTPATIENT)
Dept: MEDICAL GROUP | Facility: PHYSICIAN GROUP | Age: 77
End: 2022-11-03
Payer: MEDICARE

## 2022-11-03 LAB
CREAT UR-MCNC: 24.59 MG/DL
MICROALBUMIN UR-MCNC: 2.1 MG/DL
MICROALBUMIN/CREAT UR: 85 MG/G (ref 0–30)

## 2022-11-03 NOTE — TELEPHONE ENCOUNTER
FINAL PRIOR AUTHORIZATION STATUS:    1.  Name of Medication & Dose: sindenafil      2. Prior Auth Status: Denied.  Reason: does not meet insurance requirements    3. Action Taken: Pharmacy Notified: no Patient Notified: no

## 2022-11-15 DIAGNOSIS — G47.52 REM SLEEP BEHAVIOR DISORDER: Chronic | ICD-10-CM

## 2022-11-15 RX ORDER — CLONAZEPAM 1 MG/1
1 TABLET ORAL NIGHTLY
Qty: 30 TABLET | Refills: 1 | Status: SHIPPED | OUTPATIENT
Start: 2022-11-15 | End: 2022-12-19 | Stop reason: SDUPTHER

## 2022-11-15 NOTE — TELEPHONE ENCOUNTER
Have we ever prescribed this med? Yes.  If yes, what date? 10/4/2022    Last OV: 10/4/2022- Dr. Hernandez    Next OV: 4/4/2023 - Dr. Hernandez     DX: REM sleep behavior disorder (G47.52)    Medications: clonazePAM (KLONOPIN) 1 MG Tab

## 2022-11-16 NOTE — TELEPHONE ENCOUNTER
Called pt and informed that we received a refill request for clonazePAM (KLONOPIN) 1 MG Tab and it was approve and sent to [facility] [id number] - [place], [place] - [address]

## 2022-11-18 ENCOUNTER — OFFICE VISIT (OUTPATIENT)
Dept: URGENT CARE | Facility: PHYSICIAN GROUP | Age: 77
End: 2022-11-18
Payer: MEDICARE

## 2022-11-18 VITALS
HEART RATE: 88 BPM | OXYGEN SATURATION: 96 % | DIASTOLIC BLOOD PRESSURE: 74 MMHG | RESPIRATION RATE: 16 BRPM | SYSTOLIC BLOOD PRESSURE: 118 MMHG | TEMPERATURE: 97.8 F

## 2022-11-18 DIAGNOSIS — E78.2 MIXED HYPERLIPIDEMIA: ICD-10-CM

## 2022-11-18 DIAGNOSIS — Z79.60 LONG-TERM USE OF IMMUNOSUPPRESSANT MEDICATION: ICD-10-CM

## 2022-11-18 DIAGNOSIS — E11.9 TYPE 2 DIABETES MELLITUS WITHOUT COMPLICATION, WITHOUT LONG-TERM CURRENT USE OF INSULIN (HCC): Chronic | ICD-10-CM

## 2022-11-18 DIAGNOSIS — U07.1 COVID-19 VIRUS INFECTION: ICD-10-CM

## 2022-11-18 PROCEDURE — 99214 OFFICE O/P EST MOD 30 MIN: CPT | Mod: CS | Performed by: STUDENT IN AN ORGANIZED HEALTH CARE EDUCATION/TRAINING PROGRAM

## 2022-11-19 NOTE — PROGRESS NOTES
Subjective:   CHIEF COMPLAINT  Chief Complaint   Patient presents with    URI     Fever x 1 day with congestion and states he tested positive for COVID at home today        HPI  Phuc Mcdowell is a 76 y.o. male who presents with COVID-19 infection.  Tested positive today.  2 days ago the patient developed a headache.  Then yesterday developed nausea, tactile fever and sinus congestion.  He has tried using a Veda pot which has provided limited relief of symptoms.  Denies any vomiting.  No wheezing or shortness of breath.  Normal appetite.  Accompanied by his wife who is not feeling sick.  Patient is vaccinated against COVID x3 including bivalent booster.    REVIEW OF SYSTEMS  General: no fever or chills  GI: no nausea or vomiting  See HPI for further details.    PAST MEDICAL HISTORY  Patient Active Problem List    Diagnosis Date Noted    Erectile dysfunction 11/02/2022    Chronic pain of left elbow 07/26/2022    Dysuria 06/29/2022    Plaque psoriasis 04/19/2022    Long-term use of immunosuppressant medication 04/17/2022    Mixed hyperlipidemia 04/12/2022    Cerumen impaction 04/11/2022    REM sleep behavior disorder 04/11/2022    Psoriatic arthritis (HCC) 04/11/2022    Ingrown toenail of right foot 04/11/2022    Type 2 diabetes mellitus without complication, without long-term current use of insulin (HCC) 04/11/2022       SURGICAL HISTORY   has a past surgical history that includes cataract extraction with iol (Bilateral, 2016) and remv 2nd cataract,corn-scler sectn.    ALLERGIES  No Known Allergies    CURRENT MEDICATIONS  aspirin Chew  clonazePAM Tabs  folic acid Tabs  glimepiride Tabs  inFLIXimab Solr  Melatonin Caps  metformin  methotrexate Tabs  Nirmatrelvir&Ritonavir 300/100 Tbpk  omeprazole  pravastatin Tabs  sildenafil  therapeutic multivitamin-minerals Tabs  TYLENOL ARTHRITIS PAIN PO    SOCIAL HISTORY  Social History     Tobacco Use    Smoking status: Former    Smokeless tobacco: Never    Tobacco  comments:     quit in    Vaping Use    Vaping Use: Never used   Substance and Sexual Activity    Alcohol use: Yes     Alcohol/week: 1.2 oz     Types: 2 Glasses of wine per week     Comment: occasional glass of wine    Drug use: Never    Sexual activity: Yes     Partners: Female     Birth control/protection: None       FAMILY HISTORY  Family History   Problem Relation Age of Onset    Cancer Mother         lung and brain -  at 88    Heart Disease Father         passed at 62    Hypertension Sister     Hypertension Brother     Cancer Brother           Objective:   PHYSICAL EXAM  VITAL SIGNS: /74 (BP Location: Left arm, Patient Position: Sitting, BP Cuff Size: Adult)   Pulse 88   Temp 36.6 °C (97.8 °F) (Temporal)   Resp 16   SpO2 96%     Gen: no acute distress, normal voice  Skin: dry, intact, moist mucosal membranes  Eyes: No conjunctival injection bilaterally.  Neck: Normal range of motion. No meningeal signs.   Lungs: CTAB w/ symmetric expansion  CV: RRR w/o murmurs or clicks  Psych: normal affect, normal judgement, alert, awake    Assessment/Plan:     1. COVID-19 virus infection  Nirmatrelvir&Ritonavir 300/100 20 x 150 MG & 10 x 100MG Tablet Therapy Pack      2. Mixed hyperlipidemia        3. Type 2 diabetes mellitus without complication, without long-term current use of insulin ([facility])        4. Long-term use of immunosuppressant medication        Patient tested positive for COVID-19 which would explain his symptoms.  Given his age and past medical history he is at high risk for developing severe COVID and recommended starting antiviral; patient agreed.  Reviewed home medications and is currently on sildenafil but no additional drug drug interactions with paxlovid.  He has not taken sildenafil in 4 days.  He has good kidney function from most recent labs.  - Ordered Rx for paxlovid  - Do not take sildenafil for 1 week  - Instructed to push fluids; at least 2 L of water a day  - Return to urgent care  any new/worsening symptoms or further questions or concerns.  Patient understood everything discussed.  All questions were answered.      Differential diagnosis, natural history, supportive care, and indications for immediate follow-up discussed. All questions answered. Patient agrees with the plan of care.    Follow-up as needed if symptoms worsen or fail to improve to PCP, Urgent care or Emergency Room.    Please note that this dictation was created using voice recognition software. I have made a reasonable attempt to correct obvious errors, but I expect that there are errors of grammar and possibly content that I did not discover before finalizing the note.

## 2022-12-19 DIAGNOSIS — G47.52 REM SLEEP BEHAVIOR DISORDER: Chronic | ICD-10-CM

## 2022-12-19 RX ORDER — CLONAZEPAM 1 MG/1
1 TABLET ORAL NIGHTLY
Qty: 30 TABLET | Refills: 1 | Status: SHIPPED | OUTPATIENT
Start: 2022-12-19 | End: 2023-01-23 | Stop reason: SDUPTHER

## 2022-12-19 NOTE — TELEPHONE ENCOUNTER
Have we ever prescribed this med? Yes.  If yes, what date? 11/15/2022    Last OV: 10/4/2022 - Dr. Hernandez     Next OV: 4/4/2023 - Dr. Hernandez    DX: REM sleep behavior disorder (G47.52)    Medications: clonazePAM (KLONOPIN) 1 MG Tab

## 2022-12-20 ENCOUNTER — OUTPATIENT INFUSION SERVICES (OUTPATIENT)
Dept: ONCOLOGY | Facility: MEDICAL CENTER | Age: 77
End: 2022-12-20
Attending: STUDENT IN AN ORGANIZED HEALTH CARE EDUCATION/TRAINING PROGRAM
Payer: MEDICARE

## 2022-12-20 VITALS
RESPIRATION RATE: 18 BRPM | HEART RATE: 96 BPM | HEIGHT: 66 IN | SYSTOLIC BLOOD PRESSURE: 127 MMHG | BODY MASS INDEX: 23.03 KG/M2 | WEIGHT: 143.3 LBS | OXYGEN SATURATION: 96 % | DIASTOLIC BLOOD PRESSURE: 74 MMHG | TEMPERATURE: 97.1 F

## 2022-12-20 DIAGNOSIS — L40.50 PSORIATIC ARTHRITIS (HCC): ICD-10-CM

## 2022-12-20 PROCEDURE — 700105 HCHG RX REV CODE 258: Performed by: STUDENT IN AN ORGANIZED HEALTH CARE EDUCATION/TRAINING PROGRAM

## 2022-12-20 PROCEDURE — 700111 HCHG RX REV CODE 636 W/ 250 OVERRIDE (IP): Mod: TB | Performed by: STUDENT IN AN ORGANIZED HEALTH CARE EDUCATION/TRAINING PROGRAM

## 2022-12-20 PROCEDURE — 96413 CHEMO IV INFUSION 1 HR: CPT

## 2022-12-20 RX ORDER — SODIUM CHLORIDE 9 MG/ML
INJECTION, SOLUTION INTRAVENOUS CONTINUOUS
Status: CANCELLED | OUTPATIENT
Start: 2023-02-14

## 2022-12-20 RX ORDER — TADALAFIL 5 MG/1
TABLET ORAL
COMMUNITY
Start: 2022-12-13 | End: 2023-05-02

## 2022-12-20 RX ADMIN — INFLIXIMAB-AXXQ 600 MG: 100 INJECTION, POWDER, LYOPHILIZED, FOR SOLUTION INTRAVENOUS at 13:58

## 2022-12-20 ASSESSMENT — FIBROSIS 4 INDEX: FIB4 SCORE: 2.03

## 2022-12-20 ASSESSMENT — PAIN DESCRIPTION - PAIN TYPE: TYPE: CHRONIC PAIN

## 2022-12-20 NOTE — TELEPHONE ENCOUNTER
CORNELIOM for the pt informing him that we received a refill request for clonazePAM (KLONOPIN) 1 MG Tab and it was approve and sent to the [facility] # [id number] - [place], [place] - [id number]

## 2022-12-21 NOTE — PROGRESS NOTES
Phuc arrives to [facility] for q 8 week Avsola for psoriatic arthritis. Patient denies acute health concerns. Denies s/s active infections. Denies receiving live vaccines in the past 4 weeks. Patient reports receiving positive effects from infusions. 22g PIV placed to L-FA, which flushes easily and has brisk blood return. Avsola infused, with an inline 0.2 micron filter, over 1 hour without adverse s/s. Patient tolerated treatment well. PIV flushed and removed with tip intact. Emailed scheduling regarding next appt. Discharged home to self care in no apparent distress.

## 2023-01-23 DIAGNOSIS — G47.52 REM SLEEP BEHAVIOR DISORDER: Chronic | ICD-10-CM

## 2023-01-23 RX ORDER — CLONAZEPAM 1 MG/1
1 TABLET ORAL NIGHTLY
Qty: 30 TABLET | Refills: 1 | Status: SHIPPED | OUTPATIENT
Start: 2023-01-23 | End: 2023-02-27 | Stop reason: SDUPTHER

## 2023-01-23 NOTE — TELEPHONE ENCOUNTER
Have we ever prescribed this med? Yes.  If yes, what date? Yes,12/19/22    Last OV: 10/4/22    Next OV: 04/4/23    DX: REM sleep behavior disorder (G47.52)    Medications: clonazePAM (KLONOPIN) 1 MG Tab

## 2023-02-14 ENCOUNTER — OUTPATIENT INFUSION SERVICES (OUTPATIENT)
Dept: ONCOLOGY | Facility: MEDICAL CENTER | Age: 78
End: 2023-02-14
Attending: STUDENT IN AN ORGANIZED HEALTH CARE EDUCATION/TRAINING PROGRAM
Payer: MEDICARE

## 2023-02-14 VITALS
WEIGHT: 137.13 LBS | SYSTOLIC BLOOD PRESSURE: 120 MMHG | BODY MASS INDEX: 22.04 KG/M2 | TEMPERATURE: 97 F | RESPIRATION RATE: 18 BRPM | HEART RATE: 88 BPM | HEIGHT: 66 IN | DIASTOLIC BLOOD PRESSURE: 80 MMHG | OXYGEN SATURATION: 98 %

## 2023-02-14 DIAGNOSIS — L40.50 PSORIATIC ARTHRITIS (HCC): ICD-10-CM

## 2023-02-14 PROCEDURE — 700111 HCHG RX REV CODE 636 W/ 250 OVERRIDE (IP): Mod: TB | Performed by: STUDENT IN AN ORGANIZED HEALTH CARE EDUCATION/TRAINING PROGRAM

## 2023-02-14 PROCEDURE — 96413 CHEMO IV INFUSION 1 HR: CPT

## 2023-02-14 PROCEDURE — 700105 HCHG RX REV CODE 258: Performed by: STUDENT IN AN ORGANIZED HEALTH CARE EDUCATION/TRAINING PROGRAM

## 2023-02-14 RX ORDER — SODIUM CHLORIDE 9 MG/ML
INJECTION, SOLUTION INTRAVENOUS CONTINUOUS
Status: CANCELLED | OUTPATIENT
Start: 2023-04-11

## 2023-02-14 RX ADMIN — INFLIXIMAB-AXXQ 600 MG: 100 INJECTION, POWDER, LYOPHILIZED, FOR SOLUTION INTRAVENOUS at 14:45

## 2023-02-14 ASSESSMENT — FIBROSIS 4 INDEX: FIB4 SCORE: 2.03

## 2023-02-14 ASSESSMENT — PAIN DESCRIPTION - PAIN TYPE: TYPE: CHRONIC PAIN

## 2023-02-15 NOTE — PROGRESS NOTES
Phuc presents to [facility] for q 8 week Avsola infusion for psoriatic arthritis.  Phuc denies any new or acute changes, denies s/s active infections or open wounds.  PIV placed, flushes easily with brisk blood return.  Avsola infused as order with filter in place over 1 hour.  Phuc tolerated well.  PIV flushed and removed, gauze and coban to site.  Discharged in NAD, next appointment confirmed.

## 2023-02-27 DIAGNOSIS — G47.52 REM SLEEP BEHAVIOR DISORDER: Chronic | ICD-10-CM

## 2023-02-27 RX ORDER — CLONAZEPAM 1 MG/1
1 TABLET ORAL NIGHTLY
Qty: 30 TABLET | Refills: 1 | Status: SHIPPED | OUTPATIENT
Start: 2023-02-27 | End: 2023-03-24 | Stop reason: SDUPTHER

## 2023-02-27 NOTE — TELEPHONE ENCOUNTER
Have we ever prescribed this med? Yes.  If yes, what date? 01/23/23    Last OV: 10/04/22    Next OV: 04/04/23    DX: REM sleep behavior disorder (G47.52)    Medications: clonazePAM (KLONOPIN) 1 MG Tab

## 2023-03-24 DIAGNOSIS — G47.52 REM SLEEP BEHAVIOR DISORDER: Chronic | ICD-10-CM

## 2023-03-27 RX ORDER — CLONAZEPAM 1 MG/1
1 TABLET ORAL NIGHTLY
Qty: 30 TABLET | Refills: 1 | Status: SHIPPED | OUTPATIENT
Start: 2023-03-27 | End: 2023-04-26

## 2023-03-27 NOTE — TELEPHONE ENCOUNTER
Have we ever prescribed this med? Yes.  If yes, what date?  2/27/2023    Last OV: 10/4/2022 - Dr. Hernandez     Next OV: 4/4/2023 - Dr. Hernandez    DX: REM sleep behavior disorder (G47.52    Medications: clonazePAM (KLONOPIN) 1 MG Tab

## 2023-04-04 ENCOUNTER — OFFICE VISIT (OUTPATIENT)
Dept: SLEEP MEDICINE | Facility: MEDICAL CENTER | Age: 78
End: 2023-04-04
Attending: STUDENT IN AN ORGANIZED HEALTH CARE EDUCATION/TRAINING PROGRAM
Payer: MEDICARE

## 2023-04-04 VITALS
SYSTOLIC BLOOD PRESSURE: 110 MMHG | RESPIRATION RATE: 14 BRPM | HEART RATE: 95 BPM | OXYGEN SATURATION: 97 % | HEIGHT: 67 IN | WEIGHT: 135 LBS | BODY MASS INDEX: 21.19 KG/M2 | DIASTOLIC BLOOD PRESSURE: 66 MMHG

## 2023-04-04 DIAGNOSIS — F51.04 CHRONIC INSOMNIA: ICD-10-CM

## 2023-04-04 DIAGNOSIS — G47.52 REM SLEEP BEHAVIOR DISORDER: Primary | ICD-10-CM

## 2023-04-04 PROCEDURE — 99213 OFFICE O/P EST LOW 20 MIN: CPT | Performed by: STUDENT IN AN ORGANIZED HEALTH CARE EDUCATION/TRAINING PROGRAM

## 2023-04-04 PROCEDURE — 99212 OFFICE O/P EST SF 10 MIN: CPT | Performed by: STUDENT IN AN ORGANIZED HEALTH CARE EDUCATION/TRAINING PROGRAM

## 2023-04-04 RX ORDER — CLONAZEPAM 0.5 MG/1
0.5 TABLET ORAL NIGHTLY PRN
Qty: 15 TABLET | Refills: 0 | Status: SHIPPED | OUTPATIENT
Start: 2023-04-04 | End: 2023-04-19

## 2023-04-04 ASSESSMENT — FIBROSIS 4 INDEX: FIB4 SCORE: 2.03

## 2023-04-04 NOTE — PROGRESS NOTES
[facility] Follow-up Visit    CC: Follow-up regarding management of REM behavior disorder and history of obstructive sleep apnea      HPI:  Phuc Mcdowell is a 77 y.o.male  with diabetes mellitus type 2, psoriatic arthritis, psoriasis, hyperlipidemia, history of sleep apnea and REM behavior disorder.  Presents to sleep clinic to follow-up regarding management of REM behavior disorder.    He is continue to take Klonopin 1 mg nightly.  He states that it does help him sleep and he feels that it does lessen the occurrence of behaviors and sleep.  However he is interested in trying to decrease the dose to see if a lower dose would still be effective.  He is continuing to take melatonin 10 mg nightly.  He states overall he feels that he is doing well with both medications.  The only thing he notices occasional lightheadedness which she is unsure if it is related to his medications.  With his current regimen he is having actions in his sleep about once a week related to his dreams.    He denies any snoring or gasping in sleep.  He states that his wife has not reported any pauses in breathing.  He states he will continue to consider repeating a sleep study if he feels it is needed.  At this time he would like to hold off on retesting for sleep apnea.    Sleep History  Last sleep study was done in September 2017 in [place] which showed REM without atonia.  Study at that time did not show obstructive sleep apnea with an overall AHI of 1.1.  Patient believes he did use CPAP up until the sleep study.    Patient Active Problem List    Diagnosis Date Noted    Erectile dysfunction 11/02/2022    Chronic pain of left elbow 07/26/2022    Dysuria 06/29/2022    Plaque psoriasis 04/19/2022    Long-term use of immunosuppressant medication 04/17/2022    Mixed hyperlipidemia 04/12/2022    Cerumen impaction 04/11/2022    REM sleep behavior disorder 04/11/2022    Psoriatic arthritis (HCC) 04/11/2022    Ingrown toenail of right  foot 2022    Type 2 diabetes mellitus without complication, without long-term current use of insulin (HCC) 2022       Past Medical History:   Diagnosis Date    Back pain     Back pain     Chickenpox     Diabetes (HCC)     Double vision     Frequent urination     Heartburn     Hyperlipidemia     Influenza     Mumps     Nasal drainage     Painful joint     Psoriatic arthritis (HCC)     Rash     REM sleep behavior disorder     Rheumatoid arthritis (HCC)     Sleep apnea     Sore muscles     Tonsillitis     Wears glasses         Past Surgical History:   Procedure Laterality Date    CATARACT EXTRACTION WITH IOL Bilateral     HI REMV 2ND CATARACT,CORN-SCLER SECTN         Family History   Problem Relation Age of Onset    Cancer Mother         lung and brain -  at 88    Heart Disease Father         passed at 62    Hypertension Sister     Hypertension Brother     Cancer Brother        Social History     Socioeconomic History    Marital status:      Spouse name: Not on file    Number of children: Not on file    Years of education: Not on file    Highest education level: Bachelor's degree (e.g., BA, AB, BS)   Occupational History    Not on file   Tobacco Use    Smoking status: Former    Smokeless tobacco: Never    Tobacco comments:     quit in    Vaping Use    Vaping Use: Never used   Substance and Sexual Activity    Alcohol use: Yes     Alcohol/week: 1.2 oz     Types: 2 Glasses of wine per week     Comment: occasional glass of wine    Drug use: Never    Sexual activity: Yes     Partners: Female     Birth control/protection: None   Other Topics Concern    Not on file   Social History Narrative    Not on file     Social Determinants of Health     Financial Resource Strain: Low Risk     Difficulty of Paying Living Expenses: Not very hard   Food Insecurity: No Food Insecurity    Worried About Running Out of Food in the Last Year: Never true    Ran Out of Food in the Last Year: Never true    Transportation Needs: No Transportation Needs    Lack of Transportation (Medical): No    Lack of Transportation (Non-Medical): No   Physical Activity: Sufficiently Active    Days of Exercise per Week: 3 days    Minutes of Exercise per Session: 60 min   Stress: No Stress Concern Present    Feeling of Stress : Not at all   Social Connections: Not on file   Intimate Partner Violence: Not on file   Housing Stability: Low Risk     Unable to Pay for Housing in the Last Year: No    Number of Places Lived in the Last Year: 2    Unstable Housing in the Last Year: No       Current Outpatient Medications   Medication Sig Dispense Refill    clonazePAM (KLONOPIN) 0.5 MG Tab Take 1 Tablet by mouth at bedtime as needed (for REM Behavior disorder) for up to 15 days. Indications: Disorder of Rapid Eye Movement Period of Sleep 15 Tablet 0    clonazePAM (KLONOPIN) 1 MG Tab Take 1 Tablet by mouth every evening for 30 days. 30 Tablet 1    tadalafil (CIALIS) 5 MG tablet Cialis 5 mg tablet   Take 1 tablet every day by oral route for 30 days.      Nirmatrelvir&Ritonavir 300/100 20 x 150 MG & 10 x 100MG Tablet Therapy Pack Take 300 mg nirmatrelvir (two 150 mg tablets) with 100 mg ritonavir (one 100 mg tablet) by mouth, with all three tablets taken together twice daily for 5 days. 30 Each 0    metformin (GLUCOPHAGE) 1000 MG tablet Take 1 Tablet by mouth 2 times a day with meals. 180 Tablet 3    pravastatin (PRAVACHOL) 20 MG Tab Take 1 Tablet by mouth every evening. 90 Tablet 3    glimepiride (AMARYL) 2 MG Tab Take 1 Tablet by mouth 2 (two) times a day. 90 Tablet 3    methotrexate 2.5 MG Tab Take 6 Tablets by mouth every 7 days. 77 Tablet 3    folic acid (FOLVITE) 1 MG Tab Take 1 Tablet by mouth every day. 90 Tablet 3    inFLIXimab (REMICADE) 100 MG Recon Soln Infuse 600 mg into a venous catheter every 8 weeks.      therapeutic multivitamin-minerals (THERAGRAN-M) Tab Take 1 Tablet by mouth every day.      omeprazole (PRILOSEC) 20 MG  "delayed-release capsule Take 1 Capsule by mouth every day.      aspirin (ASA) 81 MG Chew Tab chewable tablet Chew 1 Tablet every day.      Melatonin 10 MG Cap Take 20 mg by mouth every evening.      Acetaminophen (TYLENOL ARTHRITIS PAIN PO) Take 1,250 mg by mouth every day.       No current facility-administered medications for this visit.        ALLERGIES: Patient has no known allergies.    ROS  Constitutional: Denies fevers, Denies weight changes  Ears/Nose/Throat/Mouth: Denies nasal congestion or sore throat   Cardiovascular: Denies chest pain  Respiratory: Denies shortness of breath, Denies cough  Gastrointestinal/Hepatic: Denies nausea, vomiting  Sleep: see HPI      PHYSICAL EXAM  /66 (BP Location: Left arm, Patient Position: Sitting, BP Cuff Size: Large adult)   Pulse 95   Resp 14   Ht 1.702 m (5' 7\")   Wt 61.2 kg (135 lb)   SpO2 97%   BMI 21.14 kg/m²   Appearance: Well-nourished, well-developed, no acute distress  Eyes:  No scleral icterus , EOMI  ENMT: masked  Musculoskeletal:  Grossly normal; gait and station normal; digits and nails normal  Skin:  No rashes, petechiae, cyanosis  Neurologic: without focal signs; oriented to person, time, place, and purpose; judgement intact      Medical Decision Making   Assessment and Plan  Phuc Mcdowell is a 77 y.o.male  with diabetes mellitus type 2, psoriatic arthritis, psoriasis, hyperlipidemia, history of sleep apnea and REM behavior disorder.  Presents to sleep clinic to follow-up regarding management of REM behavior disorder.    The medical record was reviewed.    REM behavior disorder  Patient is currently noticing benefit from Klonopin 1 mg and melatonin 10 mg.  He is interested in decreasing dose.  Advised that he may cut down to 0.5 mg of Klonopin.  He may try this at night to see if this helps similarly to his 1 mg dosage.  Would advise to continue melatonin at 10 mg.  Reinforced bedroom precautions regarding potential objects which may harm him " or his wife.    Plan  -Prescribed 0.5mg of Klonopin  -Continue melatonin 10 mg  -If does well at 0.5 mg of Klonopin may continue at 0.5.  Otherwise may need to increase back to 1 mg  -Continue to implement bedroom precautions    Advised to reach out via [employer] if he decides for sleep apnea retesting.    Have advised the patient to follow up with the appropriate healthcare practitioners for all other medical problems and issues.    Return in about 6 months (around 10/4/2023).      Please note portions of this record was created using voice recognition software. I have made every reasonable attempt to correct obvious errors, but I expect that there are errors of grammar and possibly content I did not discover before finalizing the note.

## 2023-04-11 ENCOUNTER — OUTPATIENT INFUSION SERVICES (OUTPATIENT)
Dept: ONCOLOGY | Facility: MEDICAL CENTER | Age: 78
End: 2023-04-11
Attending: STUDENT IN AN ORGANIZED HEALTH CARE EDUCATION/TRAINING PROGRAM
Payer: MEDICARE

## 2023-04-11 VITALS
HEART RATE: 83 BPM | RESPIRATION RATE: 18 BRPM | HEIGHT: 66 IN | DIASTOLIC BLOOD PRESSURE: 66 MMHG | BODY MASS INDEX: 21.72 KG/M2 | WEIGHT: 135.14 LBS | SYSTOLIC BLOOD PRESSURE: 122 MMHG | TEMPERATURE: 98 F | OXYGEN SATURATION: 97 %

## 2023-04-11 DIAGNOSIS — L40.50 PSORIATIC ARTHRITIS (HCC): ICD-10-CM

## 2023-04-11 PROCEDURE — 96365 THER/PROPH/DIAG IV INF INIT: CPT

## 2023-04-11 PROCEDURE — 700105 HCHG RX REV CODE 258: Performed by: STUDENT IN AN ORGANIZED HEALTH CARE EDUCATION/TRAINING PROGRAM

## 2023-04-11 PROCEDURE — 96413 CHEMO IV INFUSION 1 HR: CPT

## 2023-04-11 PROCEDURE — 700111 HCHG RX REV CODE 636 W/ 250 OVERRIDE (IP): Mod: TB | Performed by: STUDENT IN AN ORGANIZED HEALTH CARE EDUCATION/TRAINING PROGRAM

## 2023-04-11 RX ORDER — SODIUM CHLORIDE 9 MG/ML
INJECTION, SOLUTION INTRAVENOUS CONTINUOUS
Status: CANCELLED | OUTPATIENT
Start: 2023-04-11

## 2023-04-11 RX ADMIN — INFLIXIMAB-AXXQ 600 MG: 100 INJECTION, POWDER, LYOPHILIZED, FOR SOLUTION INTRAVENOUS at 14:25

## 2023-04-11 ASSESSMENT — PAIN DESCRIPTION - PAIN TYPE: TYPE: CHRONIC PAIN

## 2023-04-11 ASSESSMENT — FIBROSIS 4 INDEX: FIB4 SCORE: 2.03

## 2023-04-11 NOTE — PROGRESS NOTES
Mr Mcdowell is here today for Avsola infusion. He has no new concerns to report. He denies any infections or live vaccines.     IV was placed to his right forearm. Flushed easily with good blood return.    Avsola was infused over one hour and was tolerated well.     IV was removed, gauze and coban applied to the site. Pt was discharged in stable condition.

## 2023-04-21 ENCOUNTER — PATIENT MESSAGE (OUTPATIENT)
Dept: SLEEP MEDICINE | Facility: MEDICAL CENTER | Age: 78
End: 2023-04-21
Payer: MEDICARE

## 2023-04-21 DIAGNOSIS — G47.52 REM SLEEP BEHAVIOR DISORDER: Primary | ICD-10-CM

## 2023-04-21 RX ORDER — CLONAZEPAM 0.5 MG/1
1 TABLET ORAL NIGHTLY PRN
Qty: 60 TABLET | Refills: 2 | Status: SHIPPED | OUTPATIENT
Start: 2023-04-21 | End: 2023-05-23 | Stop reason: SDUPTHER

## 2023-04-21 ASSESSMENT — RHEUMATOLOGY FOLLOW-UP QUESTIONNAIRE
RINGING IN EARS: Y
EAR PAIN: N
NASAL ULCERS: N
CAVITIES: N
PALPITATIONS: N
SINONASAL CONGESTION: Y
NAUSEA: N
DIZZINESS: N
SPASMS: N
ABNORMAL DISCHARGE: N
TEMPLE PAIN: N
SNORING: N
ABDOMINAL PAIN: N
CHIEF COMPLAINT: JOINT PAIN
CHEST PAIN WITH BREATHING: N
BLEEDING GUMS: N
HAIR LOSS WITH BALD SPOTS: Y
BLOOD IN URINE: N
HAIR SHEDDING: N
HEARING LOSS: N
HEARTBURN: Y
DRY COUGH: Y
SKIN PLAQUES: N
DIFFICULTY SPEAKING: N
BLOODY DIARRHEA: N
NECK PAIN: Y
VERTIGO: N
JOINT SWELLING: Y
DRY MOUTH: Y
THYROID PAIN: N
GOITER: N
VISION LOSS: N
DRY EYES: N
SINUS PAIN: Y
NOSEBLEEDS: N
NUMBNESS: N
JOINT PAIN: BETTER WITH ACTIVITY
DEPRESSION: N
HEADACHES: N
NIGHT SWEATS: N
VOMITING: N
FEVERS: N
PELVIC PAIN: N
MORNING STIFFNESS: 30-60 MINS
MUSCLE WEAKNESS: Y
MUSCLE PAIN: Y
ACHILLES TENDON PAIN: N
DIFFICULTY SWALLOWING: Y
GENITAL ULCERS: N
CHILLS: N
BLOODY CONSTIPATION: N
BURNING URINATION: N
BLURRINESS: N
MARK ALL THE AREAS OF PAIN: 84947781
SHORTNESS OF BREATH: N
SORE THROAT: Y
IRREGULAR BEATS: N
EYE REDNESS: N
ANXIETY: N
BODY ACHES: Y
FROTHY URINE: N
SKIN THICKENING: N
COUGH WITH BLOODY PHLEGM: N
ORAL ULCERS: N

## 2023-04-25 ENCOUNTER — OFFICE VISIT (OUTPATIENT)
Dept: RHEUMATOLOGY | Facility: MEDICAL CENTER | Age: 78
End: 2023-04-25
Attending: STUDENT IN AN ORGANIZED HEALTH CARE EDUCATION/TRAINING PROGRAM
Payer: MEDICARE

## 2023-04-25 VITALS
DIASTOLIC BLOOD PRESSURE: 74 MMHG | HEIGHT: 67 IN | TEMPERATURE: 96.9 F | HEART RATE: 90 BPM | WEIGHT: 138.38 LBS | BODY MASS INDEX: 21.72 KG/M2 | SYSTOLIC BLOOD PRESSURE: 122 MMHG | OXYGEN SATURATION: 95 %

## 2023-04-25 DIAGNOSIS — E11.9 TYPE 2 DIABETES MELLITUS WITHOUT COMPLICATION, WITHOUT LONG-TERM CURRENT USE OF INSULIN (HCC): ICD-10-CM

## 2023-04-25 DIAGNOSIS — D84.9 IMMUNOSUPPRESSED STATUS (HCC): ICD-10-CM

## 2023-04-25 DIAGNOSIS — L40.0 PLAQUE PSORIASIS: ICD-10-CM

## 2023-04-25 DIAGNOSIS — L40.50 PSORIATIC ARTHRITIS (HCC): ICD-10-CM

## 2023-04-25 PROBLEM — M19.012 PRIMARY OSTEOARTHRITIS OF BOTH SHOULDERS: Status: ACTIVE | Noted: 2023-04-25

## 2023-04-25 PROBLEM — M19.011 PRIMARY OSTEOARTHRITIS OF BOTH SHOULDERS: Status: ACTIVE | Noted: 2023-04-25

## 2023-04-25 PROCEDURE — 99212 OFFICE O/P EST SF 10 MIN: CPT | Performed by: STUDENT IN AN ORGANIZED HEALTH CARE EDUCATION/TRAINING PROGRAM

## 2023-04-25 PROCEDURE — 99214 OFFICE O/P EST MOD 30 MIN: CPT | Performed by: STUDENT IN AN ORGANIZED HEALTH CARE EDUCATION/TRAINING PROGRAM

## 2023-04-25 ASSESSMENT — FIBROSIS 4 INDEX: FIB4 SCORE: 2.03

## 2023-04-25 NOTE — PATIENT INSTRUCTIONS
AFTER VISIT INSTRUCTIONS    Below are important information to help you navigate your healthcare needs and help us serve you safely and effectively:  If laboratory tests and/or imaging studies were ordered, remember to go get them done as instructed.  If new prescriptions and/or refills were sent, remember to go pick them up from your local pharmacy, or call the specialty pharmacy to request shipment.  Always take your prescription medications exactly as prescribed unless instructed otherwise.  Note that antirheumatic drugs and steroids are immunosuppressive which means they increase your risk of infections and have multiple potential adverse effects on various organ systems in your body, though most of them are uncommon.  It is important that you are up-to-date on age-appropriate immunizations, particularly shingles and bacterial/viral pneumonia vaccines, which you can request from me or your primary care provider.  Be sure to read the drug package inserts to learn about the potential side effects of your medications before you start taking them.  If you experience any significant drug side effects, stop taking the medication and notify me promptly, and depending on the severity of the side effects, consider going to an urgent care or emergency department for immediate attention.  If there are significant findings on your lab tests and imaging studies that warrant further action, I will notify you with explanations via [employer] or phone call, otherwise you can view them on [employer] and let me know if you have any questions.  Note that [employer] messages are typically read during office hours and may take 1-7 business days before a response depending on the urgency of the situation and how busy my clinic schedule is.  In general, [employer] messaging is for non-urgent matters that do not require immediate attention, so for urgent matters that cannot wait, you are advised to go to an urgent care.  Lastly, you are granted  MyChart access to my documentation of your visit and are encouraged to read my note which details my assessment and plan for your condition.

## 2023-04-25 NOTE — PROGRESS NOTES
[facility] RHEUMATOLOGY  [address], [address], [place] [address]  Phone: [phone] ? Fax: [phone]    RHEUMATOLOGY FOLLOW-UP VISIT NOTE      DATE OF SERVICE: 04/25/2023         Subjective     PRIMARY CARE PRACTITIONER:  JONE Perales  [address]  [facility] [address]    PATIENT IDENTIFICATION:  Phuc Mcdowell  [address]  [facility] [address]    YOB: 1945    MEDICAL RECORD NUMBER: [id number]          CHIEF COMPLAINT:   Chief Complaint   Patient presents with    Follow-Up     Psoriatic arthritis (HCC)       RHEUMATOLOGIC HISTORY:  Phuc Mcdowell is a 77 y.o. male with pertinent history notable for psoriatic arthritis diagnosed in 2000 preceded by plaque psoriasis diagnosed many years prior, and osteoarthritis of shoulders among other comorbidities. Previously under the care of a rheumatologist in [facility] prior to relocating to [place], he initially presented on 4/19/2022 to establish care. Reported minimal joint pain in his hands (mostly DIP joints) toward the end of infliximab infusion cycle, but no new or worsening skin plaques. His joint pain was sometimes associated with 30 minutes of morning stiffness that improved with activity. Reported some weight loss due to changes he made in his diet and physical activity, but otherwise doing well.     Pertinent treatment history: Cyclosporine 100 mg daily (years-4/2022, deemed unnecessary), methotrexate 7.5>15mg weekly (started years ago, dose increased in 4/2022-present, effective), infliximab 600 mg IV every 8 weeks (years-present, effective).     Pertinent laboratory results: Negative/normal QTB and HBV (in 5/2022), eGFR, creatinine, and LFTs (in 7/2022), CRP and ESR (as of 10/2022).    Pertinent XR imaging results: Pelvis with bilateral mild SI joint arthritis with partial osseous fusion (in 11/2021). Shoulders with mild osteoarthritis of bilateral AC joints and left GH joint (in 11/2021). Left  elbow with no evidence of arthropathy (in 7/2022).      INTERVAL HISTORY:  [facility] Established Patient History Form    4/21/2023  3:54 PM PDT - Filed by Patient   Chief Complaint joint pain   Interval History of Present Condition   Date of worsening symptom onset:    Preceding incident/ailment:    Describe/list your symptoms: Occasional minimal plaques on back of neck and hands   Aggravating factors:    Alleviating factors:    Helpful medications: Infliximab infusion   Ineffective medications:    Symptom severity/impact (scale of 1-10):    Personal/emotional stressors:    [place] All The Locations Of Pain    REVIEW OF SYSTEMS    General   Fevers No   Chills No   Night sweats No   Unintentional Weight Loss None   Musculoskeletal   Joint pain Better with activity   Morning stiffness 30-60 mins   Joint swelling Yes   Achilles tendon pain No   Muscle pain Yes   Body Aches Yes   Dermatologic   Hair loss with bald spots Yes   Hair shedding No   Sunlight-induced skin rash    Skin thickening No   Skin plaques No   Cold-induced color changes (white, purple, red on rewarming)    Neurologic/Psychiatric   Muscle weakness Yes   Spasms No   Tingling    Numbness No   Anxiety No   Depression No   Head/Eyes   Headaches No   Temple pain No   Dizziness No   Dry eyes No   Eye pain    Eye redness No   Blurriness No   Vision loss No   Ears/Nose   Ear pain No   Ringing in ears Yes   Vertigo No   Hearing loss No   Nasal ulcers No   Nosebleeds No   Sinus pain Yes   Sinonasal congestion Yes   Snoring No   Mouth/Throat   Oral ulcers No   Bleeding gums No   Dry mouth Yes   Cavities No   Sore throat Yes   Difficulty speaking No   Difficulty swallowing Yes   Neck/Lymphatics   Neck pain Yes   Thyroid pain No   Goiter No   Swollen Glands    Cardiac/Respiratory   Chest pain with breathing No   Dry cough Yes   Cough with bloody phlegm No   Shortness of breath No   Palpitations No   Irregular beats No   Gastrointestinal   Nausea No   Vomiting  No   Heartburn Yes   Abdominal pain No   Bloody diarrhea No   Bloody constipation No   Genitourinary   Pelvic Pain No   Genital ulcers No   Abnormal discharge No   Burning urination No   Frothy urine No   Blood in urine No   Supplemental Information   Notable Life Changes/Adjustments Since Last Visit        ACTIVE PROBLEM LIST:  Patient Active Problem List    Diagnosis Date Noted    Immunosuppressed status ([facility]) 04/25/2023    Primary osteoarthritis of both shoulders 04/25/2023    Erectile dysfunction 11/02/2022    Chronic pain of left elbow 07/26/2022    Dysuria 06/29/2022    Plaque psoriasis 04/19/2022    Long-term use of immunosuppressant medication 04/17/2022    Mixed hyperlipidemia 04/12/2022    Cerumen impaction 04/11/2022    REM sleep behavior disorder 04/11/2022    Psoriatic arthritis ([facility]) 04/11/2022    Ingrown toenail of right foot 04/11/2022    Type 2 diabetes mellitus without complication, without long-term current use of insulin ([facility]) 04/11/2022       PAST MEDICAL HISTORY:  Past Medical History:   Diagnosis Date    Back pain     Back pain     Chickenpox     Diabetes ([facility])     Double vision     Frequent urination     Heartburn     Hyperlipidemia     Influenza     Mumps     Nasal drainage     Painful joint     Psoriatic arthritis ([facility])     Rash     REM sleep behavior disorder     Rheumatoid arthritis ([facility])     Sleep apnea     Sore muscles     Tonsillitis     Wears glasses        PAST SURGICAL HISTORY:  Past Surgical History:   Procedure Laterality Date    CATARACT EXTRACTION WITH IOL Bilateral 2016    TX REMV 2ND CATARACT,CORN-SCLER SECTN         MEDICATIONS:  Current Outpatient Medications   Medication Sig    clonazePAM (KLONOPIN) 0.5 MG Tab Take 2 Tablets by mouth at bedtime as needed (30 min before bedtime as needed) for up to 30 days. Indications: Disorder of Rapid Eye Movement Period of Sleep    tadalafil (CIALIS) 5 MG tablet Cialis 5 mg tablet   Take 1 tablet every day by oral route for 30 days.     Nirmatrelvir&Ritonavir 300/100 20 x 150 MG & 10 x 100MG Tablet Therapy Pack Take 300 mg nirmatrelvir (two 150 mg tablets) with 100 mg ritonavir (one 100 mg tablet) by mouth, with all three tablets taken together twice daily for 5 days.    metformin (GLUCOPHAGE) 1000 MG tablet Take 1 Tablet by mouth 2 times a day with meals.    pravastatin (PRAVACHOL) 20 MG Tab Take 1 Tablet by mouth every evening.    glimepiride (AMARYL) 2 MG Tab Take 1 Tablet by mouth 2 (two) times a day.    methotrexate 2.5 MG Tab Take 6 Tablets by mouth every 7 days.    folic acid (FOLVITE) 1 MG Tab Take 1 Tablet by mouth every day.    inFLIXimab (REMICADE) 100 MG Recon Soln Infuse 600 mg into a venous catheter every 8 weeks.    therapeutic multivitamin-minerals (THERAGRAN-M) Tab Take 1 Tablet by mouth every day.    omeprazole (PRILOSEC) 20 MG delayed-release capsule Take 1 Capsule by mouth every day.    aspirin (ASA) 81 MG Chew Tab chewable tablet Chew 1 Tablet every day.    Melatonin 10 MG Cap Take 20 mg by mouth every evening.    Acetaminophen (TYLENOL ARTHRITIS PAIN PO) Take 1,250 mg by mouth every day.    clonazePAM (KLONOPIN) 1 MG Tab Take 1 Tablet by mouth every evening for 30 days.       ALLERGIES:   No Known Allergies    IMMUNIZATIONS:  Immunization History   Administered Date(s) Administered    Influenza Vaccine Adult HD 10/04/2022    MODERNA SARS-COV-2 VACCINE (12+) 01/30/2021, 03/03/2021, 08/20/2021, 10/04/2022       SOCIAL HISTORY:   Social History     Socioeconomic History    Marital status:     Highest education level: Bachelor's degree (e.g., BA, AB, BS)   Tobacco Use    Smoking status: Former    Smokeless tobacco: Never    Tobacco comments:     quit in 1994   Vaping Use    Vaping Use: Never used   Substance and Sexual Activity    Alcohol use: Yes     Alcohol/week: 1.2 oz     Types: 2 Glasses of wine per week     Comment: occasional glass of wine    Drug use: Never    Sexual activity: Yes     Partners: Female     Birth  "control/protection: None     Social Determinants of Health     Financial Resource Strain: Low Risk     Difficulty of Paying Living Expenses: Not very hard   Food Insecurity: No Food Insecurity    Worried About Running Out of Food in the Last Year: Never true    Ran Out of Food in the Last Year: Never true   Transportation Needs: No Transportation Needs    Lack of Transportation (Medical): No    Lack of Transportation (Non-Medical): No   Physical Activity: Sufficiently Active    Days of Exercise per Week: 3 days    Minutes of Exercise per Session: 60 min   Stress: No Stress Concern Present    Feeling of Stress : Not at all   Housing Stability: Low Risk     Unable to Pay for Housing in the Last Year: No    Number of Places Lived in the Last Year: 2    Unstable Housing in the Last Year: No       FAMILY HISTORY:  Family History   Problem Relation Age of Onset    Cancer Mother         lung and brain -  at 88    Heart Disease Father         passed at 62    Hypertension Sister     Hypertension Brother     Cancer Brother             Objective     Vital Signs: /74 (BP Location: Left arm, Patient Position: Sitting, BP Cuff Size: Adult)   Pulse 90   Temp 36.1 °C (96.9 °F) (Temporal)   Ht 1.702 m (5' 7\")   Wt 62.8 kg (138 lb 6 oz)   SpO2 95% Body mass index is 21.67 kg/m².    General: Appears well and comfortable  Eyes: No scleral or conjunctival lesions  ENT: No apparent oral or nasal lesions  Head/Neck: No apparent scalp or neck lesions  Cardiovascular: Regular rate and rhythm  Respiratory: Breathing quiet and unlabored  Gastrointestinal: No apparent organomegaly or abdominal masses  Integumentary: No significant cutaneous lesions or discolorations  Musculoskeletal: No significant joint tenderness, periarticular soft tissue swelling, warmth, erythema, or overt signs of synovitis; no significant restriction in range of motion of joints examined  Neurologic: No focal sensory or motor deficits  Psychiatric: Mood " and affect appropriate      LABORATORY RESULTS REVIEWED AND INTERPRETED BY ME:  Lab Results   Component Value Date/Time    SEDRATEWES 17 10/25/2022 11:16 AM    CREACTPROT 0.56 07/29/2022 08:17 AM     Lab Results   Component Value Date/Time    WBC 6.4 10/25/2022 11:16 AM    RBC 4.31 (L) 10/25/2022 11:16 AM    HEMOGLOBIN 13.3 (L) 10/25/2022 11:16 AM    HEMATOCRIT 40.4 (L) 10/25/2022 11:16 AM    MCV 93.7 10/25/2022 11:16 AM    MCH 30.9 10/25/2022 11:16 AM    MCHC 32.9 (L) 10/25/2022 11:16 AM    RDW 49.9 10/25/2022 11:16 AM    PLATELETCT 218 10/25/2022 11:16 AM    MPV 9.8 10/25/2022 11:16 AM    NEUTS 3.24 10/25/2022 11:16 AM    LYMPHOCYTES 37.30 10/25/2022 11:16 AM    MONOCYTES 9.10 10/25/2022 11:16 AM    EOSINOPHILS 2.20 10/25/2022 11:16 AM    BASOPHILS 0.30 10/25/2022 11:16 AM     Lab Results   Component Value Date/Time    ASTSGOT 25 07/29/2022 08:17 AM    ALTSGPT 18 07/29/2022 08:17 AM    ALKPHOSPHAT 74 07/29/2022 08:17 AM    TBILIRUBIN 0.2 07/29/2022 08:17 AM    TOTPROTEIN 7.6 07/29/2022 08:17 AM    ALBUMIN 4.1 07/29/2022 08:17 AM     Lab Results   Component Value Date/Time    SODIUM 136 07/29/2022 08:17 AM    POTASSIUM 4.7 07/29/2022 08:17 AM    CHLORIDE 102 07/29/2022 08:17 AM    CO2 23 07/29/2022 08:17 AM    GLUCOSE 144 (H) 07/29/2022 08:17 AM    BUN 16 07/29/2022 08:17 AM    CREATININE 1.11 07/29/2022 08:17 AM    CALCIUM 9.7 07/29/2022 08:17 AM     Lab Results   Component Value Date/Time    MICROALBUR 2.1 11/02/2022 11:15 AM    MALBCRT 85 (H) 11/02/2022 11:15 AM     Lab Results   Component Value Date/Time    HEPBSAG Non-Reactive 05/06/2022 02:18 PM    HEPBCORTOT NonReactive 05/06/2022 02:18 PM     Lab Results   Component Value Date/Time    CHOLSTRLTOT 140 04/15/2022 08:15 AM    LDL 72 04/15/2022 08:15 AM    HDL 50 04/15/2022 08:15 AM    TRIGLYCERIDE 91 04/15/2022 08:15 AM    HBA1C 7.6 (A) 11/02/2022 01:12 AM       RADIOLOGY RESULTS REVIEWED AND INTERPRETED BY ME:    Results for orders placed in visit on  04/12/22    DX-SHOULDER 2+    Results for orders placed in visit on 04/12/22    DX-PELVIS-1 OR 2 VIEWS      All relevant laboratory and imaging results reported on this note were reviewed and interpreted by me.         Assessment & Plan     Phuc Mcdowell is a 77 y.o. male with history as noted above whose presentation merits the following diagnostic and clinical status impressions and recommendations:    1. Psoriatic arthritis (HCC)  Clinically quiescent disease that remains well-controlled on the current regimen of methotrexate and infliximab, so reasonable to consider de-escalation of immunosuppression by decreasing the dose of infliximab infusion when due for renewal. In any case, given the potential for discordance between immunologic activity and clinical disease manifestations, need to routinely assess markers of disease activity to gauge overall trajectory in response to treatment.  - Sed Rate; Future  - CRP QUANTITIVE (NON-CARDIAC); Future  - Continue methotrexate 15 mg oral weekly with folic acid 1 mg daily  - Continue infliximab 600 mg IV infusion every 8 weeks  - Plan to decrease infliximab 600 > 300 mg when due for renewal    2. Plaque psoriasis  Well-controlled on the current regimen as this appears to track with his psoriatic arthritis.  - Continue methotrexate 15 mg oral weekly with folic acid 1 mg daily  - Continue infliximab 600 mg IV infusion every 8 weeks     3. Immunosuppressed status (HCC)  Presently with no reported history, physical exam, or laboratory evidence to suggest significant adverse drug effects or opportunistic infections, but need routine monitoring per guidelines.  - CBC WITH DIFFERENTIAL; Future  - Comp Metabolic Panel; Future  - HEMOGLOBIN A1C; Future (requested by patient as he is trying to find another PCP)  - Need to ascertain age-appropriate vaccines      Note: The above assessment and plan were discussed with the patient who acknowledged understanding of the  plan.    FOLLOW-UP: Return in about 6 months (around 10/25/2023) for Short.         Thank you for the opportunity to participate in the care of Phuc Benjaminp.    Piero Lewis MD, MS  Rheumatologist, [facility] Rheumatology ? [facility]   of Clinical Medicine, Department of Internal Medicine  [facility]iatric hospital ? [facility]

## 2023-04-26 ENCOUNTER — HOSPITAL ENCOUNTER (OUTPATIENT)
Dept: LAB | Facility: MEDICAL CENTER | Age: 78
End: 2023-04-26
Attending: STUDENT IN AN ORGANIZED HEALTH CARE EDUCATION/TRAINING PROGRAM
Payer: MEDICARE

## 2023-04-26 ENCOUNTER — HOSPITAL ENCOUNTER (OUTPATIENT)
Dept: LAB | Facility: MEDICAL CENTER | Age: 78
End: 2023-04-26
Attending: PHYSICIAN ASSISTANT
Payer: MEDICARE

## 2023-04-26 DIAGNOSIS — L40.50 PSORIATIC ARTHRITIS (HCC): ICD-10-CM

## 2023-04-26 DIAGNOSIS — D84.9 IMMUNOSUPPRESSED STATUS (HCC): ICD-10-CM

## 2023-04-26 LAB
ALBUMIN SERPL BCP-MCNC: 4.5 G/DL (ref 3.2–4.9)
ALBUMIN/GLOB SERPL: 1.3 G/DL
ALP SERPL-CCNC: 63 U/L (ref 30–99)
ALT SERPL-CCNC: 22 U/L (ref 2–50)
ANION GAP SERPL CALC-SCNC: 12 MMOL/L (ref 7–16)
AST SERPL-CCNC: 24 U/L (ref 12–45)
BASOPHILS # BLD AUTO: 0.5 % (ref 0–1.8)
BASOPHILS # BLD: 0.03 K/UL (ref 0–0.12)
BILIRUB SERPL-MCNC: 0.7 MG/DL (ref 0.1–1.5)
BUN SERPL-MCNC: 24 MG/DL (ref 8–22)
CALCIUM ALBUM COR SERPL-MCNC: 10 MG/DL (ref 8.5–10.5)
CALCIUM SERPL-MCNC: 10.4 MG/DL (ref 8.5–10.5)
CHLORIDE SERPL-SCNC: 103 MMOL/L (ref 96–112)
CO2 SERPL-SCNC: 24 MMOL/L (ref 20–33)
CREAT SERPL-MCNC: 1.2 MG/DL (ref 0.5–1.4)
CRP SERPL HS-MCNC: <0.3 MG/DL (ref 0–0.75)
EOSINOPHIL # BLD AUTO: 0.25 K/UL (ref 0–0.51)
EOSINOPHIL NFR BLD: 4.1 % (ref 0–6.9)
ERYTHROCYTE [DISTWIDTH] IN BLOOD BY AUTOMATED COUNT: 50.5 FL (ref 35.9–50)
ERYTHROCYTE [SEDIMENTATION RATE] IN BLOOD BY WESTERGREN METHOD: 7 MM/HOUR (ref 0–20)
EST. AVERAGE GLUCOSE BLD GHB EST-MCNC: 148 MG/DL
FASTING STATUS PATIENT QL REPORTED: NORMAL
GFR SERPLBLD CREATININE-BSD FMLA CKD-EPI: 63 ML/MIN/1.73 M 2
GLOBULIN SER CALC-MCNC: 3.4 G/DL (ref 1.9–3.5)
GLUCOSE SERPL-MCNC: 174 MG/DL (ref 65–99)
HBA1C MFR BLD: 6.8 % (ref 4–5.6)
HCT VFR BLD AUTO: 47.1 % (ref 42–52)
HGB BLD-MCNC: 15 G/DL (ref 14–18)
IMM GRANULOCYTES # BLD AUTO: 0.01 K/UL (ref 0–0.11)
IMM GRANULOCYTES NFR BLD AUTO: 0.2 % (ref 0–0.9)
LYMPHOCYTES # BLD AUTO: 3.01 K/UL (ref 1–4.8)
LYMPHOCYTES NFR BLD: 48.8 % (ref 22–41)
MCH RBC QN AUTO: 30.5 PG (ref 27–33)
MCHC RBC AUTO-ENTMCNC: 31.8 G/DL (ref 33.7–35.3)
MCV RBC AUTO: 95.7 FL (ref 81.4–97.8)
MONOCYTES # BLD AUTO: 0.45 K/UL (ref 0–0.85)
MONOCYTES NFR BLD AUTO: 7.3 % (ref 0–13.4)
NEUTROPHILS # BLD AUTO: 2.42 K/UL (ref 1.82–7.42)
NEUTROPHILS NFR BLD: 39.1 % (ref 44–72)
NRBC # BLD AUTO: 0 K/UL
NRBC BLD-RTO: 0 /100 WBC
PLATELET # BLD AUTO: 216 K/UL (ref 164–446)
PMV BLD AUTO: 10.1 FL (ref 9–12.9)
POTASSIUM SERPL-SCNC: 5.4 MMOL/L (ref 3.6–5.5)
PROT SERPL-MCNC: 7.9 G/DL (ref 6–8.2)
PSA SERPL-MCNC: 2.26 NG/ML (ref 0–4)
RBC # BLD AUTO: 4.92 M/UL (ref 4.7–6.1)
SODIUM SERPL-SCNC: 139 MMOL/L (ref 135–145)
WBC # BLD AUTO: 6.2 K/UL (ref 4.8–10.8)

## 2023-04-26 PROCEDURE — 36415 COLL VENOUS BLD VENIPUNCTURE: CPT

## 2023-04-26 PROCEDURE — 85652 RBC SED RATE AUTOMATED: CPT

## 2023-04-26 PROCEDURE — 80053 COMPREHEN METABOLIC PANEL: CPT

## 2023-04-26 PROCEDURE — 86140 C-REACTIVE PROTEIN: CPT

## 2023-04-26 PROCEDURE — 85025 COMPLETE CBC W/AUTO DIFF WBC: CPT

## 2023-04-26 PROCEDURE — 84153 ASSAY OF PSA TOTAL: CPT | Mod: GA

## 2023-04-26 PROCEDURE — 83036 HEMOGLOBIN GLYCOSYLATED A1C: CPT | Mod: GA

## 2023-04-26 RX ORDER — GLIMEPIRIDE 2 MG/1
TABLET ORAL
Qty: 90 TABLET | Refills: 0 | Status: SHIPPED
Start: 2023-04-26 | End: 2023-05-02

## 2023-05-02 ENCOUNTER — OFFICE VISIT (OUTPATIENT)
Dept: MEDICAL GROUP | Facility: PHYSICIAN GROUP | Age: 78
End: 2023-05-02
Payer: MEDICARE

## 2023-05-02 VITALS
RESPIRATION RATE: 18 BRPM | HEART RATE: 97 BPM | OXYGEN SATURATION: 98 % | DIASTOLIC BLOOD PRESSURE: 62 MMHG | SYSTOLIC BLOOD PRESSURE: 118 MMHG | TEMPERATURE: 97.2 F | BODY MASS INDEX: 21.58 KG/M2 | WEIGHT: 137.5 LBS | HEIGHT: 67 IN

## 2023-05-02 DIAGNOSIS — L40.50 PSORIATIC ARTHRITIS (HCC): ICD-10-CM

## 2023-05-02 DIAGNOSIS — E78.5 DYSLIPIDEMIA DUE TO TYPE 2 DIABETES MELLITUS (HCC): ICD-10-CM

## 2023-05-02 DIAGNOSIS — Z13.6 ENCOUNTER FOR ABDOMINAL AORTIC ANEURYSM (AAA) SCREENING: ICD-10-CM

## 2023-05-02 DIAGNOSIS — E78.5 TYPE 2 DIABETES MELLITUS WITH HYPERLIPIDEMIA (HCC): ICD-10-CM

## 2023-05-02 DIAGNOSIS — N52.9 ERECTILE DYSFUNCTION, UNSPECIFIED ERECTILE DYSFUNCTION TYPE: ICD-10-CM

## 2023-05-02 DIAGNOSIS — D84.9 IMMUNOSUPPRESSED STATUS (HCC): Chronic | ICD-10-CM

## 2023-05-02 DIAGNOSIS — G47.52 REM SLEEP BEHAVIOR DISORDER: Chronic | ICD-10-CM

## 2023-05-02 DIAGNOSIS — E11.69 TYPE 2 DIABETES MELLITUS WITH HYPERLIPIDEMIA (HCC): ICD-10-CM

## 2023-05-02 DIAGNOSIS — K21.9 GASTROESOPHAGEAL REFLUX DISEASE WITHOUT ESOPHAGITIS: ICD-10-CM

## 2023-05-02 DIAGNOSIS — E11.69 DYSLIPIDEMIA DUE TO TYPE 2 DIABETES MELLITUS (HCC): ICD-10-CM

## 2023-05-02 PROBLEM — L60.0 INGROWN TOENAIL OF RIGHT FOOT: Status: RESOLVED | Noted: 2022-04-11 | Resolved: 2023-05-02

## 2023-05-02 PROCEDURE — 99215 OFFICE O/P EST HI 40 MIN: CPT | Performed by: INTERNAL MEDICINE

## 2023-05-02 RX ORDER — OMEPRAZOLE 20 MG/1
20 CAPSULE, DELAYED RELEASE ORAL DAILY
Qty: 90 CAPSULE | Refills: 3 | Status: SHIPPED | OUTPATIENT
Start: 2023-05-02

## 2023-05-02 RX ORDER — GLIMEPIRIDE 2 MG/1
2 TABLET ORAL 2 TIMES DAILY
Qty: 180 TABLET | Refills: 3 | Status: SHIPPED | OUTPATIENT
Start: 2023-05-02

## 2023-05-02 RX ORDER — SILDENAFIL CITRATE 20 MG/1
20 TABLET ORAL 3 TIMES DAILY
COMMUNITY
End: 2023-09-05

## 2023-05-02 ASSESSMENT — FIBROSIS 4 INDEX: FIB4 SCORE: 1.776672636296753619

## 2023-05-02 ASSESSMENT — PATIENT HEALTH QUESTIONNAIRE - PHQ9: CLINICAL INTERPRETATION OF PHQ2 SCORE: 0

## 2023-05-02 NOTE — ASSESSMENT & PLAN NOTE
Chronic ongoing condition.  The patient is taking glimepiride 2 mg twice daily and metformin 1000 mg twice daily.  Recent A1c 6.8%.  The patient denies significant hypoglycemic symptoms.

## 2023-05-02 NOTE — ASSESSMENT & PLAN NOTE
Chronic condition.  Patient is taking pravastatin 20 Mg daily.  No significant side effects reported.  Lab test ordered for follow-up.

## 2023-05-02 NOTE — PROGRESS NOTES
PRIMARY CARE CLINIC VISIT    Chief complaint:    New patient here to establish care  Psoriatic arthritis  Follow-up diabetes  REM sleep behavior disorder  Erectile dysfunction  Acid reflux  Follow-up hyperlipidemia    History of Present Illness     Psoriatic arthritis (HCC)  Chronic condition.  The patient currently being treated with Remicade 600 mg every 8 weeks.  Methotrexate 50 mg every 7 days.  Patient tolerating medication well.  No significant side effects reported.  Patient reported that symptoms are fairly well controlled at this time.    Type 2 diabetes mellitus with hyperlipidemia (HCC)  Chronic ongoing condition.  The patient is taking glimepiride 2 mg twice daily and metformin 1000 mg twice daily.  Recent A1c 6.8%.  The patient denies significant hypoglycemic symptoms.    REM sleep behavior disorder  Chronic ongoing condition.  Patient followed by sleep medicine specialist.  Patient is presently taking melatonin Mg nightly and Klonopin 1 mg as needed.  Patient denies significant side effects.    Erectile dysfunction  Chronic ongoing condition.  The patient followed by urology.  Patient is taking sildenafil 20 mg 3 times a day.  No significant side effects noted.    Immunosuppressed status (HCC)  Chronic condition for the patient taking Remicade.  Patient denies fever chills chest pain shortness of breath or any other symptoms.    Gastroesophageal reflux disease without esophagitis  Chronic ongoing condition.  The patient is taking omeprazole 20 Mg daily.  Patient denies nausea vomiting dysphagia or unexplained weight loss.  The patient request prescription refills.    Dyslipidemia due to type 2 diabetes mellitus (HCC)  Chronic condition.  Patient is taking pravastatin 20 Mg daily.  No significant side effects reported.  Lab test ordered for follow-up.    Current Outpatient Medications on File Prior to Visit   Medication Sig Dispense Refill    sildenafil (REVATIO) 20 MG tablet Take 20 mg by mouth 3 times  a day.      clonazePAM (KLONOPIN) 0.5 MG Tab Take 2 Tablets by mouth at bedtime as needed (30 min before bedtime as needed) for up to 30 days. Indications: Disorder of Rapid Eye Movement Period of Sleep 60 Tablet 2    metformin (GLUCOPHAGE) 1000 MG tablet Take 1 Tablet by mouth 2 times a day with meals. 180 Tablet 3    pravastatin (PRAVACHOL) 20 MG Tab Take 1 Tablet by mouth every evening. 90 Tablet 3    methotrexate 2.5 MG Tab Take 6 Tablets by mouth every 7 days. 77 Tablet 3    inFLIXimab (REMICADE) 100 MG Recon Soln Infuse 600 mg into a venous catheter every 8 weeks.      therapeutic multivitamin-minerals (THERAGRAN-M) Tab Take 1 Tablet by mouth every day.      Melatonin 10 MG Cap Take 20 mg by mouth every evening.      folic acid (FOLVITE) 1 MG Tab Take 1 Tablet by mouth every day. 90 Tablet 3    aspirin (ASA) 81 MG Chew Tab chewable tablet Chew 1 Tablet every day.      Acetaminophen (TYLENOL ARTHRITIS PAIN PO) Take 1,250 mg by mouth every day.       No current facility-administered medications on file prior to visit.        Allergies: Patient has no known allergies.    Current Outpatient Medications Ordered in Epic   Medication Sig Dispense Refill    sildenafil (REVATIO) 20 MG tablet Take 20 mg by mouth 3 times a day.      glimepiride (AMARYL) 2 MG Tab Take 1 Tablet by mouth 2 times a day. 180 Tablet 3    omeprazole (PRILOSEC) 20 MG delayed-release capsule Take 1 Capsule by mouth every day. 90 Capsule 3    clonazePAM (KLONOPIN) 0.5 MG Tab Take 2 Tablets by mouth at bedtime as needed (30 min before bedtime as needed) for up to 30 days. Indications: Disorder of Rapid Eye Movement Period of Sleep 60 Tablet 2    metformin (GLUCOPHAGE) 1000 MG tablet Take 1 Tablet by mouth 2 times a day with meals. 180 Tablet 3    pravastatin (PRAVACHOL) 20 MG Tab Take 1 Tablet by mouth every evening. 90 Tablet 3    methotrexate 2.5 MG Tab Take 6 Tablets by mouth every 7 days. 77 Tablet 3    inFLIXimab (REMICADE) 100 MG Recon Soln  "Infuse 600 mg into a venous catheter every 8 weeks.      therapeutic multivitamin-minerals (THERAGRAN-M) Tab Take 1 Tablet by mouth every day.      Melatonin 10 MG Cap Take 20 mg by mouth every evening.      folic acid (FOLVITE) 1 MG Tab Take 1 Tablet by mouth every day. 90 Tablet 3    aspirin (ASA) 81 MG Chew Tab chewable tablet Chew 1 Tablet every day.      Acetaminophen (TYLENOL ARTHRITIS PAIN PO) Take 1,250 mg by mouth every day.       No current [facility]-ordered facility-administered medications on file.       Past Medical History:   Diagnosis Date    Back pain     Back pain     Chickenpox     Diabetes (HCC)     Double vision     Frequent urination     Heartburn     Hyperlipidemia     Influenza     Mumps     Nasal drainage     Painful joint     Psoriatic arthritis (HCC)     Rash     REM sleep behavior disorder     Rheumatoid arthritis (HCC)     Sleep apnea     Sore muscles     Tonsillitis     Wears glasses        Past Surgical History:   Procedure Laterality Date    CATARACT EXTRACTION WITH IOL Bilateral     [place] REMV 2ND CATARACT,CORN-SCLER SECTN         Family History   Problem Relation Age of Onset    Cancer Mother         lung and brain -  at 88    Heart Disease Father         passed at 62    Hypertension Sister     Hypertension Brother     Cancer Brother        Social History     Tobacco Use   Smoking Status Former   Smokeless Tobacco Never   Tobacco Comments    quit in        Social History     Substance and Sexual Activity   Alcohol Use Yes    Alcohol/week: 1.2 oz    Types: 2 Glasses of wine per week    Comment: occasional glass of wine       Review of systems.  As per HPI above. All other systems reviewed and negative.      Past Medical, Social, and Family history reviewed and updated in EPIC     Objective     /62 (BP Location: Left arm, Patient Position: Sitting, BP Cuff Size: Adult)   Pulse 97   Temp 36.2 °C (97.2 °F) (Temporal)   Resp 18   Ht 1.702 m (5' 7\")   Wt 62.4 kg (137 lb 8 " oz)   SpO2 98%    Body mass index is 21.54 kg/m².    General: alert in no apparent distress.  Cardiovascular: regular rate and rhythm  Pulmonary: lungs : no wheezing   Gastrointestinal: BS present. No obvious mass noted        Lab Results   Component Value Date/Time    HBA1C 6.8 (H) 04/26/2023 08:34 AM    HBA1C 7.6 (A) 11/02/2022 01:12 AM    HBA1C 6.9 (H) 04/15/2022 08:15 AM       Lab Results   Component Value Date/Time    WBC 6.2 04/26/2023 08:34 AM    HEMOGLOBIN 15.0 04/26/2023 08:34 AM    HEMATOCRIT 47.1 04/26/2023 08:34 AM    MCV 95.7 04/26/2023 08:34 AM    PLATELETCT 216 04/26/2023 08:34 AM         Lab Results   Component Value Date/Time    SODIUM 139 04/26/2023 08:34 AM    POTASSIUM 5.4 04/26/2023 08:34 AM    GLUCOSE 174 (H) 04/26/2023 08:34 AM    BUN 24 (H) 04/26/2023 08:34 AM    CREATININE 1.20 04/26/2023 08:34 AM       Lab Results   Component Value Date/Time    CHOLSTRLTOT 140 04/15/2022 08:15 AM    LDL 72 04/15/2022 08:15 AM    HDL 50 04/15/2022 08:15 AM    TRIGLYCERIDE 91 04/15/2022 08:15 AM       Lab Results   Component Value Date/Time    ALTSGPT 22 04/26/2023 08:34 AM             Assessment and Plan     1. Type 2 diabetes mellitus with hyperlipidemia (HCC)  - glimepiride (AMARYL) 2 MG Tab; Take 1 Tablet by mouth 2 times a day.  Dispense: 180 Tablet; Refill: 3  - HEMOGLOBIN A1C; Future  - MICROALBUMIN CREAT RATIO URINE; Future  Chronic stable condition.  Patient doing well with current regimen of glimepiride 2 mg twice daily and metformin 1000 mg twice daily.  A1c goal of 7% discussed.  Basic physiology of Diabetes was explained to patient as well as possible microvascular/macrovascular complications.  Pt's education:   The importance of increasing physical activity to improve diabetes control was discussed with the patient.   Patient was also educated on changing diet and making better choices to help control blood sugar.   Discussed FBG goal of 100-120, 2hr PP <180       2. Psoriatic arthritis  (HCC)  Chronic stable condition.  Continue with methotrexate 15 Mg every 7 days and Remicade 600 mg every 8 weeks.  Continue follow-up with rheumatology service.    3. REM sleep behavior disorder  Chronic stable condition  Continue with melatonin 10 mg daily and Klonopin 1 mg at bedtime..  Continue follow-up with sleep specialist    4. Gastroesophageal reflux disease without esophagitis  Chronic stable condition continue with omeprazole 20 Mg daily.    5. Dyslipidemia due to diabetes  - ALANINE AMINO-TRANS; Future  - Lipid Profile; Future  Chronic condition.  Current status unclear.  Lipid panel requested.  Continue with pravastatin 20 Mg daily.    6. Erectile dysfunction, unspecified erectile dysfunction type  Chronic stable condition per continue with sildenafil .  Continue follow-up with urology service    7. Encounter for abdominal aortic aneurysm (AAA) screening  - US-ABDOMINAL AORTA W/O DOPPLER; Future    8. Immunosuppressed status (HCC)  Chronic condition.  Patient presently asymptomatic.  Continue to monitor.            Attestation: I spent:   44   min -  That includes time for chart review before the visit, the actual patient visit, and time spent on documentation in EMR after the visit.  Chart review/prep, review of other providers' records, imaging/lab review, face-to-face time for history/examination, ordering, prescribing,  review of results/meds/ treatment plan with patient, and care coordination.               Please note that this dictation was created using voice recognition software. I have made every reasonable attempt to correct obvious errors, but I expect that there are errors of grammar and possibly content that I did not discover before finalizing the note.    Raul Low MD  Internal Medicine  [facility]

## 2023-05-02 NOTE — ASSESSMENT & PLAN NOTE
Chronic condition.  The patient currently being treated with Remicade 600 mg every 8 weeks.  Methotrexate 50 mg every 7 days.  Patient tolerating medication well.  No significant side effects reported.  Patient reported that symptoms are fairly well controlled at this time.

## 2023-05-02 NOTE — ASSESSMENT & PLAN NOTE
Chronic condition for the patient taking Remicade.  Patient denies fever chills chest pain shortness of breath or any other symptoms.

## 2023-05-02 NOTE — ASSESSMENT & PLAN NOTE
Chronic ongoing condition.  The patient is taking omeprazole 20 Mg daily.  Patient denies nausea vomiting dysphagia or unexplained weight loss.  The patient request prescription refills.

## 2023-05-02 NOTE — ASSESSMENT & PLAN NOTE
Chronic ongoing condition.  Patient followed by sleep medicine specialist.  Patient is presently taking melatonin Mg nightly and Klonopin 1 mg as needed.  Patient denies significant side effects.

## 2023-05-02 NOTE — ASSESSMENT & PLAN NOTE
Chronic ongoing condition.  The patient followed by urology.  Patient is taking sildenafil 20 mg 3 times a day.  No significant side effects noted.

## 2023-05-22 ENCOUNTER — TELEPHONE (OUTPATIENT)
Dept: RHEUMATOLOGY | Facility: MEDICAL CENTER | Age: 78
End: 2023-05-22
Payer: MEDICARE

## 2023-05-22 RX ORDER — DIPHENHYDRAMINE HYDROCHLORIDE 50 MG/ML
50 INJECTION INTRAMUSCULAR; INTRAVENOUS PRN
Status: CANCELLED | OUTPATIENT
Start: 2023-08-11

## 2023-05-22 RX ORDER — 0.9 % SODIUM CHLORIDE 0.9 %
10 VIAL (ML) INJECTION PRN
Status: CANCELLED | OUTPATIENT
Start: 2023-08-11

## 2023-05-22 RX ORDER — EPINEPHRINE 1 MG/ML(1)
0.5 AMPUL (ML) INJECTION PRN
Status: CANCELLED | OUTPATIENT
Start: 2023-08-11

## 2023-05-22 RX ORDER — SODIUM CHLORIDE 9 MG/ML
INJECTION, SOLUTION INTRAVENOUS CONTINUOUS
Status: CANCELLED | OUTPATIENT
Start: 2023-08-11

## 2023-05-22 RX ORDER — METHYLPREDNISOLONE SODIUM SUCCINATE 125 MG/2ML
125 INJECTION, POWDER, LYOPHILIZED, FOR SOLUTION INTRAMUSCULAR; INTRAVENOUS PRN
Status: CANCELLED | OUTPATIENT
Start: 2023-08-11

## 2023-05-22 RX ORDER — 0.9 % SODIUM CHLORIDE 0.9 %
3 VIAL (ML) INJECTION PRN
Status: CANCELLED | OUTPATIENT
Start: 2023-08-11

## 2023-05-22 RX ORDER — 0.9 % SODIUM CHLORIDE 0.9 %
VIAL (ML) INJECTION PRN
Status: CANCELLED | OUTPATIENT
Start: 2023-08-11

## 2023-05-22 RX ORDER — ACETAMINOPHEN 325 MG/1
650 TABLET ORAL ONCE
Status: CANCELLED | OUTPATIENT
Start: 2023-08-11

## 2023-05-22 NOTE — TELEPHONE ENCOUNTER
[facility] requesting new Infliximab infusion order, state previous is incomplete and   Please advise,

## 2023-05-23 DIAGNOSIS — G47.52 REM SLEEP BEHAVIOR DISORDER: ICD-10-CM

## 2023-05-23 NOTE — TELEPHONE ENCOUNTER
Have we ever prescribed this med? Yes.  If yes, what date?  3/27/2023    Last OV: 4/4/2023 -  DR. DESAI    Next OV: 10/4/2023 - DR. DESAI    DX: REM sleep behavior disorder [G47.52]    Medications: clonazePAM (KLONOPIN) 1 MG Tab

## 2023-05-24 RX ORDER — CLONAZEPAM 0.5 MG/1
1 TABLET ORAL NIGHTLY PRN
Qty: 60 TABLET | Refills: 2 | Status: SHIPPED | OUTPATIENT
Start: 2023-05-24 | End: 2023-06-25 | Stop reason: SDUPTHER

## 2023-05-25 ENCOUNTER — HOSPITAL ENCOUNTER (OUTPATIENT)
Dept: RADIOLOGY | Facility: MEDICAL CENTER | Age: 78
End: 2023-05-25
Attending: INTERNAL MEDICINE
Payer: MEDICARE

## 2023-05-25 DIAGNOSIS — Z13.6 ENCOUNTER FOR ABDOMINAL AORTIC ANEURYSM (AAA) SCREENING: ICD-10-CM

## 2023-05-25 PROCEDURE — 76775 US EXAM ABDO BACK WALL LIM: CPT

## 2023-06-06 ENCOUNTER — OUTPATIENT INFUSION SERVICES (OUTPATIENT)
Dept: ONCOLOGY | Facility: MEDICAL CENTER | Age: 78
End: 2023-06-06
Attending: STUDENT IN AN ORGANIZED HEALTH CARE EDUCATION/TRAINING PROGRAM
Payer: MEDICARE

## 2023-06-06 VITALS
TEMPERATURE: 97.4 F | HEIGHT: 66 IN | SYSTOLIC BLOOD PRESSURE: 130 MMHG | DIASTOLIC BLOOD PRESSURE: 73 MMHG | BODY MASS INDEX: 22.04 KG/M2 | OXYGEN SATURATION: 96 % | RESPIRATION RATE: 18 BRPM | HEART RATE: 95 BPM | WEIGHT: 137.13 LBS

## 2023-06-06 DIAGNOSIS — L40.50 PSORIATIC ARTHRITIS (HCC): ICD-10-CM

## 2023-06-06 DIAGNOSIS — L40.0 PLAQUE PSORIASIS: ICD-10-CM

## 2023-06-06 PROCEDURE — 700111 HCHG RX REV CODE 636 W/ 250 OVERRIDE (IP): Mod: TB | Performed by: STUDENT IN AN ORGANIZED HEALTH CARE EDUCATION/TRAINING PROGRAM

## 2023-06-06 PROCEDURE — 96413 CHEMO IV INFUSION 1 HR: CPT

## 2023-06-06 PROCEDURE — 700105 HCHG RX REV CODE 258: Performed by: STUDENT IN AN ORGANIZED HEALTH CARE EDUCATION/TRAINING PROGRAM

## 2023-06-06 RX ORDER — DIPHENHYDRAMINE HYDROCHLORIDE 50 MG/ML
50 INJECTION INTRAMUSCULAR; INTRAVENOUS PRN
Status: CANCELLED | OUTPATIENT
Start: 2023-08-01

## 2023-06-06 RX ORDER — 0.9 % SODIUM CHLORIDE 0.9 %
10 VIAL (ML) INJECTION PRN
Status: CANCELLED | OUTPATIENT
Start: 2023-08-01

## 2023-06-06 RX ORDER — ACETAMINOPHEN 325 MG/1
650 TABLET ORAL ONCE
Status: DISCONTINUED | OUTPATIENT
Start: 2023-06-06 | End: 2023-06-06 | Stop reason: HOSPADM

## 2023-06-06 RX ORDER — SODIUM CHLORIDE 9 MG/ML
INJECTION, SOLUTION INTRAVENOUS CONTINUOUS
Status: CANCELLED | OUTPATIENT
Start: 2023-08-01

## 2023-06-06 RX ORDER — 0.9 % SODIUM CHLORIDE 0.9 %
VIAL (ML) INJECTION PRN
Status: CANCELLED | OUTPATIENT
Start: 2023-08-01

## 2023-06-06 RX ORDER — EPINEPHRINE 1 MG/ML(1)
0.5 AMPUL (ML) INJECTION PRN
Status: CANCELLED | OUTPATIENT
Start: 2023-08-01

## 2023-06-06 RX ORDER — ACETAMINOPHEN 325 MG/1
650 TABLET ORAL ONCE
Status: CANCELLED | OUTPATIENT
Start: 2023-08-01

## 2023-06-06 RX ORDER — METHYLPREDNISOLONE SODIUM SUCCINATE 125 MG/2ML
125 INJECTION, POWDER, LYOPHILIZED, FOR SOLUTION INTRAMUSCULAR; INTRAVENOUS PRN
Status: CANCELLED | OUTPATIENT
Start: 2023-08-01

## 2023-06-06 RX ORDER — 0.9 % SODIUM CHLORIDE 0.9 %
3 VIAL (ML) INJECTION PRN
Status: CANCELLED | OUTPATIENT
Start: 2023-08-01

## 2023-06-06 RX ADMIN — INFLIXIMAB-AXXQ 300 MG: 100 INJECTION, POWDER, LYOPHILIZED, FOR SOLUTION INTRAVENOUS at 14:26

## 2023-06-06 ASSESSMENT — FIBROSIS 4 INDEX: FIB4 SCORE: 1.776672636296753619

## 2023-06-06 NOTE — PROGRESS NOTES
[facility] Avsola. Pt denied having any new or acute complaints today, reports tolerating past treatments well. PIV started, had + blood return flushed briskly. Pt given Avsola as prescribed, tolerated well, denied having any complaints during or after infusion. PIV discontinued, bleeding controlled with gauze and coban. Next appointment scheduled, Phuc discharged home to self care.

## 2023-06-25 DIAGNOSIS — G47.52 REM SLEEP BEHAVIOR DISORDER: ICD-10-CM

## 2023-06-26 RX ORDER — CLONAZEPAM 0.5 MG/1
1 TABLET ORAL NIGHTLY PRN
Qty: 60 TABLET | Refills: 2 | Status: SHIPPED | OUTPATIENT
Start: 2023-06-26 | End: 2023-07-19 | Stop reason: SDUPTHER

## 2023-06-26 NOTE — TELEPHONE ENCOUNTER
Have we ever prescribed this med? Yes.  If yes, what date? 4/4/2023    Last OV: 4/4/2023 - Dr. Hernandez    Next OV: 10/4/2023 - Dr. Hernandez    DX: REM sleep behavior disorder [G47.52]; Chronic insomnia [F51.04]    Medications: clonazePAM (KLONOPIN) 0.5 MG Tab

## 2023-07-19 DIAGNOSIS — G47.52 REM SLEEP BEHAVIOR DISORDER: ICD-10-CM

## 2023-07-19 RX ORDER — CLONAZEPAM 0.5 MG/1
1 TABLET ORAL NIGHTLY PRN
Qty: 60 TABLET | Refills: 2 | Status: SHIPPED | OUTPATIENT
Start: 2023-07-19 | End: 2023-08-18

## 2023-07-19 NOTE — TELEPHONE ENCOUNTER
Have we ever prescribed this med? Yes.  If yes, what date? Yes, 6/26/23    Last OV: 04/04/23    Next OV: 10/4/23    DX: REM sleep behavior disorder [G47.52]    Medications: clonazePAM (KLONOPIN) 0.5 MG Tab

## 2023-08-01 ENCOUNTER — OUTPATIENT INFUSION SERVICES (OUTPATIENT)
Dept: ONCOLOGY | Facility: MEDICAL CENTER | Age: 78
End: 2023-08-01
Attending: STUDENT IN AN ORGANIZED HEALTH CARE EDUCATION/TRAINING PROGRAM
Payer: MEDICARE

## 2023-08-01 VITALS
HEART RATE: 90 BPM | HEIGHT: 66 IN | BODY MASS INDEX: 21.93 KG/M2 | DIASTOLIC BLOOD PRESSURE: 60 MMHG | WEIGHT: 136.47 LBS | SYSTOLIC BLOOD PRESSURE: 107 MMHG | TEMPERATURE: 97.5 F | OXYGEN SATURATION: 96 % | RESPIRATION RATE: 16 BRPM

## 2023-08-01 DIAGNOSIS — L40.50 PSORIATIC ARTHRITIS (HCC): ICD-10-CM

## 2023-08-01 DIAGNOSIS — L40.0 PLAQUE PSORIASIS: ICD-10-CM

## 2023-08-01 LAB
BASOPHILS # BLD AUTO: 0.2 % (ref 0–1.8)
BASOPHILS # BLD: 0.02 K/UL (ref 0–0.12)
EOSINOPHIL # BLD AUTO: 0.37 K/UL (ref 0–0.51)
EOSINOPHIL NFR BLD: 4.4 % (ref 0–6.9)
ERYTHROCYTE [DISTWIDTH] IN BLOOD BY AUTOMATED COUNT: 47.4 FL (ref 35.9–50)
HCT VFR BLD AUTO: 39.4 % (ref 42–52)
HGB BLD-MCNC: 13.4 G/DL (ref 14–18)
IMM GRANULOCYTES # BLD AUTO: 0.02 K/UL (ref 0–0.11)
IMM GRANULOCYTES NFR BLD AUTO: 0.2 % (ref 0–0.9)
LYMPHOCYTES # BLD AUTO: 2.61 K/UL (ref 1–4.8)
LYMPHOCYTES NFR BLD: 30.8 % (ref 22–41)
MCH RBC QN AUTO: 31.3 PG (ref 27–33)
MCHC RBC AUTO-ENTMCNC: 34 G/DL (ref 32.3–36.5)
MCV RBC AUTO: 92.1 FL (ref 81.4–97.8)
MONOCYTES # BLD AUTO: 0.67 K/UL (ref 0–0.85)
MONOCYTES NFR BLD AUTO: 7.9 % (ref 0–13.4)
NEUTROPHILS # BLD AUTO: 4.79 K/UL (ref 1.82–7.42)
NEUTROPHILS NFR BLD: 56.5 % (ref 44–72)
NRBC # BLD AUTO: 0 K/UL
NRBC BLD-RTO: 0 /100 WBC (ref 0–0.2)
OUTPT INFUS CBC COMMENT OICOM: ABNORMAL
PLATELET # BLD AUTO: 186 K/UL (ref 164–446)
PMV BLD AUTO: 9.5 FL (ref 9–12.9)
RBC # BLD AUTO: 4.28 M/UL (ref 4.7–6.1)
WBC # BLD AUTO: 8.5 K/UL (ref 4.8–10.8)

## 2023-08-01 PROCEDURE — 700111 HCHG RX REV CODE 636 W/ 250 OVERRIDE (IP): Mod: JZ,TB | Performed by: STUDENT IN AN ORGANIZED HEALTH CARE EDUCATION/TRAINING PROGRAM

## 2023-08-01 PROCEDURE — 85025 COMPLETE CBC W/AUTO DIFF WBC: CPT

## 2023-08-01 PROCEDURE — 700105 HCHG RX REV CODE 258: Mod: JZ | Performed by: STUDENT IN AN ORGANIZED HEALTH CARE EDUCATION/TRAINING PROGRAM

## 2023-08-01 PROCEDURE — 96413 CHEMO IV INFUSION 1 HR: CPT

## 2023-08-01 RX ORDER — DIPHENHYDRAMINE HYDROCHLORIDE 50 MG/ML
50 INJECTION INTRAMUSCULAR; INTRAVENOUS PRN
Status: CANCELLED | OUTPATIENT
Start: 2023-09-26

## 2023-08-01 RX ORDER — 0.9 % SODIUM CHLORIDE 0.9 %
10 VIAL (ML) INJECTION PRN
Status: CANCELLED | OUTPATIENT
Start: 2023-09-26

## 2023-08-01 RX ORDER — EPINEPHRINE 1 MG/ML(1)
0.5 AMPUL (ML) INJECTION PRN
Status: CANCELLED | OUTPATIENT
Start: 2023-09-26

## 2023-08-01 RX ORDER — ACETAMINOPHEN 325 MG/1
650 TABLET ORAL ONCE
Status: DISCONTINUED | OUTPATIENT
Start: 2023-08-01 | End: 2023-08-01 | Stop reason: HOSPADM

## 2023-08-01 RX ORDER — ACETAMINOPHEN 325 MG/1
650 TABLET ORAL ONCE
Status: CANCELLED | OUTPATIENT
Start: 2023-09-26

## 2023-08-01 RX ORDER — SODIUM CHLORIDE 9 MG/ML
INJECTION, SOLUTION INTRAVENOUS CONTINUOUS
Status: CANCELLED | OUTPATIENT
Start: 2023-09-26

## 2023-08-01 RX ORDER — METHYLPREDNISOLONE SODIUM SUCCINATE 125 MG/2ML
125 INJECTION, POWDER, LYOPHILIZED, FOR SOLUTION INTRAMUSCULAR; INTRAVENOUS PRN
Status: CANCELLED | OUTPATIENT
Start: 2023-09-26

## 2023-08-01 RX ORDER — 0.9 % SODIUM CHLORIDE 0.9 %
VIAL (ML) INJECTION PRN
Status: CANCELLED | OUTPATIENT
Start: 2023-09-26

## 2023-08-01 RX ORDER — 0.9 % SODIUM CHLORIDE 0.9 %
3 VIAL (ML) INJECTION PRN
Status: CANCELLED | OUTPATIENT
Start: 2023-09-26

## 2023-08-01 RX ADMIN — INFLIXIMAB-AXXQ 300 MG: 100 INJECTION, POWDER, LYOPHILIZED, FOR SOLUTION INTRAVENOUS at 14:14

## 2023-08-01 ASSESSMENT — FIBROSIS 4 INDEX: FIB4 SCORE: 1.776672636296753619

## 2023-08-29 ENCOUNTER — HOSPITAL ENCOUNTER (OUTPATIENT)
Dept: LAB | Facility: MEDICAL CENTER | Age: 78
End: 2023-08-29
Attending: INTERNAL MEDICINE
Payer: MEDICARE

## 2023-08-29 DIAGNOSIS — E78.5 TYPE 2 DIABETES MELLITUS WITH HYPERLIPIDEMIA (HCC): ICD-10-CM

## 2023-08-29 DIAGNOSIS — E11.69 DYSLIPIDEMIA DUE TO TYPE 2 DIABETES MELLITUS (HCC): ICD-10-CM

## 2023-08-29 DIAGNOSIS — E11.69 TYPE 2 DIABETES MELLITUS WITH HYPERLIPIDEMIA (HCC): ICD-10-CM

## 2023-08-29 DIAGNOSIS — E78.5 DYSLIPIDEMIA DUE TO TYPE 2 DIABETES MELLITUS (HCC): ICD-10-CM

## 2023-08-29 LAB
ALT SERPL-CCNC: 32 U/L (ref 2–50)
CHOLEST SERPL-MCNC: 121 MG/DL (ref 100–199)
CREAT UR-MCNC: 76.88 MG/DL
EST. AVERAGE GLUCOSE BLD GHB EST-MCNC: 183 MG/DL
FASTING STATUS PATIENT QL REPORTED: NORMAL
HBA1C MFR BLD: 8 % (ref 4–5.6)
HDLC SERPL-MCNC: 42 MG/DL
LDLC SERPL CALC-MCNC: 54 MG/DL
MICROALBUMIN UR-MCNC: 1.9 MG/DL
MICROALBUMIN/CREAT UR: 25 MG/G (ref 0–30)
TRIGL SERPL-MCNC: 127 MG/DL (ref 0–149)

## 2023-08-29 PROCEDURE — 84460 ALANINE AMINO (ALT) (SGPT): CPT

## 2023-08-29 PROCEDURE — 82043 UR ALBUMIN QUANTITATIVE: CPT

## 2023-08-29 PROCEDURE — 36415 COLL VENOUS BLD VENIPUNCTURE: CPT

## 2023-08-29 PROCEDURE — 80061 LIPID PANEL: CPT

## 2023-08-29 PROCEDURE — 83036 HEMOGLOBIN GLYCOSYLATED A1C: CPT | Mod: GA

## 2023-08-29 PROCEDURE — 82570 ASSAY OF URINE CREATININE: CPT

## 2023-09-05 ENCOUNTER — HOSPITAL ENCOUNTER (OUTPATIENT)
Dept: RADIOLOGY | Facility: MEDICAL CENTER | Age: 78
End: 2023-09-05
Attending: INTERNAL MEDICINE
Payer: MEDICARE

## 2023-09-05 ENCOUNTER — OFFICE VISIT (OUTPATIENT)
Dept: MEDICAL GROUP | Facility: PHYSICIAN GROUP | Age: 78
End: 2023-09-05
Payer: MEDICARE

## 2023-09-05 VITALS
HEART RATE: 84 BPM | RESPIRATION RATE: 18 BRPM | DIASTOLIC BLOOD PRESSURE: 70 MMHG | SYSTOLIC BLOOD PRESSURE: 120 MMHG | TEMPERATURE: 97.9 F | OXYGEN SATURATION: 98 % | BODY MASS INDEX: 21.56 KG/M2 | HEIGHT: 67 IN | WEIGHT: 137.38 LBS

## 2023-09-05 DIAGNOSIS — K21.9 GASTROESOPHAGEAL REFLUX DISEASE WITHOUT ESOPHAGITIS: Chronic | ICD-10-CM

## 2023-09-05 DIAGNOSIS — E78.5 TYPE 2 DIABETES MELLITUS WITH HYPERLIPIDEMIA (HCC): Chronic | ICD-10-CM

## 2023-09-05 DIAGNOSIS — E78.5 DYSLIPIDEMIA DUE TO TYPE 2 DIABETES MELLITUS (HCC): Chronic | ICD-10-CM

## 2023-09-05 DIAGNOSIS — E11.69 TYPE 2 DIABETES MELLITUS WITH HYPERLIPIDEMIA (HCC): Chronic | ICD-10-CM

## 2023-09-05 DIAGNOSIS — Z23 NEED FOR VACCINATION: ICD-10-CM

## 2023-09-05 DIAGNOSIS — R06.09 DYSPNEA ON EXERTION: ICD-10-CM

## 2023-09-05 DIAGNOSIS — L40.50 PSORIATIC ARTHRITIS (HCC): ICD-10-CM

## 2023-09-05 DIAGNOSIS — G47.52 REM SLEEP BEHAVIOR DISORDER: Chronic | ICD-10-CM

## 2023-09-05 DIAGNOSIS — D84.9 IMMUNOSUPPRESSED STATUS (HCC): Chronic | ICD-10-CM

## 2023-09-05 DIAGNOSIS — E11.69 DYSLIPIDEMIA DUE TO TYPE 2 DIABETES MELLITUS (HCC): Chronic | ICD-10-CM

## 2023-09-05 PROCEDURE — 93000 ELECTROCARDIOGRAM COMPLETE: CPT | Mod: GZ | Performed by: INTERNAL MEDICINE

## 2023-09-05 PROCEDURE — 99215 OFFICE O/P EST HI 40 MIN: CPT | Performed by: INTERNAL MEDICINE

## 2023-09-05 PROCEDURE — 3074F SYST BP LT 130 MM HG: CPT | Performed by: INTERNAL MEDICINE

## 2023-09-05 PROCEDURE — 3078F DIAST BP <80 MM HG: CPT | Performed by: INTERNAL MEDICINE

## 2023-09-05 PROCEDURE — 71046 X-RAY EXAM CHEST 2 VIEWS: CPT

## 2023-09-05 RX ORDER — CLONAZEPAM 0.5 MG/1
TABLET ORAL
COMMUNITY
Start: 2023-08-21 | End: 2023-09-05

## 2023-09-05 RX ORDER — SILDENAFIL 25 MG/1
1 TABLET, FILM COATED ORAL
COMMUNITY
End: 2023-09-05

## 2023-09-05 RX ORDER — FOLIC ACID 1 MG/1
1 TABLET ORAL
COMMUNITY
End: 2023-09-05

## 2023-09-05 RX ORDER — PRAVASTATIN SODIUM 20 MG
1 TABLET ORAL
COMMUNITY
End: 2023-09-05

## 2023-09-05 RX ORDER — EMPAGLIFLOZIN 10 MG/1
10 TABLET, FILM COATED ORAL DAILY
Qty: 90 TABLET | Refills: 3 | Status: SHIPPED
Start: 2023-09-05 | End: 2023-10-04

## 2023-09-05 RX ORDER — CLONAZEPAM 1 MG/1
TABLET ORAL
COMMUNITY
End: 2023-09-05

## 2023-09-05 RX ORDER — CLONAZEPAM 0.5 MG/1
TABLET ORAL 2 TIMES DAILY
COMMUNITY
End: 2024-01-16

## 2023-09-05 ASSESSMENT — FIBROSIS 4 INDEX: FIB4 SCORE: 1.71

## 2023-09-05 NOTE — ASSESSMENT & PLAN NOTE
Chronic ongoing condition.  Patient followed by sleep medicine specialist.  Patient takes 1 mg of clonazepam as needed.  He also take melatonin.  Patient tolerating medications well

## 2023-09-05 NOTE — ASSESSMENT & PLAN NOTE
Chronic condition.  Patient is currently taking Remicade treatment.  Patient tolerant medication well.  The patient denies fever chills chest pain shortness of breath abdominal pain dysuria or any other symptoms.

## 2023-09-05 NOTE — ASSESSMENT & PLAN NOTE
Chronic ongoing condition.  The patient is taking pravastatin 20 Mg daily.  The patient denies significant side effects.  Lipid panel result discussed with the patient.

## 2023-09-05 NOTE — ASSESSMENT & PLAN NOTE
Chronic condition.  Uncontrolled.  Recent blood test show A1c 8%.    Patient presently taking glimepiride 2 mg twice daily and metformin 1000 mg twice daily.  Patient denies significant hypoglycemic symptoms.  Recent lab test result discussed with the patient.

## 2023-09-05 NOTE — ASSESSMENT & PLAN NOTE
This is a new condition noted since last couple of months.  Patient ports shortness of breath with simple activities such as walking across the room or bending over.  The patient denies chest discomfort nausea vomiting or diaphoresis.  Currently the patient asymptomatic.  Patient does not smoke.

## 2023-09-05 NOTE — ASSESSMENT & PLAN NOTE
Chronic ongoing condition.  The patient followed by rheumatology service.  The patient is presently being treated with methotrexate and Remicade injection every 8 weeks.  Patient tolerated treatment well.  No significant side effects reported.   subcutaneous suture/skin suture

## 2023-09-05 NOTE — PROGRESS NOTES
PRIMARY CARE CLINIC VISIT    Chief complaint:    Dyspnea on exertion  Follow-up diabetes  Psoriatic arthritis  REM sleep behavior disorder  Hyperlipidemia   acid reflux  Test results  Vaccination status    History of Present Illness     Need for vaccination  Pt is due for TDAP AND Pneumo 20  Pt will go to local pharmacy    Type 2 diabetes mellitus with hyperlipidemia (HCC)  Chronic condition.  Uncontrolled.  Recent blood test show A1c 8%.    Patient presently taking glimepiride 2 mg twice daily and metformin 1000 mg twice daily.  Patient denies significant hypoglycemic symptoms.  Recent lab test result discussed with the patient.    Psoriatic arthritis (HCC)  Chronic ongoing condition.  The patient followed by rheumatology service.  The patient is presently being treated with methotrexate and Remicade injection every 8 weeks.  Patient tolerated treatment well.  No significant side effects reported.    REM sleep behavior disorder  Chronic ongoing condition.  Patient followed by sleep medicine specialist.  Patient takes 1 mg of clonazepam as needed.  He also take melatonin.  Patient tolerating medications well    Dyslipidemia due to type 2 diabetes mellitus (HCC)  Chronic ongoing condition.  The patient is taking pravastatin 20 Mg daily.  The patient denies significant side effects.  Lipid panel result discussed with the patient.    Immunosuppressed status (HCC)  Chronic condition.  Patient is currently taking Remicade treatment.  Patient tolerant medication well.  The patient denies fever chills chest pain shortness of breath abdominal pain dysuria or any other symptoms.    Gastroesophageal reflux disease without esophagitis  Chronic ongoing condition.  The patient takes omeprazole 20 Mg daily.  Patient denies nausea vomiting dysphagia or unexplained weight loss.    Dyspnea on exertion  This is a new condition noted since last couple of months.  Patient ports shortness of breath with simple activities such as walking  across the room or bending over.  The patient denies chest discomfort nausea vomiting or diaphoresis.  Currently the patient asymptomatic.  Patient does not smoke.    Current Outpatient Medications on File Prior to Visit   Medication Sig Dispense Refill    clonazePAM (KLONOPIN) 0.5 MG Tab Take  by mouth 2 times a day.      methotrexate 2.5 MG Tab TAKE 6 TABLETS BY MOUTH EVERY 7 DAYS 76 Tablet 3    glimepiride (AMARYL) 2 MG Tab Take 1 Tablet by mouth 2 times a day. 180 Tablet 3    omeprazole (PRILOSEC) 20 MG delayed-release capsule Take 1 Capsule by mouth every day. 90 Capsule 3    metformin (GLUCOPHAGE) 1000 MG tablet Take 1 Tablet by mouth 2 times a day with meals. 180 Tablet 3    pravastatin (PRAVACHOL) 20 MG Tab Take 1 Tablet by mouth every evening. 90 Tablet 3    inFLIXimab (REMICADE) 100 MG Recon Soln Infuse 600 mg into a venous catheter every 8 weeks.      Loratadine (KLS ALLERCLEAR PO)       folic acid (FOLVITE) 1 MG Tab Take 1 Tablet by mouth every day. 90 Tablet 3    therapeutic multivitamin-minerals (THERAGRAN-M) Tab Take 1 Tablet by mouth every day.      aspirin (ASA) 81 MG Chew Tab chewable tablet Chew 1 Tablet every day.      Melatonin 10 MG Cap Take 20 mg by mouth every evening.      Acetaminophen (TYLENOL ARTHRITIS PAIN PO) Take 1,250 mg by mouth every day.       No current facility-administered medications on file prior to visit.        Allergies: Patient has no known allergies.    Current Outpatient Medications Ordered in Epic   Medication Sig Dispense Refill    clonazePAM (KLONOPIN) 0.5 MG Tab Take  by mouth 2 times a day.      Empagliflozin (JARDIANCE) 10 MG Tab tablet Take 1 Tablet by mouth every day. 90 Tablet 3    methotrexate 2.5 MG Tab TAKE 6 TABLETS BY MOUTH EVERY 7 DAYS 76 Tablet 3    glimepiride (AMARYL) 2 MG Tab Take 1 Tablet by mouth 2 times a day. 180 Tablet 3    omeprazole (PRILOSEC) 20 MG delayed-release capsule Take 1 Capsule by mouth every day. 90 Capsule 3    metformin (GLUCOPHAGE)  1000 MG tablet Take 1 Tablet by mouth 2 times a day with meals. 180 Tablet 3    pravastatin (PRAVACHOL) 20 MG Tab Take 1 Tablet by mouth every evening. 90 Tablet 3    inFLIXimab (REMICADE) 100 MG Recon Soln Infuse 600 mg into a venous catheter every 8 weeks.      Loratadine (KLS ALLERCLEAR PO)       folic acid (FOLVITE) 1 MG Tab Take 1 Tablet by mouth every day. 90 Tablet 3    therapeutic multivitamin-minerals (THERAGRAN-M) Tab Take 1 Tablet by mouth every day.      aspirin (ASA) 81 MG Chew Tab chewable tablet Chew 1 Tablet every day.      Melatonin 10 MG Cap Take 20 mg by mouth every evening.      Acetaminophen (TYLENOL ARTHRITIS PAIN PO) Take 1,250 mg by mouth every day.       No current [facility]-ordered facility-administered medications on file.       Past Medical History:   Diagnosis Date    Back pain     Back pain     Chickenpox     Diabetes (HCC)     Double vision     Frequent urination     Heartburn     Hyperlipidemia     Influenza     Mumps     Nasal drainage     Painful joint     Psoriatic arthritis (HCC)     Rash     REM sleep behavior disorder     Rheumatoid arthritis (HCC)     Sleep apnea     Sore muscles     Tonsillitis     Wears glasses        Past Surgical History:   Procedure Laterality Date    CATARACT EXTRACTION WITH IOL Bilateral     [place] REMV 2ND CATARACT,CORN-SCLER SECTN         Family History   Problem Relation Age of Onset    Cancer Mother         lung and brain -  at 88    Heart Disease Father         passed at 62    Hypertension Sister     Hypertension Brother     Cancer Brother        Social History     Tobacco Use   Smoking Status Former   Smokeless Tobacco Never   Tobacco Comments    quit in        Social History     Substance and Sexual Activity   Alcohol Use Yes    Alcohol/week: 1.2 oz    Types: 2 Glasses of wine per week    Comment: occasional glass of wine       Review of systems.  As per HPI above. All other systems reviewed and negative.      Past Medical, Social, and Family  "history reviewed and updated in EPIC     Objective     /70   Pulse 84   Temp 36.6 °C (97.9 °F) (Temporal)   Resp 18   Ht 1.702 m (5' 7\")   Wt 62.3 kg (137 lb 6 oz)   SpO2 98%    Body mass index is 21.52 kg/m².    General: alert in no apparent distress.  Cardiovascular: regular rate and rhythm  Pulmonary: lungs : no wheezing   Gastrointestinal: BS present. No obvious mass noted    I independently interpreted the patient's ekg:   Sinus rhythm.  Rate 82.  Probable left atrial enlargement.  Borderline EKG.  Result discussed with the patient.    Lab Results   Component Value Date/Time    HBA1C 8.0 (H) 08/29/2023 09:14 AM    HBA1C 6.8 (H) 04/26/2023 08:34 AM    HBA1C 7.6 (A) 11/02/2022 01:12 AM         Lab Results   Component Value Date/Time    SODIUM 139 04/26/2023 08:34 AM    POTASSIUM 5.4 04/26/2023 08:34 AM    GLUCOSE 174 (H) 04/26/2023 08:34 AM    BUN 24 (H) 04/26/2023 08:34 AM    CREATININE 1.20 04/26/2023 08:34 AM       Lab Results   Component Value Date/Time    CHOLSTRLTOT 121 08/29/2023 09:14 AM    TRIGLYCERIDE 127 08/29/2023 09:14 AM    HDL 42 08/29/2023 09:14 AM    LDL 54 08/29/2023 09:14 AM       Lab Results   Component Value Date/Time    ALTSGPT 32 08/29/2023 09:14 AM             Assessment and Plan     1. Dyspnea on exertion  This is a new condition.  Unstable.  The cause unclear needing further evaluation.  Rule out possible CAD versus pulmonary disorder versus others  - EKG  - REFERRAL TO CARDIOLOGY  - DX-CHEST-2 VIEWS; Future    2. Type 2 diabetes mellitus with hyperlipidemia (HCC)  - HEMOGLOBIN A1C; Future  - Basic Metabolic Panel; Future  Chronic condition.  Unstable.  A1c is high 8%.  Lab test result discussed with the patient.  In addition to metformin and glimepiride I recommended patient to start Jardiance 10 mg daily.  Potential side effect of medication discussed with the patient.  Recommend follow-up in 3 to 4 months to repeat the lab test.  A1c goal of 7% discussed.  Basic physiology " of Diabetes was explained to patient as well as possible microvascular/macrovascular complications.  Pt's education:   The importance of increasing physical activity to improve diabetes control was discussed with the patient.   Patient was also educated on changing diet and making better choices to help control blood sugar.   Discussed FBG goal of 100-120, 2hr PP <180       3. Psoriatic arthritis (HCC)  Chronic stable condition.  Continue with methotrexate 15 Mg every 7 days.  And Remicade treatment 600 mg every 8 weeks.  Continue follow-up with rheumatology service.    4. REM sleep behavior disorder  Chronic stable condition.  Continue with Klonopin 0.5 mg daily as needed and melatonin.  Continue follow-up with sleep medicine specialist.    5. Dyslipidemia due to type 2 diabetes mellitus (HCC)  - ALANINE AMINO-TRANS; Future  - Lipid Profile; Future  Chronic stable condition.  Continue pravastatin 20 Mg daily.    6. Gastroesophageal reflux disease without esophagitis  Chronic stable condition.  Continue with omeprazole 20 Mg daily.    7. Immunosuppressed status (HCC)  Chronic condition.  The patient presently on Remicade treatment.  Patient is asymptomatic.  Continue to monitor.  Continue follow-up with rheumatology service.    8. Need for vaccination  Reminded patient to go to local pharmacy for Tdap and pneumonia vaccination.        Attestation: I spent:  43   min -  That includes time for chart review before the visit, the actual patient visit, and time spent on documentation in EMR after the visit.  Chart review/prep, review of other providers' records, imaging/lab review, face-to-face time for history/examination, pt's counseling/education, ordering, prescribing,  review of results/meds/ treatment plan with patient, and care coordination.    The patient is of extensive complexity.                Please note that this dictation was created using voice recognition software. I have made every reasonable attempt to  correct obvious errors, but I expect that there are errors of grammar and possibly content that I did not discover before finalizing the note.    Raul Low MD  Internal Medicine  [facility]

## 2023-09-05 NOTE — ASSESSMENT & PLAN NOTE
Chronic ongoing condition.  The patient takes omeprazole 20 Mg daily.  Patient denies nausea vomiting dysphagia or unexplained weight loss.

## 2023-09-26 ENCOUNTER — OUTPATIENT INFUSION SERVICES (OUTPATIENT)
Dept: ONCOLOGY | Facility: MEDICAL CENTER | Age: 78
End: 2023-09-26
Attending: STUDENT IN AN ORGANIZED HEALTH CARE EDUCATION/TRAINING PROGRAM
Payer: MEDICARE

## 2023-09-26 VITALS
WEIGHT: 136.69 LBS | HEIGHT: 66 IN | RESPIRATION RATE: 18 BRPM | DIASTOLIC BLOOD PRESSURE: 70 MMHG | HEART RATE: 94 BPM | BODY MASS INDEX: 21.97 KG/M2 | SYSTOLIC BLOOD PRESSURE: 128 MMHG | TEMPERATURE: 97.4 F | OXYGEN SATURATION: 95 %

## 2023-09-26 DIAGNOSIS — L40.0 PLAQUE PSORIASIS: ICD-10-CM

## 2023-09-26 DIAGNOSIS — L40.50 PSORIATIC ARTHRITIS (HCC): ICD-10-CM

## 2023-09-26 PROCEDURE — 96413 CHEMO IV INFUSION 1 HR: CPT

## 2023-09-26 PROCEDURE — 700111 HCHG RX REV CODE 636 W/ 250 OVERRIDE (IP): Mod: JZ,TB | Performed by: STUDENT IN AN ORGANIZED HEALTH CARE EDUCATION/TRAINING PROGRAM

## 2023-09-26 PROCEDURE — 96365 THER/PROPH/DIAG IV INF INIT: CPT

## 2023-09-26 PROCEDURE — 700105 HCHG RX REV CODE 258: Performed by: STUDENT IN AN ORGANIZED HEALTH CARE EDUCATION/TRAINING PROGRAM

## 2023-09-26 RX ORDER — ACETAMINOPHEN 325 MG/1
650 TABLET ORAL ONCE
Status: DISCONTINUED | OUTPATIENT
Start: 2023-09-26 | End: 2023-09-26 | Stop reason: HOSPADM

## 2023-09-26 RX ORDER — 0.9 % SODIUM CHLORIDE 0.9 %
10 VIAL (ML) INJECTION PRN
Status: CANCELLED | OUTPATIENT
Start: 2023-11-21

## 2023-09-26 RX ORDER — 0.9 % SODIUM CHLORIDE 0.9 %
VIAL (ML) INJECTION PRN
Status: CANCELLED | OUTPATIENT
Start: 2023-11-21

## 2023-09-26 RX ORDER — DIPHENHYDRAMINE HYDROCHLORIDE 50 MG/ML
50 INJECTION INTRAMUSCULAR; INTRAVENOUS PRN
Status: CANCELLED | OUTPATIENT
Start: 2023-11-21

## 2023-09-26 RX ORDER — METHYLPREDNISOLONE SODIUM SUCCINATE 125 MG/2ML
125 INJECTION, POWDER, LYOPHILIZED, FOR SOLUTION INTRAMUSCULAR; INTRAVENOUS PRN
Status: CANCELLED | OUTPATIENT
Start: 2023-11-21

## 2023-09-26 RX ORDER — ACETAMINOPHEN 325 MG/1
650 TABLET ORAL ONCE
Status: CANCELLED | OUTPATIENT
Start: 2023-11-21

## 2023-09-26 RX ORDER — 0.9 % SODIUM CHLORIDE 0.9 %
3 VIAL (ML) INJECTION PRN
Status: CANCELLED | OUTPATIENT
Start: 2023-11-21

## 2023-09-26 RX ORDER — EPINEPHRINE 1 MG/ML(1)
0.5 AMPUL (ML) INJECTION PRN
Status: CANCELLED | OUTPATIENT
Start: 2023-11-21

## 2023-09-26 RX ORDER — SODIUM CHLORIDE 9 MG/ML
INJECTION, SOLUTION INTRAVENOUS CONTINUOUS
Status: CANCELLED | OUTPATIENT
Start: 2023-11-21

## 2023-09-26 RX ADMIN — INFLIXIMAB-AXXQ 300 MG: 100 INJECTION, POWDER, LYOPHILIZED, FOR SOLUTION INTRAVENOUS at 13:56

## 2023-09-26 ASSESSMENT — FIBROSIS 4 INDEX: FIB4 SCORE: 1.71

## 2023-09-26 NOTE — PROGRESS NOTES
Pt arrived ambulatory for Avsola, denies c/o, VSS.  PIV started in RFA x 1, pt tolerated well.  Pt refused all premeds, Avsola infused over one hour, pt tolerated well, no reaction noted.  Pt Dc'd home without incident and will return as scheduled.

## 2023-10-04 ENCOUNTER — OFFICE VISIT (OUTPATIENT)
Dept: SLEEP MEDICINE | Facility: MEDICAL CENTER | Age: 78
End: 2023-10-04
Attending: STUDENT IN AN ORGANIZED HEALTH CARE EDUCATION/TRAINING PROGRAM
Payer: MEDICARE

## 2023-10-04 VITALS
HEART RATE: 94 BPM | HEIGHT: 67 IN | RESPIRATION RATE: 16 BRPM | OXYGEN SATURATION: 96 % | BODY MASS INDEX: 21.5 KG/M2 | DIASTOLIC BLOOD PRESSURE: 68 MMHG | SYSTOLIC BLOOD PRESSURE: 116 MMHG | WEIGHT: 137 LBS

## 2023-10-04 DIAGNOSIS — G47.52 REM SLEEP BEHAVIOR DISORDER: Primary | ICD-10-CM

## 2023-10-04 PROCEDURE — 3074F SYST BP LT 130 MM HG: CPT | Performed by: STUDENT IN AN ORGANIZED HEALTH CARE EDUCATION/TRAINING PROGRAM

## 2023-10-04 PROCEDURE — 3078F DIAST BP <80 MM HG: CPT | Performed by: STUDENT IN AN ORGANIZED HEALTH CARE EDUCATION/TRAINING PROGRAM

## 2023-10-04 PROCEDURE — 99212 OFFICE O/P EST SF 10 MIN: CPT | Performed by: STUDENT IN AN ORGANIZED HEALTH CARE EDUCATION/TRAINING PROGRAM

## 2023-10-04 PROCEDURE — 99213 OFFICE O/P EST LOW 20 MIN: CPT | Performed by: STUDENT IN AN ORGANIZED HEALTH CARE EDUCATION/TRAINING PROGRAM

## 2023-10-04 RX ORDER — CLONAZEPAM 0.5 MG/1
1.5 TABLET ORAL NIGHTLY
Qty: 90 TABLET | Refills: 2 | Status: SHIPPED | OUTPATIENT
Start: 2023-10-04 | End: 2024-01-09 | Stop reason: SDUPTHER

## 2023-10-04 ASSESSMENT — FIBROSIS 4 INDEX: FIB4 SCORE: 1.71

## 2023-10-04 NOTE — PROGRESS NOTES
[facility] Follow-up Visit    CC: follow up regarding management of RBD      HPI:  Phuc Mcdowell is a 77 y.o.male  with type 2 diabetes mellitus, psoriatic arthritis, psoriasis, hyperlipidemia, and REM behavior disorder.  Presents today to follow-up regarding management of her behavior disorder.    He is currently taking 1 mg of Klonopin nightly.  He finds that at this dosage he has been having nightly behaviors in his sleep.  He is also on melatonin 10 mg nightly.  He is hopeful that if he increases the dosage of his Klonopin that he may find that his actions become less.  He has previously tried 2 mg in the past which made him more groggy during the day.    He is aware of better precautions.    Sleep History  Last sleep study was done in September 2017 in [place] which showed REM without atonia.  Study at that time did not show obstructive sleep apnea with an overall AHI of 1.1.  Patient believes he did use CPAP up until the sleep study.    Patient Active Problem List    Diagnosis Date Noted    Need for vaccination 09/05/2023    Dyspnea on exertion 09/05/2023    Gastroesophageal reflux disease without esophagitis 05/02/2023    Immunosuppressed status (HCC) 04/25/2023    Primary osteoarthritis of both shoulders 04/25/2023    Chronic pain of left elbow 07/26/2022    Plaque psoriasis 04/19/2022    Long-term use of immunosuppressant medication 04/17/2022    Dyslipidemia due to type 2 diabetes mellitus (HCC) 04/12/2022    REM sleep behavior disorder 04/11/2022    Psoriatic arthritis ([facility]) 04/11/2022    Type 2 diabetes mellitus with hyperlipidemia ([facility]) 04/11/2022       Past Medical History:   Diagnosis Date    Back pain     Back pain     Chickenpox     Diabetes ([facility])     Double vision     Frequent urination     Heartburn     Hyperlipidemia     Influenza     Mumps     Nasal drainage     Painful joint     Psoriatic arthritis ([facility])     Rash     REM sleep behavior disorder     Rheumatoid arthritis ([facility])      Sleep apnea     Sore muscles     Tonsillitis     Wears glasses         Past Surgical History:   Procedure Laterality Date    CATARACT EXTRACTION WITH IOL Bilateral 2016    [place] REMV 2ND CATARACT,CORN-SCLER SECTN         Family History   Problem Relation Age of Onset    Cancer Mother         lung and brain -  at 88    Heart Disease Father         passed at 62    Hypertension Sister     Hypertension Brother     Cancer Brother        Social History     Socioeconomic History    Marital status:      Spouse name: Not on file    Number of children: Not on file    Years of education: Not on file    Highest education level: Bachelor's degree (e.g., BA, AB, BS)   Occupational History    Not on file   Tobacco Use    Smoking status: Former    Smokeless tobacco: Never    Tobacco comments:     quit in    Vaping Use    Vaping Use: Never used   Substance and Sexual Activity    Alcohol use: Yes     Alcohol/week: 1.2 oz     Types: 2 Glasses of wine per week     Comment: occasional glass of wine    Drug use: Never    Sexual activity: Yes     Partners: Female     Birth control/protection: None   Other Topics Concern    Not on file   Social History Narrative    Not on file     Social Determinants of Health     Financial Resource Strain: Low Risk  (2022)    Overall Financial Resource Strain (CARDIA)     Difficulty of Paying Living Expenses: Not very hard   Food Insecurity: No Food Insecurity (2022)    Hunger Vital Sign     Worried About Running Out of Food in the Last Year: Never true     Ran Out of Food in the Last Year: Never true   Transportation Needs: No Transportation Needs (2022)    PRAPARE - Transportation     Lack of Transportation (Medical): No     Lack of Transportation (Non-Medical): No   Physical Activity: Sufficiently Active (2022)    Exercise Vital Sign     Days of Exercise per Week: 3 days     Minutes of Exercise per Session: 60 min   Stress: No Stress Concern Present (2022)     [employer] - Occupational Stress Questionnaire     Feeling of Stress : Not at all   Social Connections: Not on file   Intimate Partner Violence: Not on file   Housing Stability: Low Risk  (11/1/2022)    Housing Stability Vital Sign     Unable to Pay for Housing in the Last Year: No     Number of Places Lived in the Last Year: 2     Unstable Housing in the Last Year: No       Current Outpatient Medications   Medication Sig Dispense Refill    clonazePAM (KLONOPIN) 0.5 MG Tab Take 3 Tablets by mouth every evening for 90 days. Indications: Disorder of Rapid Eye Movement Period of Sleep 90 Tablet 2    folic acid (FOLVITE) 1 MG Tab Take 1 Tablet by mouth every day. Except the day of methotrexate. 90 Tablet 3    Loratadine (KLS ALLERCLEAR PO)       clonazePAM (KLONOPIN) 0.5 MG Tab Take  by mouth 2 times a day.      methotrexate 2.5 MG Tab TAKE 6 TABLETS BY MOUTH EVERY 7 DAYS 76 Tablet 3    glimepiride (AMARYL) 2 MG Tab Take 1 Tablet by mouth 2 times a day. 180 Tablet 3    omeprazole (PRILOSEC) 20 MG delayed-release capsule Take 1 Capsule by mouth every day. 90 Capsule 3    metformin (GLUCOPHAGE) 1000 MG tablet Take 1 Tablet by mouth 2 times a day with meals. 180 Tablet 3    pravastatin (PRAVACHOL) 20 MG Tab Take 1 Tablet by mouth every evening. 90 Tablet 3    inFLIXimab (REMICADE) 100 MG Recon Soln Infuse 600 mg into a venous catheter every 8 weeks.      therapeutic multivitamin-minerals (THERAGRAN-M) Tab Take 1 Tablet by mouth every day.      aspirin (ASA) 81 MG Chew Tab chewable tablet Chew 1 Tablet every day.      Melatonin 10 MG Cap Take 20 mg by mouth every evening.      Acetaminophen (TYLENOL ARTHRITIS PAIN PO) Take 1,250 mg by mouth every day.      Empagliflozin (JARDIANCE) 10 MG Tab tablet Take 1 Tablet by mouth every day. 90 Tablet 3     No current facility-administered medications for this visit.        ALLERGIES: Patient has no known allergies.    ROS  Constitutional: Denies fevers,  "Denies weight changes  Ears/Nose/Throat/Mouth: Denies nasal congestion or sore throat   Cardiovascular: Denies chest pain  Respiratory: Denies shortness of breath, Denies cough  Gastrointestinal/Hepatic: Denies nausea, vomiting  Sleep: see HPI      PHYSICAL EXAM  /68 (BP Location: Left arm, Patient Position: Sitting, BP Cuff Size: Large adult)   Pulse 94   Resp 16   Ht 1.702 m (5' 7\")   Wt 62.1 kg (137 lb)   SpO2 96%   BMI 21.46 kg/m²   Appearance: Well-nourished, well-developed, no acute distress  Eyes:  No scleral icterus , EOMI  Musculoskeletal:  Grossly normal; gait and station normal; digits and nails normal  Skin:  No rashes, petechiae, cyanosis  Neurologic: without focal signs; oriented to person, time, place, and purpose; judgement intact      Medical Decision Making   Assessment and Plan  Phuc Mcdowell is a 77 y.o.male  with type 2 diabetes mellitus, psoriatic arthritis, psoriasis, hyperlipidemia, and REM behavior disorder.  Presents today to follow-up regarding management of her behavior disorder.    The medical record was reviewed.    REM behavior disorder  Patient currently using 1 mg Klonopin at 10 mg melatonin nightly.  Patient is having nightly behaviors related to sleep.  He has tried higher doses of Klonopin in the past which have caused daytime grogginess.  He is wondering if 1.5 mg this will cause grogginess or improve his behaviors.  He is open to trying a increased dosage.  He is continue to implement better precautions.    Plan  -Increase Klonopin to 1.5 mg nightly  -Continue melatonin 10 mg nightly  -Follow-up 6 months  -Advised to message us in [employer] if any concerns or questions arise prior to next visit    Have advised the patient to follow up with the appropriate healthcare practitioners for all other medical problems and issues.    Return in about 6 months (around 4/4/2024).      Please note portions of this record was created using voice recognition software. I have made " every reasonable attempt to correct obvious errors, but I expect that there are errors of grammar and possibly content I did not discover before finalizing the note.

## 2023-10-11 DIAGNOSIS — E78.2 MIXED HYPERLIPIDEMIA: ICD-10-CM

## 2023-10-11 RX ORDER — PRAVASTATIN SODIUM 20 MG
20 TABLET ORAL EVERY EVENING
Qty: 90 TABLET | Refills: 0 | Status: SHIPPED | OUTPATIENT
Start: 2023-10-11 | End: 2024-01-18 | Stop reason: SDUPTHER

## 2023-10-17 DIAGNOSIS — E11.9 TYPE 2 DIABETES MELLITUS WITHOUT COMPLICATION, WITHOUT LONG-TERM CURRENT USE OF INSULIN (HCC): Chronic | ICD-10-CM

## 2023-10-23 ASSESSMENT — RHEUMATOLOGY FOLLOW-UP QUESTIONNAIRE
STICKING IN THROAT: Y
HEADACHES: Y
DRY MOUTH: Y
SHORTNESS OF BREATH: Y
FATIGUE: Y
JOINT PAIN: SAME WITH ACTIVITY
RATE_1TO10: 4
ALLEVIATING_FACTORS: 4
CHIEF_COMPLAINT: CHECK-UP
MARK ALL THE AREAS OF PAIN: 92377826
DURATION: 30-60 MINS
DRY COUGH: Y
DRY EYES: Y
CHARACTERISTIC: SAME WITH ACTIVITY
NASAL CONGESTION: Y
SINUS PAIN: Y
DIFFICULTY SWALLOWING: Y

## 2023-10-25 ENCOUNTER — OFFICE VISIT (OUTPATIENT)
Dept: RHEUMATOLOGY | Facility: MEDICAL CENTER | Age: 78
End: 2023-10-25
Attending: STUDENT IN AN ORGANIZED HEALTH CARE EDUCATION/TRAINING PROGRAM
Payer: MEDICARE

## 2023-10-25 VITALS
HEIGHT: 67 IN | TEMPERATURE: 97.6 F | DIASTOLIC BLOOD PRESSURE: 64 MMHG | BODY MASS INDEX: 21.27 KG/M2 | HEART RATE: 93 BPM | WEIGHT: 135.5 LBS | OXYGEN SATURATION: 97 % | SYSTOLIC BLOOD PRESSURE: 124 MMHG

## 2023-10-25 DIAGNOSIS — D84.9 IMMUNOSUPPRESSED STATUS (HCC): ICD-10-CM

## 2023-10-25 DIAGNOSIS — L40.0 PLAQUE PSORIASIS: ICD-10-CM

## 2023-10-25 DIAGNOSIS — L40.50 PSORIATIC ARTHRITIS (HCC): ICD-10-CM

## 2023-10-25 PROCEDURE — 3078F DIAST BP <80 MM HG: CPT | Performed by: STUDENT IN AN ORGANIZED HEALTH CARE EDUCATION/TRAINING PROGRAM

## 2023-10-25 PROCEDURE — 99214 OFFICE O/P EST MOD 30 MIN: CPT | Performed by: STUDENT IN AN ORGANIZED HEALTH CARE EDUCATION/TRAINING PROGRAM

## 2023-10-25 PROCEDURE — 3074F SYST BP LT 130 MM HG: CPT | Performed by: STUDENT IN AN ORGANIZED HEALTH CARE EDUCATION/TRAINING PROGRAM

## 2023-10-25 PROCEDURE — 99212 OFFICE O/P EST SF 10 MIN: CPT | Performed by: STUDENT IN AN ORGANIZED HEALTH CARE EDUCATION/TRAINING PROGRAM

## 2023-10-25 ASSESSMENT — FIBROSIS 4 INDEX: FIB4 SCORE: 1.71

## 2023-10-25 NOTE — PROGRESS NOTES
[facility] RHEUMATOLOGY  [address], [address], [place] [address]  Phone: [phone] ? Fax: [phone]  Website: [facility].Robert Applebaum MD/Health-Services/Rheumatology    FOLLOW-UP VISIT NOTE      DATE OF SERVICE: 10/25/2023         Subjective     PRIMARY CARE PRACTITIONER:  Raul Low M.D.  [address] [address]    PATIENT IDENTIFICATION:  Phuc Mcdowell  [address] [address]    YOB: 1945    MEDICAL RECORD NUMBER: [id number]          CHIEF COMPLAINT:   Chief Complaint   Patient presents with    Follow-Up     Psoriatic arthritis (HCC)       RHEUMATOLOGIC HISTORY:  Phuc Mcdowell is a 77 y.o. 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 Previously under the care of a rheumatologist in [facility] prior to relocating to [place], he initially presented on 4/19/2022 to establish care. Reported minimal joint pain in his hands (mostly DIP joints) toward the end of infliximab infusion cycle, but no new or worsening skin plaques. His joint pain was sometimes associated with 30 minutes of morning stiffness that improved with activity. Reported some weight loss due to changes he made in his diet and physical activity, but otherwise doing well.     Pertinent treatments: Cyclosporine 100 mg daily (from 1990s-4/2022, deemed unnecessary), methotrexate 20>7.5>15mg oral weekly with folic acid 1 mg daily (from 1990s, dose increased 4/2022-present, effective), infliximab 600>300 mg IV every 8 weeks (started 2000s, dose decreased 6/2023-present, effective).     Pertinent negative labs: Negative HBV and QTB (in 5/2022), CRP, ESR, eGFR, creatinine, and LFTs (in 4/2023), and unremarkable CBC (in 8/2023).     Pertinent XR imaging: Pelvis (in 11/2021) with bilateral mild SI joint arthritis with partial osseous fusion. Shoulders (in  11/2021) with mild osteoarthritis of bilateral AC joints and left GH joint. Left elbow (in 7/2022) with no evidence of arthropathy.      INTERVAL HISTORY:  Reports interval history as noted on the questionnaire below or scanned under media tab.  [facility] Established Patient History Form    10/23/2023 11:48 AM PDT - Filed by Patient   MAIN REASON FOR VISIT check-up   INTERVAL HISTORY OF ILLNESS   Date of worsening onset:    Preceding incident/ailment:    Describe/list your symptoms: stiffness, pain in joints   Exacerbating factors:    Alleviating factors: 4   Helpful medications: remicaid, methotraxiate, tylenol   Ineffective medications:    Severity of pain (scale of 1-10): 4   Personal/emotional stressors:    Josiah All The Areas Of Pain    REVIEW OF SYMPTOMS    General   Fevers    Chills    Night sweats    Malaise    Fatigue Yes   Unintentional weight loss    Musculoskeletal   Joint pain Same with activity   Morning stiffness duration 30-60 mins   Morning stiffness characteristic Same with activity   Joint swelling    Joint instability    Tendon pain    Muscle pain    Body aches    Dermatologic   Hair loss with bald spots    Hair shedding    Skin thickening    Skin plaques    Sunlight-induced skin rash    Cold-induced color changes (white, purple, red on rewarming)    Neurologic/Psychiatric   Weakness    Spasms    Tingling    Burning    Numbness    Insomnia    Anxiety    Depression    Head/Eyes   Headaches Yes   Temple pain    Dizziness    Dry eyes Yes   Eye pain    Eye redness    Blurry vision    Vision loss    Ears/Nose   Ear pain    Ringing in ears    Vertigo    Hearing loss    Nasal ulcers    Nosebleeds    Sinus pain Yes   Nasal congestion Yes   Snoring    Mouth/Throat   Oral ulcers    Bleeding gums    Dry mouth Yes   Cavities    Sore throat    Sticking in throat Yes   Difficulty speaking    Neck/Lymphatics   Thyroid pain    Thyroid swelling    Lymph node swelling    Cardiac/Respiratory   Chest pain with  breathing    Dry cough Yes   Cough with bloody phlegm    Shortness of breath Yes   Fast heartbeats    Irregular heartbeats    Gastrointestinal   Nausea    Vomiting    Difficulty swallowing Yes   Heartburn    Abdominal pain    Bloody stool    Mucus stool    Genitourinary   Pelvic pain    Genital ulcers    Abnormal discharge    Burning urination    Frothy urine    Blood in urine        REVIEW OF SYSTEMS:  Except as noted in the history above, relevant review of systems with emphasis on autoimmune rheumatic diseases was otherwise negative.      ACTIVE PROBLEM LIST:  Patient Active Problem List    Diagnosis Date Noted    Need for vaccination 09/05/2023    Dyspnea on exertion 09/05/2023    Gastroesophageal reflux disease without esophagitis 05/02/2023    Immunosuppressed status ([facility]) 04/25/2023    Primary osteoarthritis of both shoulders 04/25/2023    Chronic pain of left elbow 07/26/2022    Plaque psoriasis 04/19/2022    Long-term use of immunosuppressant medication 04/17/2022    Dyslipidemia due to type 2 diabetes mellitus ([facility]) 04/12/2022    REM sleep behavior disorder 04/11/2022    Psoriatic arthritis ([facility]) 04/11/2022    Type 2 diabetes mellitus with hyperlipidemia ([facility]) 04/11/2022       PAST MEDICAL HISTORY:  Past Medical History:   Diagnosis Date    Back pain     Back pain     Chickenpox     Diabetes ([facility])     Double vision     Frequent urination     Heartburn     Hyperlipidemia     Influenza     Mumps     Nasal drainage     Painful joint     Psoriatic arthritis ([facility])     Rash     REM sleep behavior disorder     Rheumatoid arthritis ([facility])     Sleep apnea     Sore muscles     Tonsillitis     Wears glasses        PAST SURGICAL HISTORY:  Past Surgical History:   Procedure Laterality Date    CATARACT EXTRACTION WITH IOL Bilateral 2016    [place] REMV 2ND CATARACT,CORN-SCLER SECTN         MEDICATIONS:  Current Outpatient Medications   Medication Sig    metformin (GLUCOPHAGE) 1000 MG tablet Take 1 Tablet by mouth 2 times a  day with meals.    pravastatin (PRAVACHOL) 20 MG Tab TAKE ONE TABLET BY MOUTH EVERY EVENING    clonazePAM (KLONOPIN) 0.5 MG Tab Take 3 Tablets by mouth every evening for 90 days. Indications: Disorder of Rapid Eye Movement Period of Sleep    folic acid (FOLVITE) 1 MG Tab Take 1 Tablet by mouth every day. Except the day of methotrexate.    Loratadine (KLS ALLERCLEAR PO)     clonazePAM (KLONOPIN) 0.5 MG Tab Take  by mouth 2 times a day.    methotrexate 2.5 MG Tab TAKE 6 TABLETS BY MOUTH EVERY 7 DAYS    glimepiride (AMARYL) 2 MG Tab Take 1 Tablet by mouth 2 times a day.    omeprazole (PRILOSEC) 20 MG delayed-release capsule Take 1 Capsule by mouth every day.    inFLIXimab (REMICADE) 100 MG Recon Soln Infuse 600 mg into a venous catheter every 8 weeks.    therapeutic multivitamin-minerals (THERAGRAN-M) Tab Take 1 Tablet by mouth every day.    aspirin (ASA) 81 MG Chew Tab chewable tablet Chew 1 Tablet every day.    Melatonin 10 MG Cap Take 20 mg by mouth every evening.    Acetaminophen (TYLENOL ARTHRITIS PAIN PO) Take 1,250 mg by mouth every day.       ALLERGIES:   No Known Allergies    IMMUNIZATIONS:  Immunization History   Administered Date(s) Administered    Influenza Vaccine Adult HD 10/04/2022    Influenza Vaccine Quad Inj (Preserved) 10/04/2020    MODERNA SARS-COV-2 VACCINE (12+) 01/30/2021, 03/03/2021, 08/20/2021, 10/04/2022    Pneumococcal Conjugate Vaccine (Prevnar/PCV-13) 01/01/2014       SOCIAL HISTORY:   Social History     Socioeconomic History    Marital status:     Highest education level: Bachelor's degree (e.g., BA, AB, BS)   Tobacco Use    Smoking status: Former    Smokeless tobacco: Never    Tobacco comments:     quit in 1994   Vaping Use    Vaping Use: Never used   Substance and Sexual Activity    Alcohol use: Yes     Alcohol/week: 1.2 oz     Types: 2 Glasses of wine per week     Comment: occasional glass of wine    Drug use: Never    Sexual activity: Yes     Partners: Female     Birth  "control/protection: None     Social Determinants of Health     Financial Resource Strain: Low Risk  (2022)    Overall Financial Resource Strain (CARDIA)     Difficulty of Paying Living Expenses: Not very hard   Food Insecurity: No Food Insecurity (2022)    Hunger Vital Sign     Worried About Running Out of Food in the Last Year: Never true     Ran Out of Food in the Last Year: Never true   Transportation Needs: No Transportation Needs (2022)    PRAPARE - Transportation     Lack of Transportation (Medical): No     Lack of Transportation (Non-Medical): No   Physical Activity: Sufficiently Active (2022)    Exercise Vital Sign     Days of Exercise per Week: 3 days     Minutes of Exercise per Session: 60 min   Stress: No Stress Concern Present (2022)    [employer] - Occupational Stress Questionnaire     Feeling of Stress : Not at all   Housing Stability: Low Risk  (2022)    Housing Stability Vital Sign     Unable to Pay for Housing in the Last Year: No     Number of Places Lived in the Last Year: 2     Unstable Housing in the Last Year: No       FAMILY HISTORY:  Family History   Problem Relation Age of Onset    Cancer Mother         lung and brain -  at 88    Heart Disease Father         passed at 62    Hypertension Sister     Hypertension Brother     Cancer Brother             Objective     Vital Signs: /64 (BP Location: Left arm, Patient Position: Sitting, BP Cuff Size: Adult)   Pulse 93   Temp 36.4 °C (97.6 °F) (Temporal)   Ht 1.702 m (5' 7\")   Wt 61.5 kg (135 lb 8 oz)   SpO2 97% Body mass index is 21.22 kg/m².    General: Appears well and comfortable  Eyes: No scleral or conjunctival lesions  ENT: No apparent oral or nasal lesions  Head/Neck: No apparent scalp or neck lesions  Cardiovascular: Regular rate and rhythm  Respiratory: Breathing quiet and unlabored  Gastrointestinal: No apparent organomegaly or abdominal masses  Integumentary: No " significant cutaneous lesions or dyspigmentation  Musculoskeletal: No significant joint tenderness, periarticular soft tissue swelling, warmth, erythema, or overt synovitis; no significant restriction in range of motion of joints examined  Neurologic: No focal sensory or motor deficits  Psychiatric: Mood and affect appropriate      LABORATORY RESULTS REVIEWED AND INTERPRETED BY ME:  Lab Results   Component Value Date/Time    CREACTPROT <0.30 04/26/2023 08:34 AM    SEDRATEWES 7 04/26/2023 08:34 AM     Lab Results   Component Value Date/Time    WBC 8.5 08/01/2023 01:57 PM    RBC 4.28 (L) 08/01/2023 01:57 PM    HEMOGLOBIN 13.4 (L) 08/01/2023 01:57 PM    HEMATOCRIT 39.4 (L) 08/01/2023 01:57 PM    MCV 92.1 08/01/2023 01:57 PM    MCH 31.3 08/01/2023 01:57 PM    MCHC 34.0 08/01/2023 01:57 PM    RDW 47.4 08/01/2023 01:57 PM    PLATELETCT 186 08/01/2023 01:57 PM    MPV 9.5 08/01/2023 01:57 PM    NEUTS 4.79 08/01/2023 01:57 PM    LYMPHOCYTES 30.80 08/01/2023 01:57 PM    MONOCYTES 7.90 08/01/2023 01:57 PM    EOSINOPHILS 4.40 08/01/2023 01:57 PM    BASOPHILS 0.20 08/01/2023 01:57 PM     Lab Results   Component Value Date/Time    ASTSGOT 24 04/26/2023 08:34 AM    ALTSGPT 32 08/29/2023 09:14 AM    ALKPHOSPHAT 63 04/26/2023 08:34 AM    TBILIRUBIN 0.7 04/26/2023 08:34 AM    TOTPROTEIN 7.9 04/26/2023 08:34 AM    ALBUMIN 4.5 04/26/2023 08:34 AM     Lab Results   Component Value Date/Time    SODIUM 139 04/26/2023 08:34 AM    POTASSIUM 5.4 04/26/2023 08:34 AM    CHLORIDE 103 04/26/2023 08:34 AM    CO2 24 04/26/2023 08:34 AM    GLUCOSE 174 (H) 04/26/2023 08:34 AM    BUN 24 (H) 04/26/2023 08:34 AM    CREATININE 1.20 04/26/2023 08:34 AM    CALCIUM 10.4 04/26/2023 08:34 AM     Lab Results   Component Value Date/Time    MICROALBUR 1.9 08/29/2023 09:15 AM    MALBCRT 25 08/29/2023 09:15 AM     Lab Results   Component Value Date/Time    HEPBSAG Non-Reactive 05/06/2022 02:18 PM    HEPBCORTOT NonReactive 05/06/2022 02:18 PM     Lab Results    Component Value Date/Time    CHOLSTRLTOT 121 08/29/2023 09:14 AM    LDL 54 08/29/2023 09:14 AM    HDL 42 08/29/2023 09:14 AM    TRIGLYCERIDE 127 08/29/2023 09:14 AM    HBA1C 8.0 (H) 08/29/2023 09:14 AM       RADIOLOGY RESULTS REVIEWED AND INTERPRETED BY ME:  Results for orders placed in visit on 04/12/22    DX-SHOULDER 2+    Results for orders placed in visit on 04/12/22    DX-PELVIS-1 OR 2 VIEWS      All relevant laboratory and imaging results reported on this note were reviewed and interpreted by me.         Assessment & Plan     Phuc Mcdowell is a 77 y.o. male with history as noted above whose presentation merits the following clinical impressions and recommendations:    1. Psoriatic arthritis (HCC)  Clinically quiescent disease that remains well-controlled on the current regimen of methotrexate and infliximab. Prior to his next visit, reasonable to reassess his immunologic profile for risk stratification purposes prior to further de-escalation of his immunosuppression.  - RHEUMATOID ARTHRITIS PANEL (RF, anti-CCP, and anti-CarP); Future  - Miscellaneous Test (infliximab neutralizing antibody); Future  - CRP QUANTITIVE (NON-CARDIAC); Future  - Sed Rate; Future  - Continue methotrexate 15 mg oral weekly with folic acid 1 mg daily except the day of methotrexate  - Continue infliximab 300 mg IV infusion every 8 weeks  - Consider further de-escalation of immunosuppression if this remains quiescent at follow-up visit    2. Plaque psoriasis  Clinically quiescent disease that remains well-controlled on the current regimen of methotrexate and infliximab.  - CRP QUANTITIVE (NON-CARDIAC); Future  - Sed Rate; Future  - Continue methotrexate 15 mg oral weekly with folic acid 1 mg daily except the day of methotrexate  - Continue infliximab 300 mg IV infusion every 8 weeks    3. Immunosuppressed status (HCC)  Presently with no history, physical, or laboratory evidence to suggest significant adverse drug effects or  opportunistic infections, but need routine monitoring per guidelines.  - CBC WITH DIFFERENTIAL; Future  - Comp Metabolic Panel; Future  - Miscellaneous Test (infliximab neutralizing antibody); Future  - Need to ascertain age-appropriate vaccines in addition to the ones listed above under immunizations      The above assessment and plan were discussed with the patient who acknowledged understanding of the plan.    FOLLOW-UP: Return in about 6 months (around 4/25/2024) for Short.         Thank you for the opportunity to participate in the care of Phuc Mcdowell.    Piero Lewis MD, MS, FACR  Rheumatologist, [facility] Rheumatology ? [facility]   of Clinical Medicine, Department of Internal Medicine  Formerly [facility] ? [employer]

## 2023-11-02 ENCOUNTER — HOSPITAL ENCOUNTER (OUTPATIENT)
Dept: LAB | Facility: MEDICAL CENTER | Age: 78
End: 2023-11-02
Attending: STUDENT IN AN ORGANIZED HEALTH CARE EDUCATION/TRAINING PROGRAM
Payer: MEDICARE

## 2023-11-02 ENCOUNTER — APPOINTMENT (RX ONLY)
Dept: URBAN - METROPOLITAN AREA CLINIC 22 | Facility: CLINIC | Age: 78
Setting detail: DERMATOLOGY
End: 2023-11-02

## 2023-11-02 DIAGNOSIS — L40.0 PLAQUE PSORIASIS: ICD-10-CM

## 2023-11-02 DIAGNOSIS — D18.0 HEMANGIOMA: ICD-10-CM

## 2023-11-02 DIAGNOSIS — L40.50 PSORIATIC ARTHRITIS (HCC): ICD-10-CM

## 2023-11-02 DIAGNOSIS — L82.1 OTHER SEBORRHEIC KERATOSIS: ICD-10-CM

## 2023-11-02 DIAGNOSIS — L57.0 ACTINIC KERATOSIS: ICD-10-CM

## 2023-11-02 DIAGNOSIS — Z71.89 OTHER SPECIFIED COUNSELING: ICD-10-CM

## 2023-11-02 DIAGNOSIS — L40.59 OTHER PSORIATIC ARTHROPATHY: ICD-10-CM

## 2023-11-02 DIAGNOSIS — D84.9 IMMUNOSUPPRESSED STATUS (HCC): ICD-10-CM

## 2023-11-02 DIAGNOSIS — L40.0 PSORIASIS VULGARIS: ICD-10-CM

## 2023-11-02 DIAGNOSIS — D22 MELANOCYTIC NEVI: ICD-10-CM

## 2023-11-02 DIAGNOSIS — L81.4 OTHER MELANIN HYPERPIGMENTATION: ICD-10-CM

## 2023-11-02 PROBLEM — D18.01 HEMANGIOMA OF SKIN AND SUBCUTANEOUS TISSUE: Status: ACTIVE | Noted: 2023-11-02

## 2023-11-02 PROBLEM — D22.5 MELANOCYTIC NEVI OF TRUNK: Status: ACTIVE | Noted: 2023-11-02

## 2023-11-02 LAB
ALBUMIN SERPL BCP-MCNC: 4.2 G/DL (ref 3.2–4.9)
ALBUMIN/GLOB SERPL: 1.3 G/DL
ALP SERPL-CCNC: 62 U/L (ref 30–99)
ALT SERPL-CCNC: 18 U/L (ref 2–50)
ANION GAP SERPL CALC-SCNC: 11 MMOL/L (ref 7–16)
AST SERPL-CCNC: 19 U/L (ref 12–45)
BASOPHILS # BLD AUTO: 0.6 % (ref 0–1.8)
BASOPHILS # BLD: 0.04 K/UL (ref 0–0.12)
BILIRUB SERPL-MCNC: 0.3 MG/DL (ref 0.1–1.5)
BUN SERPL-MCNC: 22 MG/DL (ref 8–22)
CALCIUM ALBUM COR SERPL-MCNC: 9.1 MG/DL (ref 8.5–10.5)
CALCIUM SERPL-MCNC: 9.3 MG/DL (ref 8.5–10.5)
CHLORIDE SERPL-SCNC: 103 MMOL/L (ref 96–112)
CO2 SERPL-SCNC: 25 MMOL/L (ref 20–33)
CREAT SERPL-MCNC: 1.24 MG/DL (ref 0.5–1.4)
CRP SERPL HS-MCNC: 0.5 MG/DL (ref 0–0.75)
EOSINOPHIL # BLD AUTO: 0.13 K/UL (ref 0–0.51)
EOSINOPHIL NFR BLD: 2 % (ref 0–6.9)
ERYTHROCYTE [DISTWIDTH] IN BLOOD BY AUTOMATED COUNT: 50.1 FL (ref 35.9–50)
GFR SERPLBLD CREATININE-BSD FMLA CKD-EPI: 60 ML/MIN/1.73 M 2
GLOBULIN SER CALC-MCNC: 3.2 G/DL (ref 1.9–3.5)
GLUCOSE SERPL-MCNC: 112 MG/DL (ref 65–99)
HCT VFR BLD AUTO: 40.7 % (ref 42–52)
HGB BLD-MCNC: 13.3 G/DL (ref 14–18)
IMM GRANULOCYTES # BLD AUTO: 0.01 K/UL (ref 0–0.11)
IMM GRANULOCYTES NFR BLD AUTO: 0.2 % (ref 0–0.9)
LYMPHOCYTES # BLD AUTO: 2.23 K/UL (ref 1–4.8)
LYMPHOCYTES NFR BLD: 35 % (ref 22–41)
MCH RBC QN AUTO: 31.5 PG (ref 27–33)
MCHC RBC AUTO-ENTMCNC: 32.7 G/DL (ref 32.3–36.5)
MCV RBC AUTO: 96.4 FL (ref 81.4–97.8)
MONOCYTES # BLD AUTO: 0.77 K/UL (ref 0–0.85)
MONOCYTES NFR BLD AUTO: 12.1 % (ref 0–13.4)
NEUTROPHILS # BLD AUTO: 3.2 K/UL (ref 1.82–7.42)
NEUTROPHILS NFR BLD: 50.1 % (ref 44–72)
NRBC # BLD AUTO: 0 K/UL
NRBC BLD-RTO: 0 /100 WBC (ref 0–0.2)
PLATELET # BLD AUTO: 220 K/UL (ref 164–446)
PMV BLD AUTO: 9.8 FL (ref 9–12.9)
POTASSIUM SERPL-SCNC: 4.6 MMOL/L (ref 3.6–5.5)
PROT SERPL-MCNC: 7.4 G/DL (ref 6–8.2)
RBC # BLD AUTO: 4.22 M/UL (ref 4.7–6.1)
SODIUM SERPL-SCNC: 139 MMOL/L (ref 135–145)
WBC # BLD AUTO: 6.4 K/UL (ref 4.8–10.8)

## 2023-11-02 PROCEDURE — 85025 COMPLETE CBC W/AUTO DIFF WBC: CPT

## 2023-11-02 PROCEDURE — 82397 CHEMILUMINESCENT ASSAY: CPT

## 2023-11-02 PROCEDURE — 85652 RBC SED RATE AUTOMATED: CPT

## 2023-11-02 PROCEDURE — 86140 C-REACTIVE PROTEIN: CPT

## 2023-11-02 PROCEDURE — 80053 COMPREHEN METABOLIC PANEL: CPT

## 2023-11-02 PROCEDURE — 80230 DRUG ASSAY INFLIXIMAB: CPT

## 2023-11-02 PROCEDURE — ? ADDITIONAL NOTES

## 2023-11-02 PROCEDURE — ? LIQUID NITROGEN

## 2023-11-02 PROCEDURE — 83516 IMMUNOASSAY NONANTIBODY: CPT

## 2023-11-02 PROCEDURE — 17000 DESTRUCT PREMALG LESION: CPT

## 2023-11-02 PROCEDURE — ? COUNSELING

## 2023-11-02 PROCEDURE — 36415 COLL VENOUS BLD VENIPUNCTURE: CPT

## 2023-11-02 PROCEDURE — ? SUNSCREEN RECOMMENDATIONS

## 2023-11-02 PROCEDURE — 86431 RHEUMATOID FACTOR QUANT: CPT

## 2023-11-02 PROCEDURE — 86200 CCP ANTIBODY: CPT

## 2023-11-02 PROCEDURE — 99213 OFFICE O/P EST LOW 20 MIN: CPT | Mod: 25

## 2023-11-02 ASSESSMENT — LOCATION SIMPLE DESCRIPTION DERM
LOCATION SIMPLE: LEFT FOREARM
LOCATION SIMPLE: RIGHT LOWER BACK
LOCATION SIMPLE: LEFT UPPER BACK
LOCATION SIMPLE: CHEST
LOCATION SIMPLE: LEFT FOREHEAD

## 2023-11-02 ASSESSMENT — LOCATION DETAILED DESCRIPTION DERM
LOCATION DETAILED: LEFT SUPERIOR MEDIAL FOREHEAD
LOCATION DETAILED: RIGHT SUPERIOR LATERAL MIDBACK
LOCATION DETAILED: LEFT PROXIMAL DORSAL FOREARM
LOCATION DETAILED: LEFT INFERIOR UPPER BACK
LOCATION DETAILED: RIGHT MEDIAL INFERIOR CHEST

## 2023-11-02 ASSESSMENT — LOCATION ZONE DERM
LOCATION ZONE: ARM
LOCATION ZONE: FACE
LOCATION ZONE: TRUNK

## 2023-11-02 ASSESSMENT — BSA PSORIASIS: % BODY COVERED IN PSORIASIS: 0

## 2023-11-02 ASSESSMENT — PGA PSORIASIS: PGA PSORIASIS 2020: CLEAR

## 2023-11-02 NOTE — PROCEDURE: LIQUID NITROGEN
Show Applicator Variable?: Yes
Render Post-Care Instructions In Note?: no
Post-Care Instructions: I reviewed with the patient in detail post-care instructions. Patient is to wear sunprotection, and avoid picking at any of the treated lesions. Pt may apply Vaseline to crusted or scabbing areas.
Duration Of Freeze Thaw-Cycle (Seconds): 3
Number Of Freeze-Thaw Cycles: 2 freeze-thaw cycles
Detail Level: Detailed
Consent: The patient's consent was obtained including but not limited to risks of crusting, scabbing, blistering, scarring, darker or lighter pigmentary change, recurrence, incomplete removal and infection.

## 2023-11-02 NOTE — PROCEDURE: ADDITIONAL NOTES
Additional Notes: Patient has hx of psoriasis and psoriatic arthritis.   He was is on MTX and remicaide, he is doing this under the direction of Dr. Pruett    He is currently stable and does not require any topical therapy.
Detail Level: Simple
Render Risk Assessment In Note?: no
Additional Notes: Patient has had this lesion for 10+ years and states it does not change.  Photos taken today. Will monitor.  Wife also monitors.

## 2023-11-03 LAB — ERYTHROCYTE [SEDIMENTATION RATE] IN BLOOD BY WESTERGREN METHOD: 17 MM/HOUR (ref 0–20)

## 2023-11-05 LAB
CARBAMYLATED PROTEIN ANTIBODY, IGG Q6043: 18 UNITS (ref 0–19)
CCP IGA+IGG SERPL IA-ACNC: 5 UNITS (ref 0–19)
RHEUMATOID FACT SER NEPH-ACNC: <10 IU/ML (ref 0–14)

## 2023-11-06 LAB — TEST NAME 95000: NORMAL

## 2023-11-10 ENCOUNTER — TELEPHONE (OUTPATIENT)
Dept: CARDIOLOGY | Facility: MEDICAL CENTER | Age: 78
End: 2023-11-10
Payer: MEDICARE

## 2023-11-10 NOTE — TELEPHONE ENCOUNTER
Called patient to request records for NP appointment with . Called to confirm if this will be first time patient is seeing a cardiologist. No answer, left voicemail to call back.

## 2023-11-14 ENCOUNTER — OFFICE VISIT (OUTPATIENT)
Dept: CARDIOLOGY | Facility: MEDICAL CENTER | Age: 78
End: 2023-11-14
Attending: INTERNAL MEDICINE
Payer: MEDICARE

## 2023-11-14 VITALS
BODY MASS INDEX: 22.31 KG/M2 | HEART RATE: 88 BPM | SYSTOLIC BLOOD PRESSURE: 126 MMHG | OXYGEN SATURATION: 96 % | RESPIRATION RATE: 16 BRPM | DIASTOLIC BLOOD PRESSURE: 72 MMHG | HEIGHT: 66 IN | WEIGHT: 138.8 LBS

## 2023-11-14 DIAGNOSIS — I10 HYPERTENSION, UNSPECIFIED TYPE: ICD-10-CM

## 2023-11-14 DIAGNOSIS — E11.69 TYPE 2 DIABETES MELLITUS WITH HYPERLIPIDEMIA (HCC): Chronic | ICD-10-CM

## 2023-11-14 DIAGNOSIS — R06.09 DYSPNEA ON EXERTION: ICD-10-CM

## 2023-11-14 DIAGNOSIS — E78.5 TYPE 2 DIABETES MELLITUS WITH HYPERLIPIDEMIA (HCC): Chronic | ICD-10-CM

## 2023-11-14 LAB — EKG IMPRESSION: NORMAL

## 2023-11-14 PROCEDURE — 99204 OFFICE O/P NEW MOD 45 MIN: CPT | Performed by: INTERNAL MEDICINE

## 2023-11-14 PROCEDURE — 99213 OFFICE O/P EST LOW 20 MIN: CPT | Performed by: INTERNAL MEDICINE

## 2023-11-14 PROCEDURE — 3074F SYST BP LT 130 MM HG: CPT | Performed by: INTERNAL MEDICINE

## 2023-11-14 PROCEDURE — 3078F DIAST BP <80 MM HG: CPT | Performed by: INTERNAL MEDICINE

## 2023-11-14 PROCEDURE — 93010 ELECTROCARDIOGRAM REPORT: CPT | Performed by: INTERNAL MEDICINE

## 2023-11-14 PROCEDURE — 93005 ELECTROCARDIOGRAM TRACING: CPT | Performed by: INTERNAL MEDICINE

## 2023-11-14 ASSESSMENT — FIBROSIS 4 INDEX: FIB4 SCORE: 1.53

## 2023-11-14 NOTE — PROGRESS NOTES
[facility]    PatientName:Phuc Hoang ShippDate: 2023  :1945    77 y.o.PCP:Raul Low M.D.  MRN:[id number]          Problems and Plans    Dyspnea on exertion  Clinically, he is doing well and his main concern is that he has some dizziness with occasional conversational dyspnea.  He is noted that his exercise tolerance has increased and he is not having exertional dyspnea complaints.  I have recommended that he undergo an echocardiogram to assess LV systolic function as well as diastolic parameters.  This is more concerning given that he has had a cough with subsequent phlegm production that is more of a white sputum.  We will make further recommendations regarding his echocardiographic evaluation.    Type 2 diabetes mellitus with hyperlipidemia (HCC)  Currently on pravastatin therapy with noted type 2 diabetes mellitus would not be unreasonable to consider transition to moderate intensity statin therapy such as atorvastatin or rosuvastatin.  I will defer management to his primary care provider    Return in about 3 months (around 2024).      Encounter    Reason for Visit / Chief Complaint: Dyspnea on exertion    HPI    77-year-old male with known history of psoriatic arthritis with immunosuppression, sleep disorder behavior, dyslipidemia, type 2 diabetes mellitus, gastroesophageal reflux disease presents in clinic for evaluation of dyspnea on exertion    Currently, he notes he has been feeling well but ultimately saw his general practitioner approximately 2 months ago and was noted to have some dizziness and mentioned that he had had some exertional dyspnea that was very infrequent.  During that time he had an EKG which was noted to be normal and his blood work in terms of a hemoglobin A1c was noted to be approximately 8.  He has started to walk for exercise but has noted some mild conversational dyspnea.  He also has a cough with some productive phlegm.   He denies any cardiovascular history.  And he feels that his dizziness has significantly improved.  He is accompanied by his wife who reiterates similar history.  He has been currently taking his medications for his psoriatic arthritis.  Past Medical History  Past Medical History:   Diagnosis Date    Back pain     Back pain     Chickenpox     Diabetes (HCC)     Double vision     Frequent urination     Heartburn     Hyperlipidemia     Influenza     Mumps     Nasal drainage     Painful joint     Psoriatic arthritis (HCC)     Rash     REM sleep behavior disorder     Rheumatoid arthritis (HCC)     Sleep apnea     Sore muscles     Tonsillitis     Wears glasses      Past Surgical History  Past Surgical History:   Procedure Laterality Date    CATARACT EXTRACTION WITH IOL Bilateral 2016    UT REMV 2ND CATARACT,CORN-SCLER SECTN       Social History  Social History     Socioeconomic History    Marital status:      Spouse name: Not on file    Number of children: Not on file    Years of education: Not on file    Highest education level: Bachelor's degree (e.g., BA, AB, BS)   Occupational History    Not on file   Tobacco Use    Smoking status: Former    Smokeless tobacco: Never    Tobacco comments:     quit in 1994   Vaping Use    Vaping Use: Never used   Substance and Sexual Activity    Alcohol use: Yes     Alcohol/week: 1.2 oz     Types: 2 Glasses of wine per week     Comment: occasional glass of wine    Drug use: Never    Sexual activity: Yes     Partners: Female     Birth control/protection: None   Other Topics Concern    Not on file   Social History Narrative    Not on file     Social Determinants of Health     Financial Resource Strain: Low Risk  (11/1/2022)    Overall Financial Resource Strain (CARDIA)     Difficulty of Paying Living Expenses: Not very hard   Food Insecurity: No Food Insecurity (11/1/2022)    Hunger Vital Sign     Worried About Running Out of Food in the Last Year: Never true     Ran Out of Food in  "the Last Year: Never true   Transportation Needs: No Transportation Needs (2022)    PRAPARE - Transportation     Lack of Transportation (Medical): No     Lack of Transportation (Non-Medical): No   Physical Activity: Sufficiently Active (2022)    Exercise Vital Sign     Days of Exercise per Week: 3 days     Minutes of Exercise per Session: 60 min   Stress: No Stress Concern Present (2022)    [employer] - Occupational Stress Questionnaire     Feeling of Stress : Not at all   Social Connections: Not on file   Intimate Partner Violence: Not on file   Housing Stability: Low Risk  (2022)    Housing Stability Vital Sign     Unable to Pay for Housing in the Last Year: No     Number of Places Lived in the Last Year: 2     Unstable Housing in the Last Year: No     Past Family History  Family History   Problem Relation Age of Onset    Cancer Mother         lung and brain -  at 88    Heart Disease Father         passed at 62    Hypertension Sister     Hypertension Brother     Cancer Brother      Medication(s)  [unfilled]  Allergies  Patient has no known allergies.    Review of Systems    A comprehensive 10 system review was conducted and is negative except as noted above in the HPI or here.      Vital Signs  /72 (BP Location: Left arm, Patient Position: Sitting, BP Cuff Size: Adult)   Pulse 88   Resp 16   Ht 1.68 m (5' 6.14\")   Wt 63 kg (138 lb 12.8 oz)   SpO2 96%   BMI 22.31 kg/m²     Physical Exam  Constitutional:       Appearance: Normal appearance. He is obese.   HENT:      Head: Normocephalic and atraumatic.      Mouth/Throat:      Mouth: Mucous membranes are moist.      Pharynx: Oropharynx is clear.   Eyes:      Extraocular Movements: Extraocular movements intact.      Conjunctiva/sclera: Conjunctivae normal.   Cardiovascular:      Rate and Rhythm: Normal rate and regular rhythm.      Pulses: Normal pulses.      Heart sounds: Normal heart sounds. No " "murmur heard.     No friction rub. No gallop.   Pulmonary:      Effort: Pulmonary effort is normal.      Breath sounds: Normal breath sounds.   Abdominal:      General: Bowel sounds are normal.      Palpations: Abdomen is soft.   Musculoskeletal:         General: Normal range of motion.      Cervical back: Normal range of motion and neck supple.   Skin:     General: Skin is warm and dry.   Neurological:      General: No focal deficit present.      Mental Status: He is alert and oriented to person, place, and time. Mental status is at baseline.   Psychiatric:         Mood and Affect: Mood normal.         Behavior: Behavior normal.         Thought Content: Thought content normal.         Judgment: Judgment normal.         No results found for: \"TSHULTRASEN\"   No results found for: \"FREET4\"     Lab Results   Component Value Date/Time    HBA1C 8.0 (H) 08/29/2023 09:14 AM       Lab Results   Component Value Date/Time    CHOLSTRLTOT 121 08/29/2023 09:14 AM    LDL 54 08/29/2023 09:14 AM    HDL 42 08/29/2023 09:14 AM    TRIGLYCERIDE 127 08/29/2023 09:14 AM         Lab Results   Component Value Date/Time    SODIUM 139 11/02/2023 01:37 PM    POTASSIUM 4.6 11/02/2023 01:37 PM    CHLORIDE 103 11/02/2023 01:37 PM    CO2 25 11/02/2023 01:37 PM    GLUCOSE 112 (H) 11/02/2023 01:37 PM    BUN 22 11/02/2023 01:37 PM    CREATININE 1.24 11/02/2023 01:37 PM       Lab Results   Component Value Date/Time    ALKPHOSPHAT 62 11/02/2023 01:37 PM    ASTSGOT 19 11/02/2023 01:37 PM    ALTSGPT 18 11/02/2023 01:37 PM    TBILIRUBIN 0.3 11/02/2023 01:37 PM         Imaging  EKG demonstrates sinus rhythm with normal intervals.  I reviewed interpreted EKG.  Findings are discussed with the patient    Echocardiogram  Pending    Total patient time was estimated to be 45 minutes consisting of chart review, direct patient interaction, medication renewal, plan development and overall communication with the cardiovascular team.        Electronically signed by: "   Ravinder Verde DO, MPH  [facility]    Portions of this note were completed using voice recognition software (Dragon Naturally speaking software) . Occasional transcription errors may have escaped proof reading. I have made every reasonable attempt to correct obvious errors, but I expect that there are errors of grammar and possibly content that I did not discover before finalizing the note.

## 2023-11-14 NOTE — PATIENT INSTRUCTIONS
Follow up in 3 months  Check an echocardiogram  Continue current medications  Call with questions.

## 2023-11-15 NOTE — ASSESSMENT & PLAN NOTE
Clinically, he is doing well and his main concern is that he has some dizziness with occasional conversational dyspnea.  He is noted that his exercise tolerance has increased and he is not having exertional dyspnea complaints.  I have recommended that he undergo an echocardiogram to assess LV systolic function as well as diastolic parameters.  This is more concerning given that he has had a cough with subsequent phlegm production that is more of a white sputum.  We will make further recommendations regarding his echocardiographic evaluation.

## 2023-11-15 NOTE — ASSESSMENT & PLAN NOTE
Currently on pravastatin therapy with noted type 2 diabetes mellitus would not be unreasonable to consider transition to moderate intensity statin therapy such as atorvastatin or rosuvastatin.  I will defer management to his primary care provider

## 2023-11-21 ENCOUNTER — OUTPATIENT INFUSION SERVICES (OUTPATIENT)
Dept: ONCOLOGY | Facility: MEDICAL CENTER | Age: 78
End: 2023-11-21
Attending: STUDENT IN AN ORGANIZED HEALTH CARE EDUCATION/TRAINING PROGRAM
Payer: MEDICARE

## 2023-11-21 VITALS
HEART RATE: 86 BPM | OXYGEN SATURATION: 97 % | DIASTOLIC BLOOD PRESSURE: 69 MMHG | TEMPERATURE: 97 F | BODY MASS INDEX: 21.97 KG/M2 | WEIGHT: 136.69 LBS | SYSTOLIC BLOOD PRESSURE: 134 MMHG | HEIGHT: 66 IN | RESPIRATION RATE: 18 BRPM

## 2023-11-21 DIAGNOSIS — L40.0 PLAQUE PSORIASIS: ICD-10-CM

## 2023-11-21 DIAGNOSIS — L40.50 PSORIATIC ARTHRITIS (HCC): ICD-10-CM

## 2023-11-21 PROCEDURE — 96413 CHEMO IV INFUSION 1 HR: CPT

## 2023-11-21 PROCEDURE — 700105 HCHG RX REV CODE 258: Performed by: STUDENT IN AN ORGANIZED HEALTH CARE EDUCATION/TRAINING PROGRAM

## 2023-11-21 PROCEDURE — 700111 HCHG RX REV CODE 636 W/ 250 OVERRIDE (IP): Mod: JZ,JG | Performed by: STUDENT IN AN ORGANIZED HEALTH CARE EDUCATION/TRAINING PROGRAM

## 2023-11-21 RX ORDER — 0.9 % SODIUM CHLORIDE 0.9 %
10 VIAL (ML) INJECTION PRN
Status: CANCELLED | OUTPATIENT
Start: 2024-01-16

## 2023-11-21 RX ORDER — METHYLPREDNISOLONE SODIUM SUCCINATE 125 MG/2ML
125 INJECTION, POWDER, LYOPHILIZED, FOR SOLUTION INTRAMUSCULAR; INTRAVENOUS PRN
Status: CANCELLED | OUTPATIENT
Start: 2024-01-16

## 2023-11-21 RX ORDER — SODIUM CHLORIDE 9 MG/ML
INJECTION, SOLUTION INTRAVENOUS CONTINUOUS
Status: CANCELLED | OUTPATIENT
Start: 2024-01-16

## 2023-11-21 RX ORDER — 0.9 % SODIUM CHLORIDE 0.9 %
VIAL (ML) INJECTION PRN
Status: CANCELLED | OUTPATIENT
Start: 2024-01-16

## 2023-11-21 RX ORDER — ACETAMINOPHEN 325 MG/1
650 TABLET ORAL ONCE
Status: DISCONTINUED | OUTPATIENT
Start: 2023-11-21 | End: 2023-11-21 | Stop reason: HOSPADM

## 2023-11-21 RX ORDER — DIPHENHYDRAMINE HYDROCHLORIDE 50 MG/ML
50 INJECTION INTRAMUSCULAR; INTRAVENOUS PRN
Status: CANCELLED | OUTPATIENT
Start: 2024-01-16

## 2023-11-21 RX ORDER — EPINEPHRINE 1 MG/ML(1)
0.5 AMPUL (ML) INJECTION PRN
Status: CANCELLED | OUTPATIENT
Start: 2024-01-16

## 2023-11-21 RX ORDER — 0.9 % SODIUM CHLORIDE 0.9 %
3 VIAL (ML) INJECTION PRN
Status: CANCELLED | OUTPATIENT
Start: 2024-01-16

## 2023-11-21 RX ORDER — ACETAMINOPHEN 325 MG/1
650 TABLET ORAL ONCE
Status: CANCELLED | OUTPATIENT
Start: 2024-01-16

## 2023-11-21 RX ADMIN — INFLIXIMAB-AXXQ 300 MG: 100 INJECTION, POWDER, LYOPHILIZED, FOR SOLUTION INTRAVENOUS at 14:03

## 2023-11-21 ASSESSMENT — FIBROSIS 4 INDEX: FIB4 SCORE: 1.53

## 2023-11-21 NOTE — PROGRESS NOTES
Phuc is here for inFLIXimab-axxq (Avsola). Denies any signs or symptoms of an active infection. PIV established; brisk blood return noted. Decline pre-medications. inFLIXimab-axxq (Avsola) given per MAR over 1 hour; in-line filter in place. PIV flushed with normal saline and removed; gauze/coban applied to site. Next appointment scheduled. Discharged to self care; no apparent distress noted.

## 2023-11-29 ENCOUNTER — PATIENT MESSAGE (OUTPATIENT)
Dept: HEALTH INFORMATION MANAGEMENT | Facility: OTHER | Age: 78
End: 2023-11-29

## 2023-12-01 ENCOUNTER — OFFICE VISIT (OUTPATIENT)
Dept: URGENT CARE | Facility: PHYSICIAN GROUP | Age: 78
End: 2023-12-01
Payer: MEDICARE

## 2023-12-01 VITALS
HEART RATE: 98 BPM | RESPIRATION RATE: 16 BRPM | HEIGHT: 67 IN | OXYGEN SATURATION: 95 % | BODY MASS INDEX: 21.11 KG/M2 | DIASTOLIC BLOOD PRESSURE: 54 MMHG | SYSTOLIC BLOOD PRESSURE: 108 MMHG | TEMPERATURE: 97 F | WEIGHT: 134.48 LBS

## 2023-12-01 DIAGNOSIS — J01.90 ACUTE BACTERIAL SINUSITIS: ICD-10-CM

## 2023-12-01 DIAGNOSIS — B96.89 ACUTE BACTERIAL SINUSITIS: ICD-10-CM

## 2023-12-01 PROCEDURE — 99213 OFFICE O/P EST LOW 20 MIN: CPT | Performed by: REGISTERED NURSE

## 2023-12-01 PROCEDURE — 3078F DIAST BP <80 MM HG: CPT | Performed by: REGISTERED NURSE

## 2023-12-01 PROCEDURE — 3074F SYST BP LT 130 MM HG: CPT | Performed by: REGISTERED NURSE

## 2023-12-01 RX ORDER — AMOXICILLIN AND CLAVULANATE POTASSIUM 875; 125 MG/1; MG/1
1 TABLET, FILM COATED ORAL 2 TIMES DAILY
Qty: 14 TABLET | Refills: 0 | Status: SHIPPED | OUTPATIENT
Start: 2023-12-01 | End: 2023-12-08

## 2023-12-01 RX ORDER — SILDENAFIL 25 MG/1
25 TABLET, FILM COATED ORAL
COMMUNITY

## 2023-12-01 ASSESSMENT — FIBROSIS 4 INDEX: FIB4 SCORE: 1.53

## 2023-12-01 ASSESSMENT — ENCOUNTER SYMPTOMS
CHILLS: 0
NECK PAIN: 0
DIZZINESS: 0
FEVER: 0
SHORTNESS OF BREATH: 0

## 2023-12-01 NOTE — PROGRESS NOTES
"Subjective:   Phuc Mcdowell is a 77 y.o. male who presents for Sore Throat (Pt has a sore throat, diarrhea x 1 week Dry sinus, yellow mucus, drainage x 6 weeks)    HPI  Started with cold symptoms almost 2 weeks ago, over the past week has developed lots of sinus congestion, pressure and thick yellow/green drainage. Using mucinex. Hx of sinus infections. Denies antibiotic in past 3 months. Is tolerating PO. Denies high fever over 102, eye pain, acute vision changes, neck stiffness, equilibrium disturbances, unilateral/bilateral weakness. Has appointment next week with ENT to clear out his ears.    Review of Systems   Constitutional:  Negative for chills and fever.   Respiratory:  Negative for shortness of breath.    Cardiovascular:  Negative for chest pain.   Musculoskeletal:  Negative for neck pain.   Skin:  Negative for rash.   Neurological:  Negative for dizziness.       Medications, Allergies, and current problem list reviewed today in Epic.     Objective:     /54 (BP Location: Right arm, Patient Position: Sitting, BP Cuff Size: Adult)   Pulse 98   Temp 36.1 °C (97 °F) (Temporal)   Resp 16   Ht 1.702 m (5' 7\")   Wt 61 kg (134 lb 7.7 oz)   SpO2 95%     Physical Exam  Vitals and nursing note reviewed.   Constitutional:       General: He is not in acute distress.     Appearance: Normal appearance. He is well-developed. He is not ill-appearing, toxic-appearing or diaphoretic.   HENT:      Head: Normocephalic and atraumatic.      Right Ear: Hearing normal. There is impacted cerumen.      Left Ear: Hearing normal. There is impacted cerumen.      Nose: Mucosal edema, congestion and rhinorrhea present. Rhinorrhea is purulent.      Right Turbinates: Swollen.      Left Turbinates: Swollen.      Right Sinus: Maxillary sinus tenderness and frontal sinus tenderness present.      Left Sinus: Maxillary sinus tenderness and frontal sinus tenderness present.      Mouth/Throat:      Mouth: Mucous membranes are " moist.      Dentition: Normal dentition. No dental caries.      Pharynx: Posterior oropharyngeal erythema present. No oropharyngeal exudate.      Comments: Colored PND noted  Eyes:      General: No scleral icterus.     Conjunctiva/sclera: Conjunctivae normal.   Cardiovascular:      Rate and Rhythm: Normal rate and regular rhythm.      Pulses: Normal pulses.      Heart sounds: Normal heart sounds.   Pulmonary:      Effort: Pulmonary effort is normal. No respiratory distress.      Breath sounds: Normal breath sounds. No stridor. No wheezing, rhonchi or rales.   Musculoskeletal:      Cervical back: Normal range of motion and neck supple.      Right lower leg: No edema.      Left lower leg: No edema.   Lymphadenopathy:      Cervical: No cervical adenopathy.   Skin:     General: Skin is warm and dry.      Findings: No rash.      Nails: There is no clubbing.   Neurological:      General: No focal deficit present.      Mental Status: He is alert and oriented to person, place, and time. Mental status is at baseline.   Psychiatric:         Mood and Affect: Mood normal.         Behavior: Behavior normal.         Thought Content: Thought content normal.         Judgment: Judgment normal.         Most recent eGFR of 60ml/min    Assessment/Plan:     1. Acute bacterial sinusitis  amoxicillin-clavulanate (AUGMENTIN) 875-125 MG Tab        Differential diagnosis discussed     Multiple symptoms that have spanned up to 4 to 6 weeks but most recently developed cold symptoms 2 weeks ago and over the past week have shifted in the sinuses with pressure and thick purulent drainage.  History of sinus infections.  No red flag signs or symptoms.  Vital signs reassuring.  Exam consistent with uncomplicated acute bacterial sinusitis.  Will start on Augmentin.  Discussed sinus rinses.  Adequate hydration.  Warm steam showers.  Monitor symptoms closely.    Return to clinic or go to ED if symptoms worsen or persist. Indications for ED discussed at  length. Patient/Parent/Guardian voices understanding.     Follow-up with your primary care provider in 3-5 days if needed.     Red flag symptoms discussed. All side effects of medication discussed including allergic response, GI upset, tendon injury, rash, sedation etc.    I personally reviewed prior external notes and test results pertinent to today's visit as well as additional imaging and testing completed in clinic today.     Please note that this dictation was created using voice recognition software. I have made every reasonable attempt to correct obvious errors, but I expect that there are errors of grammar and possibly content that I did not discover before finalizing the note.    This note was electronically signed by JONE Moeller

## 2023-12-21 ENCOUNTER — HOSPITAL ENCOUNTER (OUTPATIENT)
Dept: LAB | Facility: MEDICAL CENTER | Age: 78
End: 2023-12-21
Attending: INTERNAL MEDICINE
Payer: MEDICARE

## 2023-12-21 DIAGNOSIS — E78.5 DYSLIPIDEMIA DUE TO TYPE 2 DIABETES MELLITUS (HCC): Chronic | ICD-10-CM

## 2023-12-21 DIAGNOSIS — D84.9 IMMUNOSUPPRESSED STATUS (HCC): ICD-10-CM

## 2023-12-21 DIAGNOSIS — E11.69 DYSLIPIDEMIA DUE TO TYPE 2 DIABETES MELLITUS (HCC): Chronic | ICD-10-CM

## 2023-12-21 DIAGNOSIS — E11.69 TYPE 2 DIABETES MELLITUS WITH HYPERLIPIDEMIA (HCC): Chronic | ICD-10-CM

## 2023-12-21 DIAGNOSIS — E78.5 TYPE 2 DIABETES MELLITUS WITH HYPERLIPIDEMIA (HCC): Chronic | ICD-10-CM

## 2023-12-21 LAB
ALT SERPL-CCNC: 18 U/L (ref 2–50)
ANION GAP SERPL CALC-SCNC: 10 MMOL/L (ref 7–16)
BUN SERPL-MCNC: 22 MG/DL (ref 8–22)
CALCIUM SERPL-MCNC: 9.9 MG/DL (ref 8.5–10.5)
CHLORIDE SERPL-SCNC: 104 MMOL/L (ref 96–112)
CHOLEST SERPL-MCNC: 143 MG/DL (ref 100–199)
CO2 SERPL-SCNC: 26 MMOL/L (ref 20–33)
CREAT SERPL-MCNC: 1.08 MG/DL (ref 0.5–1.4)
EST. AVERAGE GLUCOSE BLD GHB EST-MCNC: 148 MG/DL
FASTING STATUS PATIENT QL REPORTED: NORMAL
GFR SERPLBLD CREATININE-BSD FMLA CKD-EPI: 71 ML/MIN/1.73 M 2
GLUCOSE SERPL-MCNC: 169 MG/DL (ref 65–99)
HBA1C MFR BLD: 6.8 % (ref 4–5.6)
HDLC SERPL-MCNC: 45 MG/DL
LDLC SERPL CALC-MCNC: 76 MG/DL
POTASSIUM SERPL-SCNC: 4.9 MMOL/L (ref 3.6–5.5)
SODIUM SERPL-SCNC: 140 MMOL/L (ref 135–145)
TRIGL SERPL-MCNC: 111 MG/DL (ref 0–149)

## 2023-12-21 PROCEDURE — 80048 BASIC METABOLIC PNL TOTAL CA: CPT

## 2023-12-21 PROCEDURE — 83036 HEMOGLOBIN GLYCOSYLATED A1C: CPT | Mod: GA

## 2023-12-21 PROCEDURE — 36415 COLL VENOUS BLD VENIPUNCTURE: CPT

## 2023-12-21 PROCEDURE — 84460 ALANINE AMINO (ALT) (SGPT): CPT

## 2023-12-21 PROCEDURE — 80061 LIPID PANEL: CPT

## 2024-01-04 ENCOUNTER — HOSPITAL ENCOUNTER (OUTPATIENT)
Dept: CARDIOLOGY | Facility: MEDICAL CENTER | Age: 79
End: 2024-01-04
Attending: INTERNAL MEDICINE
Payer: MEDICARE

## 2024-01-04 DIAGNOSIS — R06.09 DYSPNEA ON EXERTION: ICD-10-CM

## 2024-01-04 PROCEDURE — 93306 TTE W/DOPPLER COMPLETE: CPT

## 2024-01-08 PROCEDURE — 93306 TTE W/DOPPLER COMPLETE: CPT | Mod: 26 | Performed by: INTERNAL MEDICINE

## 2024-01-09 DIAGNOSIS — G47.52 REM SLEEP BEHAVIOR DISORDER: ICD-10-CM

## 2024-01-10 RX ORDER — CLONAZEPAM 0.5 MG/1
1.5 TABLET ORAL NIGHTLY
Qty: 90 TABLET | Refills: 2 | Status: SHIPPED | OUTPATIENT
Start: 2024-01-10 | End: 2024-04-09

## 2024-01-10 NOTE — TELEPHONE ENCOUNTER
Have we ever prescribed this med? Yes.  If yes, what date? 8/18/22    Last OV: 10/4/23 Dr. Hernandez    Next OV: no pending appt    DX: n/a    Medications: clonazePAM (KLONOPIN) 0.5 MG Tab

## 2024-01-16 ENCOUNTER — OUTPATIENT INFUSION SERVICES (OUTPATIENT)
Dept: ONCOLOGY | Facility: MEDICAL CENTER | Age: 79
End: 2024-01-16
Attending: STUDENT IN AN ORGANIZED HEALTH CARE EDUCATION/TRAINING PROGRAM
Payer: MEDICARE

## 2024-01-16 VITALS
DIASTOLIC BLOOD PRESSURE: 66 MMHG | WEIGHT: 134.48 LBS | BODY MASS INDEX: 21.61 KG/M2 | OXYGEN SATURATION: 98 % | RESPIRATION RATE: 18 BRPM | SYSTOLIC BLOOD PRESSURE: 129 MMHG | HEART RATE: 89 BPM | TEMPERATURE: 96.4 F | HEIGHT: 66 IN

## 2024-01-16 DIAGNOSIS — L40.0 PLAQUE PSORIASIS: ICD-10-CM

## 2024-01-16 DIAGNOSIS — L40.50 PSORIATIC ARTHRITIS (HCC): ICD-10-CM

## 2024-01-16 PROCEDURE — 700105 HCHG RX REV CODE 258: Performed by: STUDENT IN AN ORGANIZED HEALTH CARE EDUCATION/TRAINING PROGRAM

## 2024-01-16 PROCEDURE — 96413 CHEMO IV INFUSION 1 HR: CPT

## 2024-01-16 PROCEDURE — 700111 HCHG RX REV CODE 636 W/ 250 OVERRIDE (IP): Mod: JZ,JG | Performed by: STUDENT IN AN ORGANIZED HEALTH CARE EDUCATION/TRAINING PROGRAM

## 2024-01-16 RX ORDER — 0.9 % SODIUM CHLORIDE 0.9 %
10 VIAL (ML) INJECTION PRN
Status: CANCELLED | OUTPATIENT
Start: 2024-03-12

## 2024-01-16 RX ORDER — DIPHENHYDRAMINE HYDROCHLORIDE 50 MG/ML
50 INJECTION INTRAMUSCULAR; INTRAVENOUS PRN
Status: CANCELLED | OUTPATIENT
Start: 2024-03-12

## 2024-01-16 RX ORDER — SODIUM CHLORIDE 9 MG/ML
INJECTION, SOLUTION INTRAVENOUS CONTINUOUS
Status: CANCELLED | OUTPATIENT
Start: 2024-03-12

## 2024-01-16 RX ORDER — ACETAMINOPHEN 325 MG/1
650 TABLET ORAL ONCE
Status: DISCONTINUED | OUTPATIENT
Start: 2024-01-16 | End: 2024-01-16 | Stop reason: HOSPADM

## 2024-01-16 RX ORDER — EPINEPHRINE 1 MG/ML(1)
0.5 AMPUL (ML) INJECTION PRN
Status: CANCELLED | OUTPATIENT
Start: 2024-03-12

## 2024-01-16 RX ORDER — ACETAMINOPHEN 325 MG/1
650 TABLET ORAL ONCE
Status: CANCELLED | OUTPATIENT
Start: 2024-03-12

## 2024-01-16 RX ORDER — METHYLPREDNISOLONE SODIUM SUCCINATE 125 MG/2ML
125 INJECTION, POWDER, LYOPHILIZED, FOR SOLUTION INTRAMUSCULAR; INTRAVENOUS PRN
Status: CANCELLED | OUTPATIENT
Start: 2024-03-12

## 2024-01-16 RX ORDER — 0.9 % SODIUM CHLORIDE 0.9 %
3 VIAL (ML) INJECTION PRN
Status: CANCELLED | OUTPATIENT
Start: 2024-03-12

## 2024-01-16 RX ORDER — 0.9 % SODIUM CHLORIDE 0.9 %
VIAL (ML) INJECTION PRN
Status: CANCELLED | OUTPATIENT
Start: 2024-03-12

## 2024-01-16 RX ADMIN — INFLIXIMAB-AXXQ 300 MG: 100 INJECTION, POWDER, LYOPHILIZED, FOR SOLUTION INTRAVENOUS at 11:34

## 2024-01-16 ASSESSMENT — FIBROSIS 4 INDEX: FIB4 SCORE: 1.55

## 2024-01-16 NOTE — PROGRESS NOTES
Pt arrives to [facility] for Infliximab infusion for psoriatic arthritis.  Pt denies s/s of infection or worsening of symptoms.   Pt declines pre-medications.  Infliximab infused over 1 hour through in line filter.  PIV flushed with NS and site removed intact.  Site wrapped with pressure dressing.  Confirmed next appt on 3/12/2024 with pt.  Pt dc home to self care.

## 2024-01-18 DIAGNOSIS — E78.2 MIXED HYPERLIPIDEMIA: ICD-10-CM

## 2024-01-18 RX ORDER — PRAVASTATIN SODIUM 20 MG
20 TABLET ORAL EVERY EVENING
Qty: 90 TABLET | Refills: 0 | Status: SHIPPED | OUTPATIENT
Start: 2024-01-18

## 2024-01-18 NOTE — TELEPHONE ENCOUNTER
"PHONECALL  1. Caller Name: Phuc Mcdowell                      Call Back Number: [phone]    2. Message: pt was seen by Johanna Ching on 07/18/2022 and he called inn stating that he has continued sore throat , stuffy nose with yellow mucus, a very raspy voice and has woken up with eye \"gunk\" as he describes it closing his inner corners of his eye shut. He has been using a warm compress to alleviate the eye issue but is wondering if there is anything that he can take or he should do to get better.     "
Wt 71.7 kg (158 lb)   BMI 30.10 kg/m²     General: Somewhat overweight oriented normal affect  EENT: No thyromegaly  Neck: No cervical adenopathy or masses  Pulmonary: Clear  Cardiovascular: Regular rhythm  Musculoskeletal: Great spinal motion and moves around the room freely without pain      ASSESSMENT:        Osteoporosis and high fracture risk  Previous history of lung trauma pelvic fracture  History of surgical menopause age 32  Some sort of thyroid issue currently on replacement therapy          PLAN:      Increase Citracal to 2 twice a day continue vitamin D 2000 IUs a day  She will continue with zoledronic acid   Schedule bone density later in 2024  Return for Reclast and to see me in 1 year              No follow-up provider specified.      ANGI Roy M.D.  Rheumatology - [facility]

## 2024-02-01 ENCOUNTER — HOSPITAL ENCOUNTER (OUTPATIENT)
Dept: RADIOLOGY | Facility: MEDICAL CENTER | Age: 79
End: 2024-02-01
Attending: INTERNAL MEDICINE
Payer: MEDICARE

## 2024-02-01 ENCOUNTER — OFFICE VISIT (OUTPATIENT)
Dept: MEDICAL GROUP | Facility: PHYSICIAN GROUP | Age: 79
End: 2024-02-01
Payer: MEDICARE

## 2024-02-01 VITALS
HEART RATE: 90 BPM | HEIGHT: 67 IN | BODY MASS INDEX: 21.19 KG/M2 | SYSTOLIC BLOOD PRESSURE: 114 MMHG | DIASTOLIC BLOOD PRESSURE: 72 MMHG | TEMPERATURE: 97.9 F | OXYGEN SATURATION: 97 % | WEIGHT: 135 LBS

## 2024-02-01 DIAGNOSIS — M54.50 ACUTE LEFT-SIDED LOW BACK PAIN WITHOUT SCIATICA: ICD-10-CM

## 2024-02-01 DIAGNOSIS — E78.5 TYPE 2 DIABETES MELLITUS WITH HYPERLIPIDEMIA (HCC): Chronic | ICD-10-CM

## 2024-02-01 DIAGNOSIS — L40.50 PSORIATIC ARTHRITIS (HCC): ICD-10-CM

## 2024-02-01 DIAGNOSIS — E11.69 TYPE 2 DIABETES MELLITUS WITH HYPERLIPIDEMIA (HCC): Chronic | ICD-10-CM

## 2024-02-01 DIAGNOSIS — E11.69 DYSLIPIDEMIA DUE TO TYPE 2 DIABETES MELLITUS (HCC): Chronic | ICD-10-CM

## 2024-02-01 DIAGNOSIS — D84.9 IMMUNOSUPPRESSED STATUS (HCC): ICD-10-CM

## 2024-02-01 DIAGNOSIS — K21.9 GASTROESOPHAGEAL REFLUX DISEASE WITHOUT ESOPHAGITIS: Chronic | ICD-10-CM

## 2024-02-01 DIAGNOSIS — G47.52 REM SLEEP BEHAVIOR DISORDER: Chronic | ICD-10-CM

## 2024-02-01 DIAGNOSIS — Z11.59 NEED FOR HEPATITIS C SCREENING TEST: ICD-10-CM

## 2024-02-01 DIAGNOSIS — E78.5 DYSLIPIDEMIA DUE TO TYPE 2 DIABETES MELLITUS (HCC): Chronic | ICD-10-CM

## 2024-02-01 PROCEDURE — 99215 OFFICE O/P EST HI 40 MIN: CPT | Performed by: INTERNAL MEDICINE

## 2024-02-01 PROCEDURE — 72110 X-RAY EXAM L-2 SPINE 4/>VWS: CPT

## 2024-02-01 PROCEDURE — 3078F DIAST BP <80 MM HG: CPT | Performed by: INTERNAL MEDICINE

## 2024-02-01 PROCEDURE — 3074F SYST BP LT 130 MM HG: CPT | Performed by: INTERNAL MEDICINE

## 2024-02-01 ASSESSMENT — FIBROSIS 4 INDEX: FIB4 SCORE: 1.55

## 2024-02-01 ASSESSMENT — PATIENT HEALTH QUESTIONNAIRE - PHQ9: CLINICAL INTERPRETATION OF PHQ2 SCORE: 0

## 2024-02-01 NOTE — PROGRESS NOTES
PRIMARY CARE CLINIC VISIT        Chief Complaint   Patient presents with    Follow-Up     Chronic low back pain  PT referral request  Psoriatic arthritis  Follow-up diabetes  Immunosuppressed status  Hyperlipidemia  Acid reflux  REM sleep behavior disorder          History of Present Illness     Psoriatic arthritis (HCC)  Chronic condition.  Patient followed by rheumatology service.  Patient is currently taking being treated with methotrexate and Remicade injection every 8 weeks.  Patient has noted improvement in his condition.  The patient tolerating treatment well.    Type 2 diabetes mellitus with hyperlipidemia (HCC)  Chronic condition.  Patient currently taking metformin 1000 Mg twice daily and glimepiride 2 mg twice daily.  Patient tolerating medication well.  Recent A1c 6.9%.  Patient denies significant hypoglycemic symptoms.    Immunosuppressed status (HCC)  Chronic condition.  The patient being treated with Remicade.  Patient currently asymptomatic.    Dyslipidemia due to type 2 diabetes mellitus (HCC)  Chronic ongoing condition.  The patient is taking pravastatin 20 Mg daily.  Patient tolerating medication well.  Recent lipid panel result discussed with the patient.    Gastroesophageal reflux disease without esophagitis  Chronic condition.  The patient being treated with omeprazole 20 Mg daily.  Patient denies nausea vomiting dysphagia or unexplained weight loss.    REM sleep behavior disorder  Chronic condition.  Patient followed by sleep specialist.  Patient is currently taking Klonopin 1.5 Mg at bedtime along with melatonin.  No significant side effects reported.    Acute left-sided low back pain without sciatica  This is a new condition.  The patient reported left low back pain since the last few weeks.  He denies recent trauma or injury.  The patient denies motor weakness paresthesia.  He denies unexplained weight loss or night sweats.    Current Outpatient Medications on File Prior to Visit    Medication Sig Dispense Refill    pravastatin (PRAVACHOL) 20 MG Tab Take 1 Tablet by mouth every evening. 90 Tablet 0    clonazePAM (KLONOPIN) 0.5 MG Tab Take 3 Tablets by mouth every evening for 90 days. Indications: Disorder of Rapid Eye Movement Period of Sleep 90 Tablet 2    sildenafil citrate (VIAGRA) 25 MG Tab Take 25 mg by mouth 1 time a day as needed for Erectile Dysfunction.      metformin (GLUCOPHAGE) 1000 MG tablet Take 1 Tablet by mouth 2 times a day with meals. 180 Tablet 3    Loratadine (KLS ALLERCLEAR PO)       methotrexate 2.5 MG Tab TAKE 6 TABLETS BY MOUTH EVERY 7 DAYS 76 Tablet 3    glimepiride (AMARYL) 2 MG Tab Take 1 Tablet by mouth 2 times a day. 180 Tablet 3    omeprazole (PRILOSEC) 20 MG delayed-release capsule Take 1 Capsule by mouth every day. 90 Capsule 3    inFLIXimab (REMICADE) 100 MG Recon Soln Infuse 600 mg into a venous catheter every 8 weeks.      Melatonin 10 MG Cap Take 20 mg by mouth every evening.      folic acid (FOLVITE) 1 MG Tab Take 1 Tablet by mouth every day. Except the day of methotrexate. 90 Tablet 3    Acetaminophen (TYLENOL ARTHRITIS PAIN PO) Take 1,250 mg by mouth every day.       No current facility-administered medications on file prior to visit.        Allergies: Patient has no known allergies.    Current Outpatient Medications Ordered in Epic   Medication Sig Dispense Refill    pravastatin (PRAVACHOL) 20 MG Tab Take 1 Tablet by mouth every evening. 90 Tablet 0    clonazePAM (KLONOPIN) 0.5 MG Tab Take 3 Tablets by mouth every evening for 90 days. Indications: Disorder of Rapid Eye Movement Period of Sleep 90 Tablet 2    sildenafil citrate (VIAGRA) 25 MG Tab Take 25 mg by mouth 1 time a day as needed for Erectile Dysfunction.      metformin (GLUCOPHAGE) 1000 MG tablet Take 1 Tablet by mouth 2 times a day with meals. 180 Tablet 3    Loratadine (KLS ALLERCLEAR PO)       methotrexate 2.5 MG Tab TAKE 6 TABLETS BY MOUTH EVERY 7 DAYS 76 Tablet 3    glimepiride (AMARYL) 2  MG Tab Take 1 Tablet by mouth 2 times a day. 180 Tablet 3    omeprazole (PRILOSEC) 20 MG delayed-release capsule Take 1 Capsule by mouth every day. 90 Capsule 3    inFLIXimab (REMICADE) 100 MG Recon Soln Infuse 600 mg into a venous catheter every 8 weeks.      Melatonin 10 MG Cap Take 20 mg by mouth every evening.      folic acid (FOLVITE) 1 MG Tab Take 1 Tablet by mouth every day. Except the day of methotrexate. 90 Tablet 3    Acetaminophen (TYLENOL ARTHRITIS PAIN PO) Take 1,250 mg by mouth every day.       No current [facility]-ordered facility-administered medications on file.       Past Medical History:   Diagnosis Date    Back pain     Back pain     Chickenpox     Diabetes (HCC)     Double vision     Frequent urination     Heartburn     Hyperlipidemia     Influenza     Mumps     Nasal drainage     Painful joint     Psoriatic arthritis (HCC)     Rash     REM sleep behavior disorder     Rheumatoid arthritis (HCC)     Sleep apnea     Sore muscles     Tonsillitis     Wears glasses        Past Surgical History:   Procedure Laterality Date    CATARACT EXTRACTION WITH IOL Bilateral     [place] REMV 2ND CATARACT,CORN-SCLER SECTN         Family History   Problem Relation Age of Onset    Cancer Mother         lung and brain -  at 88    Heart Disease Father         passed at 62    Hypertension Sister     Hypertension Brother     Cancer Brother        Social History     Tobacco Use   Smoking Status Former   Smokeless Tobacco Never   Tobacco Comments    quit in        Social History     Substance and Sexual Activity   Alcohol Use Yes    Alcohol/week: 1.2 oz    Types: 2 Glasses of wine per week    Comment: occasional glass of wine       Review of systems.  As per HPI above. All other systems reviewed and negative.      Past Medical, Social, and Family history reviewed and updated in EPIC     Objective     /72 (BP Location: Left arm, Patient Position: Sitting, BP Cuff Size: Adult)   Pulse 90   Temp 36.6 °C (97.9 °F)  "(Temporal)   Ht 1.702 m (5' 7\")   Wt 61.2 kg (135 lb)   SpO2 97%    Body mass index is 21.14 kg/m².    General: alert in no apparent distress.  Cardiovascular: regular rate and rhythm  Pulmonary: lungs : no wheezing   Gastrointestinal: BS present.   Monofilament testing with a 10 gram force: sensation intact: decreased bilaterally  Visual Inspection: Feet without maceration, ulcers, fissures.  Pedal pulses: decreased bilaterally   Low back: No significant spinal deformity noted.   Pain noted w palpation of the lumbar region.  ROM : flexion, extension, rotation, lateral bend of back limited due to pain       Lab Results   Component Value Date/Time    HBA1C 6.8 (H) 12/21/2023 09:58 AM    HBA1C 8.0 (H) 08/29/2023 09:14 AM    HBA1C 6.8 (H) 04/26/2023 08:34 AM       Lab Results   Component Value Date/Time    SODIUM 140 12/21/2023 09:58 AM    POTASSIUM 4.9 12/21/2023 09:58 AM    GLUCOSE 169 (H) 12/21/2023 09:58 AM    BUN 22 12/21/2023 09:58 AM    CREATININE 1.08 12/21/2023 09:58 AM       Lab Results   Component Value Date/Time    CHOLSTRLTOT 143 12/21/2023 09:58 AM    TRIGLYCERIDE 111 12/21/2023 09:58 AM    HDL 45 12/21/2023 09:58 AM    LDL 76 12/21/2023 09:58 AM       Lab Results   Component Value Date/Time    ALTSGPT 18 12/21/2023 09:58 AM             Assessment and Plan     1. REM sleep behavior disorder  Chronic stable condition.  Continue with Klonopin 1.5 Mg at bedtime as needed.  Continue follow-up with sleep specialist as directed    2. Type 2 diabetes mellitus with hyperlipidemia (HCC)  - Diabetic Monofilament LE Exam  - HEMOGLOBIN A1C; Future  - Basic Metabolic Panel; Future  - MICROALBUMIN CREAT RATIO URINE; Future  Chronic stable condition.  A1c 6.8%  .  Continue metformin 1000 Mg twice daily and glimepiride 2 Mg twice daily  Discussed with the patient goals for Diabetes management:  -HbA1C < 7% /  Fasting BG   /  2 hrs postprandial  - 180    Pt's education:   -The importance of increasing " physical activity to improve diabetes control  discussed with the patient.   -Patient was also educated on changing diet and making better choices to help control blood sugar.          3. Psoriatic arthritis (HCC)  Chronic stable.  Continue methotrexate 15 Mg every 7 days and Remicade treatment 600 mg every 8 weeks.  Continue follow-up with rheumatology service.    4. Dyslipidemia due to type 2 diabetes mellitus (HCC)  - ALANINE AMINO-TRANS; Future  - Lipid Profile; Future  Chronic condition.  Stable.  Lipid panel result discussed with the patient.    Continue pravastatin 20 Mg daily          5. Gastroesophageal reflux disease without esophagitis  Chronic stable.  Continue omeprazole 20 Mg daily    6. Immunosuppressed status (HCC)  Chronic condition.  Patient is currently taking Remicade.  Patient currently asymptomatic.  Continue to monitor    7. Need for hepatitis C screening test  - HCV Scrn ( [id number] 1xLife - Medicare Patients Only); Future    8. Acute left-sided low back pain without sciatica  This is a new condition.  Rule out degenerative disc/osteoarthritis versus others.  - Referral to Physical Therapy  - DX-LUMBAR SPINE-4+ VIEWS; Future  - Advised pt re: neck/ back care, posture and regular stretching exercises.   Rec to maintain ideal weight.   Ice/heat application as directed.  Avoid heavy lifting or activities which may aggravate pt's condition.   Consider the use of over the counter topical treatment such as Icy/hot or Biofreeze            Attestation: I spent:  43  min -  That includes time for chart review before the visit, the actual patient visit, and time spent on documentation in EMR after the visit.  Chart review/prep, review of other providers' records, imaging/lab review, face-to-face time for history/examination, pt's counseling/education, ordering, prescribing,  review of results/meds/ treatment plan with patient, and care coordination.                 Please note that this dictation  was created using voice recognition software. I have made every reasonable attempt to correct obvious errors, but I expect that there are errors of grammar and possibly content that I did not discover before finalizing the note.    Raul Low MD  Internal Medicine  [facility]

## 2024-02-14 ENCOUNTER — OFFICE VISIT (OUTPATIENT)
Dept: CARDIOLOGY | Facility: MEDICAL CENTER | Age: 79
End: 2024-02-14
Attending: INTERNAL MEDICINE
Payer: MEDICARE

## 2024-02-14 VITALS
RESPIRATION RATE: 17 BRPM | BODY MASS INDEX: 21.12 KG/M2 | OXYGEN SATURATION: 94 % | HEIGHT: 67 IN | HEART RATE: 89 BPM | DIASTOLIC BLOOD PRESSURE: 60 MMHG | SYSTOLIC BLOOD PRESSURE: 112 MMHG | WEIGHT: 134.6 LBS

## 2024-02-14 DIAGNOSIS — R06.09 DYSPNEA ON EXERTION: ICD-10-CM

## 2024-02-14 DIAGNOSIS — I10 HYPERTENSION, UNSPECIFIED TYPE: ICD-10-CM

## 2024-02-14 DIAGNOSIS — E78.5 TYPE 2 DIABETES MELLITUS WITH HYPERLIPIDEMIA (HCC): Chronic | ICD-10-CM

## 2024-02-14 DIAGNOSIS — E11.69 TYPE 2 DIABETES MELLITUS WITH HYPERLIPIDEMIA (HCC): Chronic | ICD-10-CM

## 2024-02-14 LAB — EKG IMPRESSION: NORMAL

## 2024-02-14 PROCEDURE — 99213 OFFICE O/P EST LOW 20 MIN: CPT | Performed by: INTERNAL MEDICINE

## 2024-02-14 PROCEDURE — 3078F DIAST BP <80 MM HG: CPT | Performed by: INTERNAL MEDICINE

## 2024-02-14 PROCEDURE — 3074F SYST BP LT 130 MM HG: CPT | Performed by: INTERNAL MEDICINE

## 2024-02-14 PROCEDURE — 93005 ELECTROCARDIOGRAM TRACING: CPT | Performed by: INTERNAL MEDICINE

## 2024-02-14 PROCEDURE — 93010 ELECTROCARDIOGRAM REPORT: CPT | Performed by: INTERNAL MEDICINE

## 2024-02-14 ASSESSMENT — FIBROSIS 4 INDEX: FIB4 SCORE: 1.55

## 2024-02-14 NOTE — ASSESSMENT & PLAN NOTE
Given his noted dyslipidemia and underlying type 2 diabetes mellitus he is able to tolerate pravastatin 20 mg daily.  Would recommend annual lipid panel testing as well as comprehensive metabolic panel to assess overall liver function testing

## 2024-02-14 NOTE — PROGRESS NOTES
[facility]    PatientName:Phuc Hoang ShippDate: 2024  :1945    78 y.o.PCP:Raul Low M.D.  MRN:[id number]        Problems and Plans    Dyspnea on exertion  Clinically, he is doing excellent and not having any adverse cardiovascular complaints.  He is not having further episodes of exertional dyspnea which were likely multifactorial in nature.  He continues to integrate exercise in various forms to try and mitigate overuse injuries that were causing lower back pain.  He will continue with his current medications no changes are made at this time    Type 2 diabetes mellitus with hyperlipidemia (HCC)  Given his noted dyslipidemia and underlying type 2 diabetes mellitus he is able to tolerate pravastatin 20 mg daily.  Would recommend annual lipid panel testing as well as comprehensive metabolic panel to assess overall liver function testing    Return in about 1 year (around 2025).      Encounter    Reason for Visit / Chief Complaint: Exertional dyspnea    HPI    77-year-old male with known history of psoriatic arthritis with immunosuppression, sleep disorder behavior, dyslipidemia, type 2 diabetes mellitus, gastroesophageal reflux disease presents in clinic for evaluation of dyspnea on exertion and review of recent cardiovascular testing.    Currently, he notes he is feeling well and not having significant cardiovascular complaints.  Is able to do his desired activities and is recently started doing yoga for exercise..    Most recent cardiovascular testing consist of echocardiogram which demonstrates preserved LV systolic function with estimate ejection fraction of 55 to 60% without regional wall motion abnormalities grade 1 diastolic dysfunction and no significant valvular disease  Past Medical History  Past Medical History:   Diagnosis Date    Back pain     Back pain     Chickenpox     Diabetes (HCC)     Double vision     Frequent urination     Heartburn      Hyperlipidemia     Influenza     Mumps     Nasal drainage     Painful joint     Psoriatic arthritis (HCC)     Rash     REM sleep behavior disorder     Rheumatoid arthritis (HCC)     Sleep apnea     Sore muscles     Tonsillitis     Wears glasses      Past Surgical History  Past Surgical History:   Procedure Laterality Date    CATARACT EXTRACTION WITH IOL Bilateral 2016    [place] REMV 2ND CATARACT,CORN-SCLER SECTN       Social History  Social History     Socioeconomic History    Marital status:      Spouse name: Not on file    Number of children: Not on file    Years of education: Not on file    Highest education level: Bachelor's degree (e.g., BA, AB, BS)   Occupational History    Not on file   Tobacco Use    Smoking status: Former    Smokeless tobacco: Never    Tobacco comments:     quit in 1994   Vaping Use    Vaping Use: Never used   Substance and Sexual Activity    Alcohol use: Yes     Alcohol/week: 1.2 oz     Types: 2 Glasses of wine per week     Comment: occasional glass of wine    Drug use: Never    Sexual activity: Yes     Partners: Female     Birth control/protection: None   Other Topics Concern    Not on file   Social History Narrative    Not on file     Social Determinants of Health     Financial Resource Strain: Low Risk  (11/1/2022)    Overall Financial Resource Strain (CARDIA)     Difficulty of Paying Living Expenses: Not very hard   Food Insecurity: No Food Insecurity (11/1/2022)    Hunger Vital Sign     Worried About Running Out of Food in the Last Year: Never true     Ran Out of Food in the Last Year: Never true   Transportation Needs: No Transportation Needs (11/1/2022)    PRAPARE - Transportation     Lack of Transportation (Medical): No     Lack of Transportation (Non-Medical): No   Physical Activity: Sufficiently Active (11/1/2022)    Exercise Vital Sign     Days of Exercise per Week: 3 days     Minutes of Exercise per Session: 60 min   Stress: No Stress Concern Present (11/1/2022)    [place]  "[employer] - Occupational Stress Questionnaire     Feeling of Stress : Not at all   Social Connections: Not on file   Intimate Partner Violence: Not on file   Housing Stability: Low Risk  (2022)    Housing Stability Vital Sign     Unable to Pay for Housing in the Last Year: No     Number of Places Lived in the Last Year: 2     Unstable Housing in the Last Year: No     Past Family History  Family History   Problem Relation Age of Onset    Cancer Mother         lung and brain -  at 88    Heart Disease Father         passed at 62    Hypertension Sister     Hypertension Brother     Cancer Brother      Medication(s)    Current Outpatient Medications:     pravastatin, 20 mg, Oral, Q EVENING, Taking    clonazePAM, 1.5 mg, Oral, Nightly, Taking    sildenafil citrate, 25 mg, Oral, QDAY PRN, Taking    metformin, 1,000 mg, Oral, BID WITH MEALS, Taking    folic acid, 1 mg, Oral, DAILY, Taking    Loratadine (KLS ALLERCLEAR PO), , Taking    methotrexate, 15 mg, Oral, Q7 DAYS, Taking    glimepiride, 2 mg, Oral, BID, Taking    omeprazole, 20 mg, Oral, DAILY, Taking    inFLIXimab, 600 mg, Intravenous, Q 8 WEEKS, Taking    Melatonin, 20 mg, Oral, Nightly, Taking    Acetaminophen (TYLENOL ARTHRITIS PAIN PO), 1,250 mg, Oral, DAILY, PRN  Allergies  Patient has no known allergies.    Review of Systems    A comprehensive 10 system review was conducted and is negative except as noted above in the HPI or here.      Vital Signs  /60 (BP Location: Left arm, Patient Position: Sitting, BP Cuff Size: Adult)   Pulse 89   Resp 17   Ht 1.702 m (5' 7\")   Wt 61.1 kg (134 lb 9.6 oz)   SpO2 94%   BMI 21.08 kg/m²     Physical Exam  Constitutional:       Appearance: Normal appearance. He is obese.   HENT:      Head: Normocephalic and atraumatic.      Mouth/Throat:      Mouth: Mucous membranes are moist.      Pharynx: Oropharynx is clear.   Eyes:      Extraocular Movements: Extraocular movements intact.      " "Conjunctiva/sclera: Conjunctivae normal.   Cardiovascular:      Rate and Rhythm: Normal rate and regular rhythm.      Pulses: Normal pulses.      Heart sounds: Normal heart sounds. No murmur heard.     No friction rub. No gallop.   Pulmonary:      Effort: Pulmonary effort is normal.      Breath sounds: Normal breath sounds.   Abdominal:      General: Bowel sounds are normal.      Palpations: Abdomen is soft.   Musculoskeletal:         General: Normal range of motion.      Cervical back: Normal range of motion and neck supple.   Skin:     General: Skin is warm and dry.   Neurological:      General: No focal deficit present.      Mental Status: He is alert and oriented to person, place, and time. Mental status is at baseline.   Psychiatric:         Mood and Affect: Mood normal.         Behavior: Behavior normal.         Thought Content: Thought content normal.         Judgment: Judgment normal.         No results found for: \"TSHULTRASEN\"   No results found for: \"FREET4\"     Lab Results   Component Value Date/Time    HBA1C 6.8 (H) 12/21/2023 09:58 AM       Lab Results   Component Value Date/Time    CHOLSTRLTOT 143 12/21/2023 09:58 AM    LDL 76 12/21/2023 09:58 AM    HDL 45 12/21/2023 09:58 AM    TRIGLYCERIDE 111 12/21/2023 09:58 AM         Lab Results   Component Value Date/Time    SODIUM 140 12/21/2023 09:58 AM    POTASSIUM 4.9 12/21/2023 09:58 AM    CHLORIDE 104 12/21/2023 09:58 AM    CO2 26 12/21/2023 09:58 AM    GLUCOSE 169 (H) 12/21/2023 09:58 AM    BUN 22 12/21/2023 09:58 AM    CREATININE 1.08 12/21/2023 09:58 AM       Lab Results   Component Value Date/Time    ALKPHOSPHAT 62 11/02/2023 01:37 PM    ASTSGOT 19 11/02/2023 01:37 PM    ALTSGPT 18 12/21/2023 09:58 AM    TBILIRUBIN 0.3 11/02/2023 01:37 PM         Imaging  Normal sinus rhythm with normal intervals.  I reviewed interpreted EKG.  Findings are discussed with the patient    Echocardiogram  As noted above    Total patient time was estimated to be 20 minutes " consisting of chart review, direct patient interaction, medication renewal, plan development and overall communication with the cardiovascular team.        Electronically signed by:   Ravinder Verde DO, MPH  [facility]    Portions of this note were completed using voice recognition software (Dragon Naturally speaking software) . Occasional transcription errors may have escaped proof reading. I have made every reasonable attempt to correct obvious errors, but I expect that there are errors of grammar and possibly content that I did not discover before finalizing the note.

## 2024-02-14 NOTE — ASSESSMENT & PLAN NOTE
Clinically, he is doing excellent and not having any adverse cardiovascular complaints.  He is not having further episodes of exertional dyspnea which were likely multifactorial in nature.  He continues to integrate exercise in various forms to try and mitigate overuse injuries that were causing lower back pain.  He will continue with his current medications no changes are made at this time

## 2024-03-19 ENCOUNTER — OUTPATIENT INFUSION SERVICES (OUTPATIENT)
Dept: ONCOLOGY | Facility: MEDICAL CENTER | Age: 79
End: 2024-03-19
Attending: STUDENT IN AN ORGANIZED HEALTH CARE EDUCATION/TRAINING PROGRAM
Payer: MEDICARE

## 2024-03-19 VITALS
BODY MASS INDEX: 22 KG/M2 | OXYGEN SATURATION: 95 % | TEMPERATURE: 97.1 F | HEIGHT: 66 IN | WEIGHT: 136.91 LBS | RESPIRATION RATE: 16 BRPM | HEART RATE: 80 BPM | DIASTOLIC BLOOD PRESSURE: 71 MMHG | SYSTOLIC BLOOD PRESSURE: 131 MMHG

## 2024-03-19 DIAGNOSIS — L40.50 PSORIATIC ARTHRITIS (HCC): ICD-10-CM

## 2024-03-19 DIAGNOSIS — L40.0 PLAQUE PSORIASIS: ICD-10-CM

## 2024-03-19 PROCEDURE — 700111 HCHG RX REV CODE 636 W/ 250 OVERRIDE (IP): Mod: JZ,JG | Performed by: STUDENT IN AN ORGANIZED HEALTH CARE EDUCATION/TRAINING PROGRAM

## 2024-03-19 PROCEDURE — 96365 THER/PROPH/DIAG IV INF INIT: CPT

## 2024-03-19 PROCEDURE — 700105 HCHG RX REV CODE 258: Performed by: STUDENT IN AN ORGANIZED HEALTH CARE EDUCATION/TRAINING PROGRAM

## 2024-03-19 RX ORDER — METHYLPREDNISOLONE SODIUM SUCCINATE 125 MG/2ML
125 INJECTION, POWDER, LYOPHILIZED, FOR SOLUTION INTRAMUSCULAR; INTRAVENOUS PRN
OUTPATIENT
Start: 2024-05-07

## 2024-03-19 RX ORDER — SODIUM CHLORIDE 9 MG/ML
INJECTION, SOLUTION INTRAVENOUS CONTINUOUS
OUTPATIENT
Start: 2024-05-07

## 2024-03-19 RX ORDER — ACETAMINOPHEN 325 MG/1
650 TABLET ORAL ONCE
OUTPATIENT
Start: 2024-05-07

## 2024-03-19 RX ORDER — 0.9 % SODIUM CHLORIDE 0.9 %
3 VIAL (ML) INJECTION PRN
OUTPATIENT
Start: 2024-05-07

## 2024-03-19 RX ORDER — EPINEPHRINE 1 MG/ML(1)
0.5 AMPUL (ML) INJECTION PRN
OUTPATIENT
Start: 2024-05-07

## 2024-03-19 RX ORDER — 0.9 % SODIUM CHLORIDE 0.9 %
10 VIAL (ML) INJECTION PRN
OUTPATIENT
Start: 2024-05-07

## 2024-03-19 RX ORDER — DIPHENHYDRAMINE HYDROCHLORIDE 50 MG/ML
50 INJECTION INTRAMUSCULAR; INTRAVENOUS PRN
OUTPATIENT
Start: 2024-05-07

## 2024-03-19 RX ORDER — 0.9 % SODIUM CHLORIDE 0.9 %
VIAL (ML) INJECTION PRN
OUTPATIENT
Start: 2024-05-07

## 2024-03-19 RX ADMIN — INFLIXIMAB-AXXQ 300 MG: 100 INJECTION, POWDER, LYOPHILIZED, FOR SOLUTION INTRAVENOUS at 11:33

## 2024-03-19 ASSESSMENT — FIBROSIS 4 INDEX: FIB4 SCORE: 1.55

## 2024-03-19 NOTE — PROGRESS NOTES
Patient arrived to unit for Avsola. He denies any signs or symptoms. VSS. TB and Hep B drawn on 5/6/22. Verified patient has not had any infections or live vaccines in the past 4 weeks.     Avsola administered using a 0.2 micron filter.    Patient tolerated treatment without any adverse effects. Future appointments confirmed. Patient discharged home in stable condition.

## 2024-04-02 DIAGNOSIS — L40.50 PSORIATIC ARTHRITIS (HCC): ICD-10-CM

## 2024-04-02 DIAGNOSIS — L40.0 PLAQUE PSORIASIS: ICD-10-CM

## 2024-04-03 RX ORDER — METHOTREXATE 2.5 MG/1
15 TABLET ORAL
Qty: 78 TABLET | Refills: 3 | Status: SHIPPED | OUTPATIENT
Start: 2024-04-03

## 2024-04-04 DIAGNOSIS — E78.5 TYPE 2 DIABETES MELLITUS WITH HYPERLIPIDEMIA (HCC): ICD-10-CM

## 2024-04-04 DIAGNOSIS — E11.69 TYPE 2 DIABETES MELLITUS WITH HYPERLIPIDEMIA (HCC): ICD-10-CM

## 2024-04-04 RX ORDER — OMEPRAZOLE 20 MG/1
20 CAPSULE, DELAYED RELEASE ORAL DAILY
Qty: 90 CAPSULE | Refills: 0 | Status: SHIPPED | OUTPATIENT
Start: 2024-04-04

## 2024-04-04 RX ORDER — GLIMEPIRIDE 2 MG/1
2 TABLET ORAL 2 TIMES DAILY
Qty: 180 TABLET | Refills: 0 | Status: SHIPPED | OUTPATIENT
Start: 2024-04-04

## 2024-04-04 NOTE — TELEPHONE ENCOUNTER
Received request via: Pharmacy    Was the patient seen in the last year in this department? Yes    Does the patient have an active prescription (recently filled or refills available) for medication(s) requested? No    Pharmacy Name: [facility]    Does the patient have snf Plus and need 100 day supply (blood pressure, diabetes and cholesterol meds only)? Patient does not have SCP

## 2024-04-05 DIAGNOSIS — E78.2 MIXED HYPERLIPIDEMIA: ICD-10-CM

## 2024-04-05 NOTE — TELEPHONE ENCOUNTER
Received request via: Pharmacy    Was the patient seen in the last year in this department? Yes    Does the patient have an active prescription (recently filled or refills available) for medication(s) requested? No    Pharmacy Name: [facility]     Does the patient have custodial Plus and need 100 day supply (blood pressure, diabetes and cholesterol meds only)? Patient does not have SCP

## 2024-04-08 RX ORDER — PRAVASTATIN SODIUM 20 MG
20 TABLET ORAL EVERY EVENING
Qty: 90 TABLET | Refills: 0 | Status: SHIPPED | OUTPATIENT
Start: 2024-04-08

## 2024-04-09 ENCOUNTER — HOSPITAL ENCOUNTER (OUTPATIENT)
Dept: LAB | Facility: MEDICAL CENTER | Age: 79
End: 2024-04-09
Attending: PHYSICIAN ASSISTANT
Payer: MEDICARE

## 2024-04-09 LAB — PSA SERPL-MCNC: 2.36 NG/ML (ref 0–4)

## 2024-04-09 PROCEDURE — 84153 ASSAY OF PSA TOTAL: CPT | Mod: GA

## 2024-04-09 PROCEDURE — 36415 COLL VENOUS BLD VENIPUNCTURE: CPT | Mod: GA

## 2024-04-15 ASSESSMENT — RHEUMATOLOGY FOLLOW-UP QUESTIONNAIRE
TEMPLE PAIN: Y
BLURRY VISION: Y
DRY MOUTH: Y
MARK ALL THE AREAS OF PAIN: 99864308
SINUS PAIN: Y
STICKING IN THROAT: Y
DURATION: <30 MINS
JOINT PAIN: WORSE WITH ACTIVITY
TINGLING: Y
CHARACTERISTIC: SAME WITH ACTIVITY
NASAL CONGESTION: Y
DRY COUGH: Y

## 2024-04-17 ENCOUNTER — OFFICE VISIT (OUTPATIENT)
Dept: RHEUMATOLOGY | Facility: MEDICAL CENTER | Age: 79
End: 2024-04-17
Attending: STUDENT IN AN ORGANIZED HEALTH CARE EDUCATION/TRAINING PROGRAM
Payer: MEDICARE

## 2024-04-17 VITALS
OXYGEN SATURATION: 95 % | DIASTOLIC BLOOD PRESSURE: 64 MMHG | HEART RATE: 88 BPM | HEIGHT: 67 IN | WEIGHT: 136 LBS | SYSTOLIC BLOOD PRESSURE: 118 MMHG | BODY MASS INDEX: 21.35 KG/M2 | TEMPERATURE: 97.6 F

## 2024-04-17 DIAGNOSIS — L40.50 PSORIATIC ARTHRITIS (HCC): Chronic | ICD-10-CM

## 2024-04-17 DIAGNOSIS — D84.9 IMMUNOSUPPRESSED STATUS (HCC): ICD-10-CM

## 2024-04-17 DIAGNOSIS — L40.0 PLAQUE PSORIASIS: ICD-10-CM

## 2024-04-17 PROCEDURE — 99212 OFFICE O/P EST SF 10 MIN: CPT | Performed by: STUDENT IN AN ORGANIZED HEALTH CARE EDUCATION/TRAINING PROGRAM

## 2024-04-17 PROCEDURE — 3078F DIAST BP <80 MM HG: CPT | Performed by: STUDENT IN AN ORGANIZED HEALTH CARE EDUCATION/TRAINING PROGRAM

## 2024-04-17 PROCEDURE — 3074F SYST BP LT 130 MM HG: CPT | Performed by: STUDENT IN AN ORGANIZED HEALTH CARE EDUCATION/TRAINING PROGRAM

## 2024-04-17 PROCEDURE — 99214 OFFICE O/P EST MOD 30 MIN: CPT | Performed by: STUDENT IN AN ORGANIZED HEALTH CARE EDUCATION/TRAINING PROGRAM

## 2024-04-17 ASSESSMENT — FIBROSIS 4 INDEX: FIB4 SCORE: 1.55

## 2024-04-17 NOTE — PROGRESS NOTES
[facility] RHEUMATOLOGY  [address], [address], [place] [address]  Phone: [phone] ? Fax: [phone]  [facility]/Health-Services/Rheumatology    FOLLOW-UP VISIT NOTE      DATE OF SERVICE: 04/17/2024         Subjective     PRIMARY CARE PRACTITIONER:  Raul Low M.D.  [address] [address]    PATIENT IDENTIFICATION:  Phuc Mcdowell  [address] [address]    YOB: 1945    MEDICAL RECORD NUMBER: [id number]          CHIEF COMPLAINT:   Chief Complaint   Patient presents with    Follow-Up     Psoriatic arthritis (HCC)       RHEUMATOLOGIC HISTORY:  Phuc Mcdowell is a 77 y.o. 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 Previously under the care of a rheumatologist in [facility] prior to relocating to [place], he initially presented on 4/19/2022 to establish care. Reported minimal joint pain in his hands (mostly DIP joints) toward the end of infliximab infusion cycle, but no new or worsening skin plaques. His joint pain was sometimes associated with 30 minutes of morning stiffness that improved with activity. Reported some weight loss due to changes he made in his diet and physical activity, but otherwise doing well.    Pertinent treatments: Cyclosporine 100 mg daily (from 1990s-4/2022, deemed unnecessary), methotrexate 20>7.5>15mg oral weekly with folic acid 1 mg daily (from 1990s, dose increased 4/2022-present, effective), infliximab 600>300 mg IV every 8 weeks (started 2000s, dose decreased 6/2023-present, effective).    Pertinent positive labs: None.    Pertinent negative labs: Negative HBV and QTB (in 5/2022), CRP, ESR, eGFR, creatinine, and LFTs (in 4/2023), and unremarkable CBC (in 8/2023). Neg PSA 2.36 (in 4/2024), infliximab activity 4.95 (in 11/2023), RF <10, CCP 5 (in 11/2023).    Pertinent XR  imaging: Pelvis (in 11/2021) with bilateral mild SI joint arthritis with partial osseous fusion. Shoulders (in 11/2021) with mild osteoarthritis of bilateral AC joints and left GH joint. Left elbow (in 7/2022) with no evidence of arthropathy. Lumbar X ray with multilevel degenerative disease and arthropathy throughout the lumbar spine. Trace degenerative L3-L4 retrolisthesis at L2-3 and anterolisthesis (in 2/2024).      INTERVAL HISTORY:  Reports interval history as noted on the questionnaire below or scanned under media tab.    Notably, has some back pain but unrelated to his rheumatologic condition.    [facility] Established Patient History Form    4/15/2024  3:37 PM PDT - Filed by Patient   MAIN REASON FOR VISIT Follow-up   INTERVAL HISTORY OF ILLNESS   Date of worsening onset:    Preceding incident/ailment:    Describe/list your symptoms: Minimal aches but overall doing quite well   Exacerbating factors:    Alleviating factors:    Helpful medications: Infliximab, methotrexate   Ineffective medications:    Severity of pain (scale of 1-10):    Personal/emotional stressors:    Josiah All The Areas Of Pain    REVIEW OF SYMPTOMS    General   Fevers    Chills    Night sweats    Malaise    Fatigue    Unintentional weight loss    Musculoskeletal   Joint pain Worse with activity   Morning stiffness duration <30 mins   Morning stiffness characteristic Same with activity   Joint swelling    Joint instability    Tendon pain    Muscle pain    Body aches    Dermatologic   Hair loss with bald spots    Hair shedding    Skin thickening    Skin plaques    Sunlight-induced skin rash    Cold-induced color changes (white, purple, red on rewarming)    Neurologic/Psychiatric   Weakness    Spasms    Tingling Yes   Burning    Numbness    Insomnia    Anxiety    Depression    Head/Eyes   Headaches    Latter-day pain Yes   Dizziness    Dry eyes    Eye pain    Eye redness    Blurry vision Yes   Vision loss    Ears/Nose   Ear pain    Ringing  in ears    Vertigo    Hearing loss    Nasal ulcers    Nosebleeds    Sinus pain Yes   Nasal congestion Yes   Snoring    Mouth/Throat   Oral ulcers    Bleeding gums    Dry mouth Yes   Cavities    Sore throat    Sticking in throat Yes   Difficulty speaking    Neck/Lymphatics   Thyroid pain    Thyroid swelling    Lymph node swelling    Cardiac/Respiratory   Chest pain with breathing    Dry cough Yes   Cough with bloody phlegm    Shortness of breath    Fast heartbeats    Irregular heartbeats    Gastrointestinal   Nausea    Vomiting    Difficulty swallowing    Heartburn    Abdominal pain    Bloody stool    Mucus stool    Genitourinary   Pelvic pain    Genital ulcers    Abnormal discharge    Burning urination    Frothy urine    Blood in urine        REVIEW OF SYSTEMS:  Except as noted in the history above, relevant review of systems with emphasis on autoimmune rheumatic diseases was otherwise negative.      ACTIVE PROBLEM LIST:  Patient Active Problem List    Diagnosis Date Noted    Acute left-sided low back pain without sciatica 02/01/2024    Need for vaccination 09/05/2023    Dyspnea on exertion 09/05/2023    Gastroesophageal reflux disease without esophagitis 05/02/2023    Immunosuppressed status ([facility]) 04/25/2023    Primary osteoarthritis of both shoulders 04/25/2023    Chronic pain of left elbow 07/26/2022    Plaque psoriasis 04/19/2022    Long-term use of immunosuppressant medication 04/17/2022    Dyslipidemia due to type 2 diabetes mellitus ([facility]) 04/12/2022    REM sleep behavior disorder 04/11/2022    Psoriatic arthritis ([facility]) 04/11/2022    Type 2 diabetes mellitus with hyperlipidemia ([facility]) 04/11/2022       PAST MEDICAL HISTORY:  Past Medical History:   Diagnosis Date    Back pain     Back pain     Chickenpox     Diabetes ([facility])     Double vision     Frequent urination     Heartburn     Hyperlipidemia     Influenza     Mumps     Nasal drainage     Painful joint     Psoriatic arthritis ([facility])     Rash     REM sleep  behavior disorder     Rheumatoid arthritis (HCC)     Sleep apnea     Sore muscles     Tonsillitis     Wears glasses        PAST SURGICAL HISTORY:  Past Surgical History:   Procedure Laterality Date    CATARACT EXTRACTION WITH IOL Bilateral 2016    [place] REMV 2ND CATARACT,CORN-SCLER SECTN         SOCIAL HISTORY:  Social History     Socioeconomic History    Marital status:     Highest education level: Bachelor's degree (e.g., BA, AB, BS)   Tobacco Use    Smoking status: Former    Smokeless tobacco: Never    Tobacco comments:     quit in 1994   Vaping Use    Vaping Use: Never used   Substance and Sexual Activity    Alcohol use: Yes     Alcohol/week: 1.2 oz     Types: 2 Glasses of wine per week     Comment: occasional glass of wine    Drug use: Never    Sexual activity: Yes     Partners: Female     Birth control/protection: None     Social Determinants of Health     Financial Resource Strain: Low Risk  (11/1/2022)    Overall Financial Resource Strain (CARDIA)     Difficulty of Paying Living Expenses: Not very hard   Food Insecurity: No Food Insecurity (11/1/2022)    Hunger Vital Sign     Worried About Running Out of Food in the Last Year: Never true     Ran Out of Food in the Last Year: Never true   Transportation Needs: No Transportation Needs (11/1/2022)    PRAPARE - Transportation     Lack of Transportation (Medical): No     Lack of Transportation (Non-Medical): No   Physical Activity: Sufficiently Active (11/1/2022)    Exercise Vital Sign     Days of Exercise per Week: 3 days     Minutes of Exercise per Session: 60 min   Stress: No Stress Concern Present (11/1/2022)    [place] [employer] - Occupational Stress Questionnaire     Feeling of Stress : Not at all   Housing Stability: Low Risk  (11/1/2022)    Housing Stability Vital Sign     Unable to Pay for Housing in the Last Year: No     Number of Places Lived in the Last Year: 2     Unstable Housing in the Last Year: No       FAMILY  "HISTORY:  Family History   Problem Relation Age of Onset    Cancer Mother         lung and brain -  at 88    Heart Disease Father         passed at 62    Hypertension Sister     Hypertension Brother     Cancer Brother        MEDICATIONS:  Current Outpatient Medications   Medication Sig    pravastatin (PRAVACHOL) 20 MG Tab TAKE ONE TABLET BY MOUTH EVERY EVENING    glimepiride (AMARYL) 2 MG Tab TAKE ONE TABLET BY MOUTH TWO TIMES A DAY.    omeprazole (PRILOSEC) 20 MG delayed-release capsule TAKE ONE CAPSULE BY MOUTH ONCE DAILY    methotrexate 2.5 MG tablet Take 6 Tablets by mouth every 7 days.    sildenafil citrate (VIAGRA) 25 MG Tab Take 25 mg by mouth 1 time a day as needed for Erectile Dysfunction.    metformin (GLUCOPHAGE) 1000 MG tablet Take 1 Tablet by mouth 2 times a day with meals.    folic acid (FOLVITE) 1 MG Tab Take 1 Tablet by mouth every day. Except the day of methotrexate.    Loratadine (KLS ALLERCLEAR PO)     inFLIXimab (REMICADE) 100 MG Recon Soln Infuse 600 mg into a venous catheter every 8 weeks.    Melatonin 10 MG Cap Take 20 mg by mouth every evening.    Acetaminophen (TYLENOL ARTHRITIS PAIN PO) Take 1,250 mg by mouth every day.       ALLERGIES:   No Known Allergies    IMMUNIZATIONS:  Immunization History   Administered Date(s) Administered    Covid-19 Mrna (Spikevax) Moderna 12+ Years 10/12/2023    Influenza Vaccine Adult HD 10/04/2022    Influenza Vaccine Quad Inj (Preserved) 10/04/2020    MODERNA SARS-COV-2 VACCINE (12+) 2021, 2021, 2021, 10/04/2022    Pneumococcal Conjugate Vaccine (Prevnar/PCV-13) 2014            Objective     Vital Signs: /64 (BP Location: Left arm, Patient Position: Sitting, BP Cuff Size: Adult)   Pulse 88   Temp 36.4 °C (97.6 °F) (Temporal)   Ht 1.702 m (5' 7\")   Wt 61.7 kg (136 lb)   SpO2 95% Body mass index is 21.3 kg/m².    General: Appears well and comfortable  Eyes: No scleral or conjunctival lesions  ENT: No apparent oral, nasal, " or ear lesions  Head/Neck: No apparent scalp or neck lesions  Cardiovascular: Regular rate and rhythm  Respiratory: Breathing quiet and unlabored  Gastrointestinal: No apparent organomegaly or abdominal masses  Integumentary: No significant cutaneous lesions or dyspigmentation  Musculoskeletal: No significant joint tenderness, periarticular soft tissue swelling, warmth, erythema, or overt synovitis; no significant restriction in range of motion of joints examined  Neurologic: No focal sensory or motor deficits  Psychiatric: Mood and affect appropriate      LABORATORY RESULTS REVIEWED AND INTERPRETED BY ME:  Lab Results   Component Value Date/Time    CREACTPROT 0.50 11/02/2023 01:37 PM    SEDRATEWES 17 11/02/2023 01:37 PM     Lab Results   Component Value Date/Time    RHEUMFACTN <10 11/02/2023 01:37 PM     Lab Results   Component Value Date/Time    WBC 6.4 11/02/2023 01:37 PM    RBC 4.22 (L) 11/02/2023 01:37 PM    HEMOGLOBIN 13.3 (L) 11/02/2023 01:37 PM    HEMATOCRIT 40.7 (L) 11/02/2023 01:37 PM    MCV 96.4 11/02/2023 01:37 PM    MCH 31.5 11/02/2023 01:37 PM    MCHC 32.7 11/02/2023 01:37 PM    RDW 50.1 (H) 11/02/2023 01:37 PM    PLATELETCT 220 11/02/2023 01:37 PM    MPV 9.8 11/02/2023 01:37 PM    NEUTS 3.20 11/02/2023 01:37 PM    LYMPHOCYTES 35.00 11/02/2023 01:37 PM    MONOCYTES 12.10 11/02/2023 01:37 PM    EOSINOPHILS 2.00 11/02/2023 01:37 PM    BASOPHILS 0.60 11/02/2023 01:37 PM     Lab Results   Component Value Date/Time    ASTSGOT 19 11/02/2023 01:37 PM    ALTSGPT 18 12/21/2023 09:58 AM    ALKPHOSPHAT 62 11/02/2023 01:37 PM    TBILIRUBIN 0.3 11/02/2023 01:37 PM    TOTPROTEIN 7.4 11/02/2023 01:37 PM    ALBUMIN 4.2 11/02/2023 01:37 PM     Lab Results   Component Value Date/Time    SODIUM 140 12/21/2023 09:58 AM    POTASSIUM 4.9 12/21/2023 09:58 AM    CHLORIDE 104 12/21/2023 09:58 AM    CO2 26 12/21/2023 09:58 AM    GLUCOSE 169 (H) 12/21/2023 09:58 AM    BUN 22 12/21/2023 09:58 AM    CREATININE 1.08 12/21/2023  09:58 AM    CALCIUM 9.9 12/21/2023 09:58 AM     Lab Results   Component Value Date/Time    MICROALBUR 1.9 08/29/2023 09:15 AM    MALBCRT 25 08/29/2023 09:15 AM     Lab Results   Component Value Date/Time    HEPBSAG Non-Reactive 05/06/2022 02:18 PM    HEPBCORTOT NonReactive 05/06/2022 02:18 PM     Lab Results   Component Value Date/Time    CHOLSTRLTOT 143 12/21/2023 09:58 AM    LDL 76 12/21/2023 09:58 AM    HDL 45 12/21/2023 09:58 AM    TRIGLYCERIDE 111 12/21/2023 09:58 AM    HBA1C 6.8 (H) 12/21/2023 09:58 AM       RADIOLOGY RESULTS REVIEWED AND INTERPRETED BY ME:  No loadable results found in the system. Pertinent non-system results, if applicable, summarized under history above.      All relevant laboratory and imaging results reported on this note were reviewed and interpreted by me.         Assessment & Plan     Phuc Mcdowell is a 78 y.o. male with history and physical as noted above whose presentation merits the following clinical impressions and recommendations:    1. Psoriatic arthritis (HCC)  Clinically quiescent disease that remains well-controlled on the current regimen of methotrexate and infliximab. No need for modification of treatment, so will continue current medication regimen given good disease control.  - CRP and ESR (to be ordered depending on his clinical trajectory)  - Continue methotrexate 15 mg oral weekly with folic acid 1 mg daily except the day of methotrexate  - Continue infliximab 300 mg IV infusion every 8 weeks    2. Plaque psoriasis  Clinically quiescent disease that remains well-controlled on the current regimen of methotrexate and infliximab.  - CRP and ESR (to be ordered depending on his clinical trajectory)  - Continue methotrexate 15 mg oral weekly with folic acid 1 mg daily except the day of methotrexate  - Continue infliximab 300 mg IV infusion every 8 weeks    3. Immunosuppressed status (HCC)  Presently with no history, physical, or laboratory evidence to suggest significant  adverse drug effects or opportunistic infections, but need routine monitoring per guidelines.  - CBC and CMP (to be done during infusion)  - Need to ascertain age-appropriate vaccines in addition to the ones listed above under immunizations      The above assessment and plan were discussed with the patient who acknowledged understanding of the plan.    FOLLOW-UP: Return in about 6 months (around 10/17/2024) for Short.         Thank you for the opportunity to participate in the care of Phuc Mcdowell.    Tawnya Aviles M.D.  Internal Medicine PGY2      ATTENDING ATTESTATION:  I personally saw and examined this patient, supervised and discussed the case with the resident who participated in the care of the patient. I have carefully reviewed the note in its entirety, made modifications as deemed appropriate, and agree with its content. I hereby attest that the documentation accurately reflects the history, physical exam, assessment and plan of care for the patient.    Piero Lewis MD, MS, FACR  Rheumatologist, [facility] Rheumatology ? [facility]   of Clinical Medicine, Department of Internal Medicine  [facility] ? [facility]

## 2024-04-17 NOTE — PATIENT INSTRUCTIONS
AFTER VISIT INSTRUCTIONS    Below are important information to help you navigate your healthcare needs and help us serve you safely and effectively:  If laboratory tests and/or imaging studies were ordered, remember to go get them done as instructed.  If new prescriptions and/or refills were sent, remember to go pick them up from your local pharmacy, or call the specialty pharmacy to request shipment.  Always take your prescription medications exactly as prescribed unless instructed otherwise.  Note that antirheumatic drugs and steroids are immunosuppressive which means they increase your risk of infections and have multiple potential adverse effects on various organ systems in your body, though many of them are uncommon.  It is important that you are up-to-date on age-appropriate immunizations, particularly shingles and bacterial/viral pneumonia vaccines, which you can request from me or your primary care provider.  Be sure to read the drug package inserts to learn about the potential side effects of your medications before you start taking them.  If you experience any significant drug side effects, stop taking the medication and notify me promptly, and depending on the severity of the side effects, consider going to an urgent care or emergency department for immediate attention.  If there are significant findings on your lab tests and imaging studies that warrant further action, I will notify you with explanations via [employer]My[employer]shart or phone call, otherwise you can view them on [employer] and let me know if you have any questions.  Note that [employer] messages are typically read during office hours and may take 1-7 business days before a response depending on the urgency of the situation and how busy my clinic schedule is.  In general, [employer] messaging is for non-urgent matters that do not require immediate attention, so for urgent matters that cannot wait, you are advised to go to an urgent care.  You are granted [facility]  access to my documentation of your visit and are encouraged to read my note which details my assessment and plan for your condition.  To learn more about your condition and rheumatic diseases evaluated and treated by rheumatologists, as well as gain access to many helpful resources about these diseases, visit our website: [email or web address].  To properly dispose of your unused, unwanted, or residual medications/supplies, visit the Drug Enforcement Administration website to locate your closest drop-off location: www.nate.gov/everyday-takeback-day.

## 2024-05-10 ENCOUNTER — OFFICE VISIT (OUTPATIENT)
Dept: SLEEP MEDICINE | Facility: MEDICAL CENTER | Age: 79
End: 2024-05-10
Attending: STUDENT IN AN ORGANIZED HEALTH CARE EDUCATION/TRAINING PROGRAM
Payer: MEDICARE

## 2024-05-10 VITALS
SYSTOLIC BLOOD PRESSURE: 110 MMHG | HEIGHT: 67 IN | WEIGHT: 132 LBS | OXYGEN SATURATION: 96 % | DIASTOLIC BLOOD PRESSURE: 62 MMHG | RESPIRATION RATE: 16 BRPM | HEART RATE: 93 BPM | BODY MASS INDEX: 20.72 KG/M2

## 2024-05-10 DIAGNOSIS — G47.52 REM SLEEP BEHAVIOR DISORDER: ICD-10-CM

## 2024-05-10 PROCEDURE — 3078F DIAST BP <80 MM HG: CPT | Performed by: STUDENT IN AN ORGANIZED HEALTH CARE EDUCATION/TRAINING PROGRAM

## 2024-05-10 PROCEDURE — 99213 OFFICE O/P EST LOW 20 MIN: CPT | Performed by: STUDENT IN AN ORGANIZED HEALTH CARE EDUCATION/TRAINING PROGRAM

## 2024-05-10 PROCEDURE — 3074F SYST BP LT 130 MM HG: CPT | Performed by: STUDENT IN AN ORGANIZED HEALTH CARE EDUCATION/TRAINING PROGRAM

## 2024-05-10 ASSESSMENT — FIBROSIS 4 INDEX: FIB4 SCORE: 1.55

## 2024-05-10 NOTE — TELEPHONE ENCOUNTER
Have we ever prescribed this med? Yes.  If yes, what date? 10/04/23    Last OV: 05/10/24 with Dr Hernandez    Next OV: 11/13/24 with Dr Hernandez    DX: REM sleep behavior disorder [G47.52]     Medications:   Requested Prescriptions     Pending Prescriptions Disp Refills    clonazePAM (KLONOPIN) 0.5 MG Tab [Pharmacy Med Name: clonazePAM Oral Tablet 0.5 MG] 90 Tablet 0     Sig: TAKE 3 TABLET BY MOUTH EVERY EVENING. FOR 90 DAYS

## 2024-05-10 NOTE — PATIENT INSTRUCTIONS
Further consideration should be made for the use of cholinesterase inhibitor (e.g., Rivastigmine) for RBD

## 2024-05-10 NOTE — PROGRESS NOTES
[facility] Follow-up Visit    CC: Follow-up regarding management of REM behavior disorder      HPI:  Phuc Mcdowell is a 78 y.o.male  with type 2 diabetes mellitus, psoriatic arthritis, psoriasis, hyperlipidemia, and REM behavior disorder.  Presents today to follow-up regarding management of REM behavior disorder.    He is continue to take melatonin 10 mg.  In addition he is taking Klonopin 1.5 to 1 mg.  He found the 2 mg in the past made him more groggy during the day.    He states that he feels that his behaviors and sleep have improved since his baseline.  However his wife still tells him about 2 to 3 days a week he is having dream enacting behavior.  He is implementing bedroom precautions.  Currently has no acute complaints at this time.      Sleep History  Last sleep study was done in September 2017 in [place] which showed REM without atonia.  Study at that time did not show obstructive sleep apnea with an overall AHI of 1.1.  Patient believes he did use CPAP up until the sleep study.       Patient Active Problem List    Diagnosis Date Noted    Acute left-sided low back pain without sciatica 02/01/2024    Need for vaccination 09/05/2023    Dyspnea on exertion 09/05/2023    Gastroesophageal reflux disease without esophagitis 05/02/2023    Immunosuppressed status (HCC) 04/25/2023    Primary osteoarthritis of both shoulders 04/25/2023    Chronic pain of left elbow 07/26/2022    Plaque psoriasis 04/19/2022    Long-term use of immunosuppressant medication 04/17/2022    Dyslipidemia due to type 2 diabetes mellitus (Former[facility]) 04/12/2022    REM sleep behavior disorder 04/11/2022    Psoriatic arthritis (Formerly [facility]) 04/11/2022    Type 2 diabetes mellitus with hyperlipidemia (Formerly [facility]) 04/11/2022       Past Medical History:   Diagnosis Date    Back pain     Back pain     Chickenpox     Diabetes (Formerly [facility])     Double vision     Frequent urination     Heartburn     Hyperlipidemia     Influenza     Mumps     Nasal drainage      Painful joint     Psoriatic arthritis (HCC)     Rash     REM sleep behavior disorder     Rheumatoid arthritis (HCC)     Sleep apnea     Sore muscles     Tonsillitis     Wears glasses         Past Surgical History:   Procedure Laterality Date    CATARACT EXTRACTION WITH IOL Bilateral 2016    SC REMV 2ND CATARACT,CORN-SCLER SECTN         Family History   Problem Relation Age of Onset    Cancer Mother         lung and brain -  at 88    Heart Disease Father         passed at 62    Hypertension Sister     Hypertension Brother     Cancer Brother        Social History     Socioeconomic History    Marital status:      Spouse name: Not on file    Number of children: Not on file    Years of education: Not on file    Highest education level: Bachelor's degree (e.g., BA, AB, BS)   Occupational History    Not on file   Tobacco Use    Smoking status: Former    Smokeless tobacco: Never    Tobacco comments:     quit in    Vaping Use    Vaping Use: Never used   Substance and Sexual Activity    Alcohol use: Yes     Alcohol/week: 1.2 oz     Types: 2 Glasses of wine per week     Comment: occasional glass of wine    Drug use: Never    Sexual activity: Yes     Partners: Female     Birth control/protection: None   Other Topics Concern    Not on file   Social History Narrative    Not on file     Social Determinants of Health     Financial Resource Strain: Low Risk  (2022)    Overall Financial Resource Strain (CARDIA)     Difficulty of Paying Living Expenses: Not very hard   Food Insecurity: No Food Insecurity (2022)    Hunger Vital Sign     Worried About Running Out of Food in the Last Year: Never true     Ran Out of Food in the Last Year: Never true   Transportation Needs: No Transportation Needs (2022)    PRAPARE - Transportation     Lack of Transportation (Medical): No     Lack of Transportation (Non-Medical): No   Physical Activity: Sufficiently Active (2022)    Exercise Vital Sign     Days of  Exercise per Week: 3 days     Minutes of Exercise per Session: 60 min   Stress: No Stress Concern Present (11/1/2022)    [employer] [place] [employer] - Occupational Stress Questionnaire     Feeling of Stress : Not at all   Social Connections: Not on file   Intimate Partner Violence: Not on file   Housing Stability: Low Risk  (11/1/2022)    Housing Stability Vital Sign     Unable to Pay for Housing in the Last Year: No     Number of Places Lived in the Last Year: 2     Unstable Housing in the Last Year: No       Current Outpatient Medications   Medication Sig Dispense Refill    pravastatin (PRAVACHOL) 20 MG Tab TAKE ONE TABLET BY MOUTH EVERY EVENING 90 Tablet 0    glimepiride (AMARYL) 2 MG Tab TAKE ONE TABLET BY MOUTH TWO TIMES A DAY. 180 Tablet 0    omeprazole (PRILOSEC) 20 MG delayed-release capsule TAKE ONE CAPSULE BY MOUTH ONCE DAILY 90 Capsule 0    methotrexate 2.5 MG tablet Take 6 Tablets by mouth every 7 days. 78 Tablet 3    sildenafil citrate (VIAGRA) 25 MG Tab Take 25 mg by mouth 1 time a day as needed for Erectile Dysfunction.      metformin (GLUCOPHAGE) 1000 MG tablet Take 1 Tablet by mouth 2 times a day with meals. 180 Tablet 3    folic acid (FOLVITE) 1 MG Tab Take 1 Tablet by mouth every day. Except the day of methotrexate. 90 Tablet 3    Loratadine (KLS ALLERCLEAR PO)       inFLIXimab (REMICADE) 100 MG Recon Soln Infuse 600 mg into a venous catheter every 8 weeks.      Melatonin 10 MG Cap Take 20 mg by mouth every evening.      Acetaminophen (TYLENOL ARTHRITIS PAIN PO) Take 1,250 mg by mouth every day.       No current facility-administered medications for this visit.        ALLERGIES: Patient has no known allergies.    ROS  Constitutional: Denies fevers, Denies weight changes  Ears/Nose/Throat/Mouth: Denies nasal congestion or sore throat   Cardiovascular: Denies chest pain  Respiratory: Denies shortness of breath, Denies cough  Gastrointestinal/Hepatic: Denies nausea, vomiting  Sleep: see  "HPI      PHYSICAL EXAM  /62 (BP Location: Left arm, Patient Position: Sitting, BP Cuff Size: Adult)   Pulse 93   Resp 16   Ht 1.702 m (5' 7\")   Wt 59.9 kg (132 lb)   SpO2 96%   BMI 20.67 kg/m²   Appearance: Well-nourished, well-developed, no acute distress  Eyes:  No scleral icterus , EOMI  Musculoskeletal:  Grossly normal; gait and station normal; digits and nails normal  Skin:  No rashes, petechiae, cyanosis  Neurologic: without focal signs; oriented to person, time, place, and purpose; judgement intact      Medical Decision Making   Assessment and Plan  Phuc Mcdowell is a 78 y.o.male  with type 2 diabetes mellitus, psoriatic arthritis, psoriasis, hyperlipidemia, and REM behavior disorder.  Presents today to follow-up regarding management of REM behavior disorder.    The medical record was reviewed.    REM behavior disorder  Patient continues to take melatonin 10 mg and Klonopin 1 to 1.5 mg at bedtime.  On this regimen he does feel that his behaviors are less.  However his wife still works reports he is having 2-3 behaviors a week.  He has implemented bedroom precautions.  Discussed alternatives to treating his REM behavior disorder.  Advised of rivastigmine which has been shown to lessen behaviors in certain patients with REM behavior disorder.  Advised patient to research this further if he is comfortable starting rivastigmine to see if behaviors become less.  Advised him to discuss with his wife about if trying to control his behaviors better as a priority to them.  The patient feels he is well at his current regimen we can make no changes at this time and continue with his current regimen.    Plan  -Continue melatonin 10 mg at bedtime  -Continue Klonopin 1 mg to 1.5 mg at bedtime  -Consider starting rivastigmine in the future to see if we can decrease behavior frequency  -Continue to implement bedroom precautions    Have advised the patient to follow up with the appropriate healthcare " practitioners for all other medical problems and issues.    Return in about 6 months (around 11/10/2024).      Please note portions of this record was created using voice recognition software. I have made every reasonable attempt to correct obvious errors, but I expect that there are errors of grammar and possibly content I did not discover before finalizing the note.

## 2024-05-13 RX ORDER — CLONAZEPAM 0.5 MG/1
TABLET ORAL
Qty: 90 TABLET | Refills: 0 | Status: SHIPPED | OUTPATIENT
Start: 2024-05-13 | End: 2024-05-15 | Stop reason: SDUPTHER

## 2024-05-14 ENCOUNTER — OUTPATIENT INFUSION SERVICES (OUTPATIENT)
Dept: ONCOLOGY | Facility: MEDICAL CENTER | Age: 79
End: 2024-05-14
Attending: STUDENT IN AN ORGANIZED HEALTH CARE EDUCATION/TRAINING PROGRAM
Payer: MEDICARE

## 2024-05-14 ENCOUNTER — TELEPHONE (OUTPATIENT)
Dept: SLEEP MEDICINE | Facility: MEDICAL CENTER | Age: 79
End: 2024-05-14

## 2024-05-14 VITALS
BODY MASS INDEX: 21.97 KG/M2 | RESPIRATION RATE: 16 BRPM | SYSTOLIC BLOOD PRESSURE: 124 MMHG | HEIGHT: 66 IN | HEART RATE: 92 BPM | DIASTOLIC BLOOD PRESSURE: 64 MMHG | WEIGHT: 136.69 LBS | OXYGEN SATURATION: 95 % | TEMPERATURE: 97.8 F

## 2024-05-14 DIAGNOSIS — G47.52 REM SLEEP BEHAVIOR DISORDER: ICD-10-CM

## 2024-05-14 DIAGNOSIS — L40.50 PSORIATIC ARTHRITIS (HCC): ICD-10-CM

## 2024-05-14 DIAGNOSIS — L40.0 PLAQUE PSORIASIS: ICD-10-CM

## 2024-05-14 LAB
BASOPHILS # BLD AUTO: 0.7 % (ref 0–1.8)
BASOPHILS # BLD: 0.04 K/UL (ref 0–0.12)
EOSINOPHIL # BLD AUTO: 0.08 K/UL (ref 0–0.51)
EOSINOPHIL NFR BLD: 1.4 % (ref 0–6.9)
ERYTHROCYTE [DISTWIDTH] IN BLOOD BY AUTOMATED COUNT: 43.8 FL (ref 35.9–50)
HBV CORE AB SERPL QL IA: NONREACTIVE
HBV SURFACE AG SER QL: NORMAL
HCT VFR BLD AUTO: 37.7 % (ref 42–52)
HGB BLD-MCNC: 13.5 G/DL (ref 14–18)
IMM GRANULOCYTES # BLD AUTO: 0.01 K/UL (ref 0–0.11)
IMM GRANULOCYTES NFR BLD AUTO: 0.2 % (ref 0–0.9)
LYMPHOCYTES # BLD AUTO: 2.59 K/UL (ref 1–4.8)
LYMPHOCYTES NFR BLD: 45.4 % (ref 22–41)
MCH RBC QN AUTO: 32.5 PG (ref 27–33)
MCHC RBC AUTO-ENTMCNC: 35.8 G/DL (ref 32.3–36.5)
MCV RBC AUTO: 90.6 FL (ref 81.4–97.8)
MONOCYTES # BLD AUTO: 0.57 K/UL (ref 0–0.85)
MONOCYTES NFR BLD AUTO: 10 % (ref 0–13.4)
NEUTROPHILS # BLD AUTO: 2.41 K/UL (ref 1.82–7.42)
NEUTROPHILS NFR BLD: 42.3 % (ref 44–72)
NRBC # BLD AUTO: 0 K/UL
NRBC BLD-RTO: 0 /100 WBC (ref 0–0.2)
OUTPT INFUS CBC COMMENT OICOM: ABNORMAL
PLATELET # BLD AUTO: 182 K/UL (ref 164–446)
PMV BLD AUTO: 9.6 FL (ref 9–12.9)
RBC # BLD AUTO: 4.16 M/UL (ref 4.7–6.1)
WBC # BLD AUTO: 5.7 K/UL (ref 4.8–10.8)

## 2024-05-14 RX ORDER — DIPHENHYDRAMINE HYDROCHLORIDE 50 MG/ML
50 INJECTION INTRAMUSCULAR; INTRAVENOUS PRN
OUTPATIENT
Start: 2024-07-02

## 2024-05-14 RX ORDER — EPINEPHRINE 1 MG/ML(1)
0.5 AMPUL (ML) INJECTION PRN
OUTPATIENT
Start: 2024-07-02

## 2024-05-14 RX ORDER — ACETAMINOPHEN 325 MG/1
650 TABLET ORAL ONCE
OUTPATIENT
Start: 2024-07-02

## 2024-05-14 RX ORDER — 0.9 % SODIUM CHLORIDE 0.9 %
10 VIAL (ML) INJECTION PRN
OUTPATIENT
Start: 2024-07-02

## 2024-05-14 RX ORDER — METHYLPREDNISOLONE SODIUM SUCCINATE 125 MG/2ML
125 INJECTION, POWDER, LYOPHILIZED, FOR SOLUTION INTRAMUSCULAR; INTRAVENOUS PRN
OUTPATIENT
Start: 2024-07-02

## 2024-05-14 RX ORDER — 0.9 % SODIUM CHLORIDE 0.9 %
VIAL (ML) INJECTION PRN
OUTPATIENT
Start: 2024-07-02

## 2024-05-14 RX ORDER — SODIUM CHLORIDE 9 MG/ML
INJECTION, SOLUTION INTRAVENOUS CONTINUOUS
Status: DISCONTINUED | OUTPATIENT
Start: 2024-05-14 | End: 2024-05-14 | Stop reason: HOSPADM

## 2024-05-14 RX ORDER — ACETAMINOPHEN 325 MG/1
650 TABLET ORAL ONCE
Status: DISCONTINUED | OUTPATIENT
Start: 2024-05-14 | End: 2024-05-14 | Stop reason: HOSPADM

## 2024-05-14 RX ORDER — 0.9 % SODIUM CHLORIDE 0.9 %
3 VIAL (ML) INJECTION PRN
OUTPATIENT
Start: 2024-07-02

## 2024-05-14 RX ORDER — SODIUM CHLORIDE 9 MG/ML
INJECTION, SOLUTION INTRAVENOUS CONTINUOUS
OUTPATIENT
Start: 2024-07-02

## 2024-05-14 RX ADMIN — INFLIXIMAB-AXXQ 300 MG: 100 INJECTION, POWDER, LYOPHILIZED, FOR SOLUTION INTRAVENOUS at 11:50

## 2024-05-14 ASSESSMENT — FIBROSIS 4 INDEX: FIB4 SCORE: 1.55

## 2024-05-14 NOTE — TELEPHONE ENCOUNTER
Radha from [facility] pharmacy in [place] called regarding the pt Rx for clonazepam. The rx states to dispense  90 tables and take 3 tablets every evening for 90 days supplies. But insurance will only cover a 30 day supply so they are calling to see if Dr. Hernandez will like to change Rx or keep it and do a prior auth. Please advise.

## 2024-05-14 NOTE — PROGRESS NOTES
Pt arrived ambulatory to [facility] for Avsola infusion. Pt denied fever, signs or symptoms of infection or acute illness today. POC discussed and pt verbalized understanding. Last Hep B and TB results are not within the last 2 years; new orders placed for TB and hep B labs. Pt does not report any live vaccines within the last 4 weeks.     PIV established  and labs drawn at this time; pt tolerated well. Patient refused premedications. Avsola administered per MAR over 60 min; pt tolerated well. No signs or symptoms of reaction or complications noted. PIV flushed and removed with tip intact; pt tolerated well. Gauze and paper tape applied to site.     Follow-up care discussed and next appointments confirmed for pt; pt verbalized understanding. Pt discharged home to self care in no apparent distress at this time.

## 2024-05-15 LAB
GAMMA INTERFERON BACKGROUND BLD IA-ACNC: 0.12 IU/ML
M TB IFN-G BLD-IMP: NEGATIVE
M TB IFN-G CD4+ BCKGRND COR BLD-ACNC: -0.03 IU/ML
MITOGEN IGNF BCKGRD COR BLD-ACNC: >10 IU/ML
QFT TB2 - NIL TBQ2: -0.03 IU/ML

## 2024-05-15 RX ORDER — CLONAZEPAM 0.5 MG/1
1.5 TABLET ORAL NIGHTLY
Qty: 90 TABLET | Refills: 0 | Status: SHIPPED | OUTPATIENT
Start: 2024-05-15 | End: 2024-06-16 | Stop reason: SDUPTHER

## 2024-05-15 RX ORDER — CLONAZEPAM 0.5 MG
TABLET ORAL
Qty: 90 TABLET | Refills: 0 | OUTPATIENT
Start: 2024-05-15

## 2024-06-16 DIAGNOSIS — G47.52 REM SLEEP BEHAVIOR DISORDER: ICD-10-CM

## 2024-06-17 RX ORDER — CLONAZEPAM 0.5 MG/1
1.5 TABLET ORAL NIGHTLY
Qty: 90 TABLET | Refills: 0 | Status: SHIPPED | OUTPATIENT
Start: 2024-06-17 | End: 2024-07-17

## 2024-06-17 NOTE — TELEPHONE ENCOUNTER
Have we ever prescribed this med? Yes.  If yes, what date? 05/15/24    Last OV: 5/10/24 with Dr Hernandez    Next OV: 11/13/24 with Dr Hernandez    DX: REM sleep behavior disorder [G47.52]     Medications:   Requested Prescriptions     Pending Prescriptions Disp Refills    clonazePAM (KLONOPIN) 0.5 MG Tab 90 Tablet 0     Sig: Take 3 Tablets by mouth every evening for 30 days.

## 2024-07-02 DIAGNOSIS — E78.5 TYPE 2 DIABETES MELLITUS WITH HYPERLIPIDEMIA (HCC): ICD-10-CM

## 2024-07-02 DIAGNOSIS — E11.69 TYPE 2 DIABETES MELLITUS WITH HYPERLIPIDEMIA (HCC): ICD-10-CM

## 2024-07-02 DIAGNOSIS — E78.2 MIXED HYPERLIPIDEMIA: ICD-10-CM

## 2024-07-02 RX ORDER — GLIMEPIRIDE 2 MG/1
2 TABLET ORAL 2 TIMES DAILY
Qty: 180 TABLET | Refills: 0 | Status: SHIPPED | OUTPATIENT
Start: 2024-07-02

## 2024-07-02 RX ORDER — PRAVASTATIN SODIUM 20 MG
20 TABLET ORAL EVERY EVENING
Qty: 90 TABLET | Refills: 0 | Status: SHIPPED | OUTPATIENT
Start: 2024-07-02

## 2024-07-02 RX ORDER — OMEPRAZOLE 20 MG/1
20 CAPSULE, DELAYED RELEASE ORAL DAILY
Qty: 90 CAPSULE | Refills: 0 | Status: SHIPPED | OUTPATIENT
Start: 2024-07-02

## 2024-07-09 ENCOUNTER — OUTPATIENT INFUSION SERVICES (OUTPATIENT)
Dept: ONCOLOGY | Facility: MEDICAL CENTER | Age: 79
End: 2024-07-09
Attending: STUDENT IN AN ORGANIZED HEALTH CARE EDUCATION/TRAINING PROGRAM
Payer: MEDICARE

## 2024-07-09 VITALS
WEIGHT: 137.79 LBS | HEART RATE: 80 BPM | BODY MASS INDEX: 22.14 KG/M2 | OXYGEN SATURATION: 97 % | DIASTOLIC BLOOD PRESSURE: 82 MMHG | RESPIRATION RATE: 18 BRPM | TEMPERATURE: 97.3 F | SYSTOLIC BLOOD PRESSURE: 132 MMHG | HEIGHT: 66 IN

## 2024-07-09 DIAGNOSIS — L40.0 PLAQUE PSORIASIS: ICD-10-CM

## 2024-07-09 DIAGNOSIS — L40.50 PSORIATIC ARTHRITIS (HCC): ICD-10-CM

## 2024-07-09 PROCEDURE — 96413 CHEMO IV INFUSION 1 HR: CPT

## 2024-07-09 PROCEDURE — 700105 HCHG RX REV CODE 258: Performed by: STUDENT IN AN ORGANIZED HEALTH CARE EDUCATION/TRAINING PROGRAM

## 2024-07-09 PROCEDURE — 700111 HCHG RX REV CODE 636 W/ 250 OVERRIDE (IP): Mod: JZ,JG | Performed by: STUDENT IN AN ORGANIZED HEALTH CARE EDUCATION/TRAINING PROGRAM

## 2024-07-09 RX ORDER — EPINEPHRINE 1 MG/ML(1)
0.5 AMPUL (ML) INJECTION PRN
OUTPATIENT
Start: 2024-09-03

## 2024-07-09 RX ORDER — 0.9 % SODIUM CHLORIDE 0.9 %
3 VIAL (ML) INJECTION PRN
OUTPATIENT
Start: 2024-09-03

## 2024-07-09 RX ORDER — DONEPEZIL HYDROCHLORIDE 5 MG/1
TABLET, FILM COATED ORAL
COMMUNITY
Start: 2024-06-12

## 2024-07-09 RX ORDER — ACETAMINOPHEN 325 MG/1
650 TABLET ORAL ONCE
OUTPATIENT
Start: 2024-09-03

## 2024-07-09 RX ORDER — RIZATRIPTAN BENZOATE 10 MG/1
TABLET, ORALLY DISINTEGRATING ORAL
COMMUNITY
Start: 2024-05-09

## 2024-07-09 RX ORDER — METHYLPREDNISOLONE SODIUM SUCCINATE 125 MG/2ML
125 INJECTION, POWDER, LYOPHILIZED, FOR SOLUTION INTRAMUSCULAR; INTRAVENOUS PRN
OUTPATIENT
Start: 2024-09-03

## 2024-07-09 RX ORDER — ACETAMINOPHEN 325 MG/1
650 TABLET ORAL ONCE
Status: DISCONTINUED | OUTPATIENT
Start: 2024-07-09 | End: 2024-07-09 | Stop reason: HOSPADM

## 2024-07-09 RX ORDER — 0.9 % SODIUM CHLORIDE 0.9 %
10 VIAL (ML) INJECTION PRN
OUTPATIENT
Start: 2024-09-03

## 2024-07-09 RX ORDER — 0.9 % SODIUM CHLORIDE 0.9 %
VIAL (ML) INJECTION PRN
OUTPATIENT
Start: 2024-09-03

## 2024-07-09 RX ORDER — IPRATROPIUM BROMIDE 21 UG/1
SPRAY, METERED NASAL
COMMUNITY
Start: 2024-07-08

## 2024-07-09 RX ORDER — DIPHENHYDRAMINE HYDROCHLORIDE 50 MG/ML
50 INJECTION INTRAMUSCULAR; INTRAVENOUS PRN
OUTPATIENT
Start: 2024-09-03

## 2024-07-09 RX ORDER — SODIUM CHLORIDE 9 MG/ML
INJECTION, SOLUTION INTRAVENOUS CONTINUOUS
OUTPATIENT
Start: 2024-09-03

## 2024-07-09 RX ADMIN — INFLIXIMAB-AXXQ 300 MG: 100 INJECTION, POWDER, LYOPHILIZED, FOR SOLUTION INTRAVENOUS at 12:14

## 2024-07-09 ASSESSMENT — FIBROSIS 4 INDEX: FIB4 SCORE: 1.87

## 2024-07-17 DIAGNOSIS — G47.52 REM SLEEP BEHAVIOR DISORDER: ICD-10-CM

## 2024-07-18 RX ORDER — CLONAZEPAM 0.5 MG/1
1.5 TABLET ORAL EVERY EVENING
Qty: 90 TABLET | Refills: 0 | Status: SHIPPED | OUTPATIENT
Start: 2024-07-18 | End: 2024-08-17

## 2024-07-31 DIAGNOSIS — E11.69 DYSLIPIDEMIA DUE TO TYPE 2 DIABETES MELLITUS (HCC): Chronic | ICD-10-CM

## 2024-07-31 DIAGNOSIS — Z12.5 SCREENING FOR MALIGNANT NEOPLASM OF PROSTATE: ICD-10-CM

## 2024-07-31 DIAGNOSIS — E78.5 TYPE 2 DIABETES MELLITUS WITH HYPERLIPIDEMIA (HCC): Chronic | ICD-10-CM

## 2024-07-31 DIAGNOSIS — E78.5 DYSLIPIDEMIA DUE TO TYPE 2 DIABETES MELLITUS (HCC): Chronic | ICD-10-CM

## 2024-07-31 DIAGNOSIS — E11.69 TYPE 2 DIABETES MELLITUS WITH HYPERLIPIDEMIA (HCC): Chronic | ICD-10-CM

## 2024-08-10 ENCOUNTER — HOSPITAL ENCOUNTER (OUTPATIENT)
Dept: LAB | Facility: MEDICAL CENTER | Age: 79
End: 2024-08-10
Attending: INTERNAL MEDICINE
Payer: MEDICARE

## 2024-08-10 DIAGNOSIS — R80.0 ISOLATED PROTEINURIA WITHOUT SPECIFIC MORPHOLOGIC LESION: ICD-10-CM

## 2024-08-10 DIAGNOSIS — E11.69 DYSLIPIDEMIA DUE TO TYPE 2 DIABETES MELLITUS (HCC): Chronic | ICD-10-CM

## 2024-08-10 DIAGNOSIS — E11.69 TYPE 2 DIABETES MELLITUS WITH HYPERLIPIDEMIA (HCC): Chronic | ICD-10-CM

## 2024-08-10 DIAGNOSIS — E78.5 DYSLIPIDEMIA DUE TO TYPE 2 DIABETES MELLITUS (HCC): Chronic | ICD-10-CM

## 2024-08-10 DIAGNOSIS — E78.5 TYPE 2 DIABETES MELLITUS WITH HYPERLIPIDEMIA (HCC): Chronic | ICD-10-CM

## 2024-08-10 LAB
ALT SERPL-CCNC: 21 U/L (ref 2–50)
ANION GAP SERPL CALC-SCNC: 15 MMOL/L (ref 7–16)
BUN SERPL-MCNC: 22 MG/DL (ref 8–22)
CALCIUM SERPL-MCNC: 10.3 MG/DL (ref 8.5–10.5)
CHLORIDE SERPL-SCNC: 99 MMOL/L (ref 96–112)
CHOLEST SERPL-MCNC: 152 MG/DL (ref 100–199)
CO2 SERPL-SCNC: 24 MMOL/L (ref 20–33)
CREAT SERPL-MCNC: 1.2 MG/DL (ref 0.5–1.4)
CREAT UR-MCNC: 88.44 MG/DL
EST. AVERAGE GLUCOSE BLD GHB EST-MCNC: 154 MG/DL
FASTING STATUS PATIENT QL REPORTED: NORMAL
GFR SERPLBLD CREATININE-BSD FMLA CKD-EPI: 62 ML/MIN/1.73 M 2
GLUCOSE SERPL-MCNC: 195 MG/DL (ref 65–99)
HBA1C MFR BLD: 7 % (ref 4–5.6)
HDLC SERPL-MCNC: 44 MG/DL
LDLC SERPL CALC-MCNC: 78 MG/DL
MICROALBUMIN UR-MCNC: 6.2 MG/DL
MICROALBUMIN/CREAT UR: 70 MG/G (ref 0–30)
POTASSIUM SERPL-SCNC: 4.8 MMOL/L (ref 3.6–5.5)
SODIUM SERPL-SCNC: 138 MMOL/L (ref 135–145)
TRIGL SERPL-MCNC: 149 MG/DL (ref 0–149)
TSH SERPL-ACNC: 2.18 UIU/ML (ref 0.35–5.5)

## 2024-08-10 PROCEDURE — 84460 ALANINE AMINO (ALT) (SGPT): CPT

## 2024-08-10 PROCEDURE — 80048 BASIC METABOLIC PNL TOTAL CA: CPT

## 2024-08-10 PROCEDURE — 84443 ASSAY THYROID STIM HORMONE: CPT

## 2024-08-10 PROCEDURE — 83036 HEMOGLOBIN GLYCOSYLATED A1C: CPT | Mod: GA

## 2024-08-10 PROCEDURE — 80061 LIPID PANEL: CPT

## 2024-08-10 PROCEDURE — 36415 COLL VENOUS BLD VENIPUNCTURE: CPT | Mod: GA

## 2024-08-10 PROCEDURE — 82043 UR ALBUMIN QUANTITATIVE: CPT

## 2024-08-10 PROCEDURE — 82570 ASSAY OF URINE CREATININE: CPT

## 2024-08-12 ENCOUNTER — HOSPITAL ENCOUNTER (OUTPATIENT)
Dept: LAB | Facility: MEDICAL CENTER | Age: 79
End: 2024-08-12
Attending: INTERNAL MEDICINE
Payer: MEDICARE

## 2024-08-12 DIAGNOSIS — E11.69 TYPE 2 DIABETES MELLITUS WITH HYPERLIPIDEMIA (HCC): Chronic | ICD-10-CM

## 2024-08-12 DIAGNOSIS — E78.5 TYPE 2 DIABETES MELLITUS WITH HYPERLIPIDEMIA (HCC): Chronic | ICD-10-CM

## 2024-08-12 DIAGNOSIS — Z11.59 NEED FOR HEPATITIS C SCREENING TEST: ICD-10-CM

## 2024-08-12 DIAGNOSIS — E78.5 DYSLIPIDEMIA DUE TO TYPE 2 DIABETES MELLITUS (HCC): Chronic | ICD-10-CM

## 2024-08-12 DIAGNOSIS — E11.69 DYSLIPIDEMIA DUE TO TYPE 2 DIABETES MELLITUS (HCC): Chronic | ICD-10-CM

## 2024-08-12 DIAGNOSIS — R80.0 ISOLATED PROTEINURIA WITHOUT SPECIFIC MORPHOLOGIC LESION: ICD-10-CM

## 2024-08-12 LAB
CREAT UR-MCNC: 56.79 MG/DL
PROT UR-MCNC: 14 MG/DL (ref 0–15)
PROT/CREAT UR: 247 MG/G (ref 15–68)

## 2024-08-12 PROCEDURE — 84156 ASSAY OF PROTEIN URINE: CPT

## 2024-08-12 PROCEDURE — 82570 ASSAY OF URINE CREATININE: CPT

## 2024-08-13 DIAGNOSIS — R80.0 ISOLATED PROTEINURIA WITHOUT SPECIFIC MORPHOLOGIC LESION: ICD-10-CM

## 2024-09-03 ENCOUNTER — OUTPATIENT INFUSION SERVICES (OUTPATIENT)
Dept: ONCOLOGY | Facility: MEDICAL CENTER | Age: 79
End: 2024-09-03
Attending: STUDENT IN AN ORGANIZED HEALTH CARE EDUCATION/TRAINING PROGRAM
Payer: MEDICARE

## 2024-09-03 VITALS
WEIGHT: 139.99 LBS | BODY MASS INDEX: 22.5 KG/M2 | TEMPERATURE: 97.1 F | HEART RATE: 73 BPM | HEIGHT: 66 IN | OXYGEN SATURATION: 97 % | SYSTOLIC BLOOD PRESSURE: 123 MMHG | DIASTOLIC BLOOD PRESSURE: 70 MMHG | RESPIRATION RATE: 16 BRPM

## 2024-09-03 DIAGNOSIS — L40.50 PSORIATIC ARTHRITIS (HCC): ICD-10-CM

## 2024-09-03 DIAGNOSIS — L40.0 PLAQUE PSORIASIS: ICD-10-CM

## 2024-09-03 PROCEDURE — 96365 THER/PROPH/DIAG IV INF INIT: CPT

## 2024-09-03 PROCEDURE — 700111 HCHG RX REV CODE 636 W/ 250 OVERRIDE (IP): Mod: JZ,JG | Performed by: STUDENT IN AN ORGANIZED HEALTH CARE EDUCATION/TRAINING PROGRAM

## 2024-09-03 PROCEDURE — 96413 CHEMO IV INFUSION 1 HR: CPT

## 2024-09-03 PROCEDURE — 700105 HCHG RX REV CODE 258: Performed by: STUDENT IN AN ORGANIZED HEALTH CARE EDUCATION/TRAINING PROGRAM

## 2024-09-03 RX ORDER — METHYLPREDNISOLONE SODIUM SUCCINATE 125 MG/2ML
125 INJECTION, POWDER, LYOPHILIZED, FOR SOLUTION INTRAMUSCULAR; INTRAVENOUS PRN
OUTPATIENT
Start: 2024-10-29

## 2024-09-03 RX ORDER — 0.9 % SODIUM CHLORIDE 0.9 %
3 VIAL (ML) INJECTION PRN
OUTPATIENT
Start: 2024-10-29

## 2024-09-03 RX ORDER — 0.9 % SODIUM CHLORIDE 0.9 %
10 VIAL (ML) INJECTION PRN
OUTPATIENT
Start: 2024-10-29

## 2024-09-03 RX ORDER — EPINEPHRINE 1 MG/ML(1)
0.5 AMPUL (ML) INJECTION PRN
OUTPATIENT
Start: 2024-10-29

## 2024-09-03 RX ORDER — 0.9 % SODIUM CHLORIDE 0.9 %
VIAL (ML) INJECTION PRN
OUTPATIENT
Start: 2024-10-29

## 2024-09-03 RX ORDER — ACETAMINOPHEN 325 MG/1
650 TABLET ORAL ONCE
Status: DISCONTINUED | OUTPATIENT
Start: 2024-09-03 | End: 2024-09-03 | Stop reason: HOSPADM

## 2024-09-03 RX ORDER — ACETAMINOPHEN 325 MG/1
650 TABLET ORAL ONCE
OUTPATIENT
Start: 2024-10-29

## 2024-09-03 RX ORDER — SODIUM CHLORIDE 9 MG/ML
INJECTION, SOLUTION INTRAVENOUS CONTINUOUS
OUTPATIENT
Start: 2024-10-29

## 2024-09-03 RX ORDER — DIPHENHYDRAMINE HYDROCHLORIDE 50 MG/ML
50 INJECTION INTRAMUSCULAR; INTRAVENOUS PRN
OUTPATIENT
Start: 2024-10-29

## 2024-09-03 RX ADMIN — INFLIXIMAB-AXXQ 300 MG: 100 INJECTION, POWDER, LYOPHILIZED, FOR SOLUTION INTRAVENOUS at 12:05

## 2024-09-03 ASSESSMENT — FIBROSIS 4 INDEX: FIB4 SCORE: 1.73

## 2024-09-03 NOTE — PROGRESS NOTES
Mr Mcdowell is here today for Avsola infusion. He has no new complaints to report.     22 g IV placed to his right arm. Flushed well with good blood return.     Mr Mcdowell declined the premedications today.     Avsola administered per MAR and was tolerated well.     IV was removed, gauze and coban applied to the site.    Mr Mcdowell was discharged in stable condition.

## 2024-09-16 ENCOUNTER — OFFICE VISIT (OUTPATIENT)
Dept: MEDICAL GROUP | Facility: PHYSICIAN GROUP | Age: 79
End: 2024-09-16
Payer: MEDICARE

## 2024-09-16 VITALS
SYSTOLIC BLOOD PRESSURE: 118 MMHG | BODY MASS INDEX: 22.34 KG/M2 | DIASTOLIC BLOOD PRESSURE: 68 MMHG | RESPIRATION RATE: 16 BRPM | OXYGEN SATURATION: 97 % | TEMPERATURE: 98 F | HEIGHT: 66 IN | WEIGHT: 139 LBS | HEART RATE: 82 BPM

## 2024-09-16 DIAGNOSIS — T78.40XD ALLERGY, SUBSEQUENT ENCOUNTER: ICD-10-CM

## 2024-09-16 DIAGNOSIS — E78.5 DYSLIPIDEMIA DUE TO TYPE 2 DIABETES MELLITUS (HCC): Chronic | ICD-10-CM

## 2024-09-16 DIAGNOSIS — G43.009 MIGRAINE WITHOUT AURA AND WITHOUT STATUS MIGRAINOSUS, NOT INTRACTABLE: ICD-10-CM

## 2024-09-16 DIAGNOSIS — R80.0 ISOLATED PROTEINURIA WITHOUT SPECIFIC MORPHOLOGIC LESION: ICD-10-CM

## 2024-09-16 DIAGNOSIS — G47.52 REM SLEEP BEHAVIOR DISORDER: Chronic | ICD-10-CM

## 2024-09-16 DIAGNOSIS — D64.9 ANEMIA, UNSPECIFIED TYPE: ICD-10-CM

## 2024-09-16 DIAGNOSIS — E11.69 TYPE 2 DIABETES MELLITUS WITH HYPERLIPIDEMIA (HCC): Chronic | ICD-10-CM

## 2024-09-16 DIAGNOSIS — Z11.59 NEED FOR HEPATITIS C SCREENING TEST: ICD-10-CM

## 2024-09-16 DIAGNOSIS — L40.50 PSORIATIC ARTHRITIS (HCC): Chronic | ICD-10-CM

## 2024-09-16 DIAGNOSIS — E78.5 TYPE 2 DIABETES MELLITUS WITH HYPERLIPIDEMIA (HCC): Chronic | ICD-10-CM

## 2024-09-16 DIAGNOSIS — K21.9 GASTROESOPHAGEAL REFLUX DISEASE WITHOUT ESOPHAGITIS: Chronic | ICD-10-CM

## 2024-09-16 DIAGNOSIS — R53.82 CHRONIC FATIGUE: ICD-10-CM

## 2024-09-16 DIAGNOSIS — D84.9 IMMUNOSUPPRESSED STATUS (HCC): ICD-10-CM

## 2024-09-16 DIAGNOSIS — E11.69 DYSLIPIDEMIA DUE TO TYPE 2 DIABETES MELLITUS (HCC): Chronic | ICD-10-CM

## 2024-09-16 PROBLEM — G43.909 MIGRAINE: Status: ACTIVE | Noted: 2024-09-16

## 2024-09-16 PROBLEM — G43.909 MIGRAINE: Chronic | Status: ACTIVE | Noted: 2024-09-16

## 2024-09-16 PROBLEM — T78.40XA ALLERGIES: Status: ACTIVE | Noted: 2024-09-16

## 2024-09-16 PROBLEM — T78.40XA ALLERGIES: Chronic | Status: ACTIVE | Noted: 2024-09-16

## 2024-09-16 PROCEDURE — 3078F DIAST BP <80 MM HG: CPT | Performed by: INTERNAL MEDICINE

## 2024-09-16 PROCEDURE — 3074F SYST BP LT 130 MM HG: CPT | Performed by: INTERNAL MEDICINE

## 2024-09-16 PROCEDURE — 99215 OFFICE O/P EST HI 40 MIN: CPT | Performed by: INTERNAL MEDICINE

## 2024-09-16 RX ORDER — CLONAZEPAM 0.5 MG/1
0.5 TABLET ORAL
COMMUNITY
Start: 2024-01-01

## 2024-09-16 ASSESSMENT — FIBROSIS 4 INDEX: FIB4 SCORE: 1.73

## 2024-09-17 NOTE — ASSESSMENT & PLAN NOTE
This is a chronic condition.  Patient followed by rheumatology service.  The patient currently being treated with Remicade.  Patient tolerating medication well.  No significant side effects reported.

## 2024-09-17 NOTE — ASSESSMENT & PLAN NOTE
Chronic condition.  The patient currently taking pravastatin.  Recent lipid panel result discussed with the patient.

## 2024-09-17 NOTE — ASSESSMENT & PLAN NOTE
Chronic condition.  Currently taking glimepiride and metformin.  Recent A1c 7%.  The patient denies significant hypoglycemia.

## 2024-09-17 NOTE — ASSESSMENT & PLAN NOTE
Chronic condition.  Patient presently taking Remicade.  Patient denies fever chills night sweats unexplained weight loss.  Tolerating medication well.

## 2024-09-17 NOTE — PROGRESS NOTES
PRIMARY CARE CLINIC VISIT        Chief Complaint   Patient presents with    Lab Results     Follow-up diabetes  Lab test result  REM sleep behavior disorder  Proteinuria  Immunosuppressed status  Psoriatic arthritis  Allergies  Hyperlipidemia  Acid reflux  Migraine  Anemia  Medication review      History of Present Illness     Psoriatic arthritis (HCC)  This is a chronic condition.  Patient followed by rheumatology service.  The patient currently being treated with Remicade.  Patient tolerating medication well.  No significant side effects reported.    Type 2 diabetes mellitus with hyperlipidemia (HCC)  Chronic condition.  Currently taking glimepiride and metformin.  Recent A1c 7%.  The patient denies significant hypoglycemia.    Immunosuppressed status (HCC)  Chronic condition.  Patient presently taking Remicade.  Patient denies fever chills night sweats unexplained weight loss.  Tolerating medication well.    Proteinuria  This is a chronic condition.  Lab test result discussed with the patient.    Patient asymptomatic.  Recommend patient to repeat the urine protein to creatinine ratio in 6 months.    REM sleep behavior disorder  Chronic condition.  Patient followed by sleep specialist.  Patient presently taking Klonopin 0.5 Mg at bedtime as needed.  Patient tolerating medication well.  No significant side effects reported.    Dyslipidemia due to type 2 diabetes mellitus (HCC)  Chronic condition.  The patient currently taking pravastatin.  Recent lipid panel result discussed with the patient.    Gastroesophageal reflux disease without esophagitis  Chronic condition.  The patient presently taking omeprazole.  The patient denies nausea vomiting or dysphagia.    Allergies  Chronic condition.  The patient currently using Atrovent solution as needed.  He also takes Claritin 10 mg daily as needed over-the-counter.  The patient denies fever chills shortness of breath or wheezing    Migraine  Chronic condition.  The  patient followed by neurology service.  The patient take Maxalt as needed.  Tolerating medication well.  The patient currently asymptomatic    Anemia  Chronic condition.  Lab test showed patient with slightly low hemoglobin since the last few years.  The patient denies any history of bleeding.  Patient presently taking Remicade for psoriatic arthritis.  Recommend lab test to check for iron study B12 folate  And Hemoglobin electrophoresis  Current Outpatient Medications on File Prior to Visit   Medication Sig Dispense Refill    meloxicam (MOBIC) 15 MG tablet Take one tablet by mouth with food daily. No advil/aleve/motrin/ibuprofen on same day. 30 Tablet 2    donepezil (ARICEPT) 5 MG Tab 10 mg.      ipratropium (ATROVENT) 0.03 % Solution       rizatriptan (MAXALT-MLT) 10 MG disintegrating tablet Indications: Migraine Headache      pravastatin (PRAVACHOL) 20 MG Tab TAKE ONE TABLET BY MOUTH EVERY EVENING 90 Tablet 0    omeprazole (PRILOSEC) 20 MG delayed-release capsule TAKE ONE CAPSULE BY MOUTH ONCE DAILY 90 Capsule 0    glimepiride (AMARYL) 2 MG Tab TAKE ONE TABLET BY MOUTH TWO TIMES A DAY. 180 Tablet 0    methotrexate 2.5 MG tablet Take 6 Tablets by mouth every 7 days. 78 Tablet 3    sildenafil citrate (VIAGRA) 25 MG Tab Take 25 mg by mouth 1 time a day as needed for Erectile Dysfunction.      metformin (GLUCOPHAGE) 1000 MG tablet Take 1 Tablet by mouth 2 times a day with meals. 180 Tablet 3    Loratadine (KLS ALLERCLEAR PO)       inFLIXimab (REMICADE) 100 MG Recon Soln Infuse 600 mg into a venous catheter every 8 weeks.      Melatonin 10 MG Cap Take 20 mg by mouth every evening.      clonazePAM (KLONOPIN) 0.5 MG Tab 0.5 mg.      folic acid (FOLVITE) 1 MG Tab Take 1 Tablet by mouth every day. Except the day of methotrexate. 90 Tablet 3    Acetaminophen (TYLENOL ARTHRITIS PAIN PO) Take 1,250 mg by mouth every day.       No current facility-administered medications on file prior to visit.        Allergies: Patient has  no known allergies.    Current Outpatient Medications Ordered in Epic   Medication Sig Dispense Refill    meloxicam (MOBIC) 15 MG tablet Take one tablet by mouth with food daily. No advil/aleve/motrin/ibuprofen on same day. 30 Tablet 2    donepezil (ARICEPT) 5 MG Tab 10 mg.      ipratropium (ATROVENT) 0.03 % Solution       rizatriptan (MAXALT-MLT) 10 MG disintegrating tablet Indications: Migraine Headache      pravastatin (PRAVACHOL) 20 MG Tab TAKE ONE TABLET BY MOUTH EVERY EVENING 90 Tablet 0    omeprazole (PRILOSEC) 20 MG delayed-release capsule TAKE ONE CAPSULE BY MOUTH ONCE DAILY 90 Capsule 0    glimepiride (AMARYL) 2 MG Tab TAKE ONE TABLET BY MOUTH TWO TIMES A DAY. 180 Tablet 0    methotrexate 2.5 MG tablet Take 6 Tablets by mouth every 7 days. 78 Tablet 3    sildenafil citrate (VIAGRA) 25 MG Tab Take 25 mg by mouth 1 time a day as needed for Erectile Dysfunction.      metformin (GLUCOPHAGE) 1000 MG tablet Take 1 Tablet by mouth 2 times a day with meals. 180 Tablet 3    Loratadine (KLS ALLERCLEAR PO)       inFLIXimab (REMICADE) 100 MG Recon Soln Infuse 600 mg into a venous catheter every 8 weeks.      Melatonin 10 MG Cap Take 20 mg by mouth every evening.      clonazePAM (KLONOPIN) 0.5 MG Tab 0.5 mg.      folic acid (FOLVITE) 1 MG Tab Take 1 Tablet by mouth every day. Except the day of methotrexate. 90 Tablet 3    Acetaminophen (TYLENOL ARTHRITIS PAIN PO) Take 1,250 mg by mouth every day.       No current [facility]-ordered facility-administered medications on file.       Past Medical History:   Diagnosis Date    Back pain     Back pain     Chickenpox     Diabetes (HCC)     Double vision     Frequent urination     Heartburn     Hyperlipidemia     Influenza     Mumps     Nasal drainage     Painful joint     Psoriatic arthritis (HCC)     Rash     REM sleep behavior disorder     Rheumatoid arthritis (HCC)     Sleep apnea     Sore muscles     Tonsillitis     Wears glasses        Past Surgical History:   Procedure  "Laterality Date    CATARACT EXTRACTION WITH IOL Bilateral     [place] REMV 2ND CATARACT,CORN-SCLER SECTN         Family History   Problem Relation Age of Onset    Cancer Mother         lung and brain -  at 88    Heart Disease Father         passed at 62    Hypertension Sister     Hypertension Brother     Cancer Brother        Social History     Tobacco Use   Smoking Status Former   Smokeless Tobacco Never   Tobacco Comments    quit in        Social History     Substance and Sexual Activity   Alcohol Use Yes    Alcohol/week: 1.2 oz    Types: 2 Glasses of wine per week    Comment: occasional glass of wine       Review of systems  As per HPI above. All other systems reviewed and negative.      Past Medical, Social, and Family history reviewed and [facility] in [facility]       LAB DATA:     I have independently reviewed / interpreted labs    Lab Results   Component Value Date/Time    HBA1C 7.0 (H) 08/10/2024 08:27 AM    HBA1C 6.8 (H) 2023 09:58 AM    HBA1C 8.0 (H) 2023 09:14 AM        Lab Results   Component Value Date/Time    SODIUM 138 08/10/2024 08:27 AM    POTASSIUM 4.8 08/10/2024 08:27 AM    GLUCOSE 195 (H) 08/10/2024 08:27 AM    BUN 22 08/10/2024 08:27 AM    CREATININE 1.20 08/10/2024 08:27 AM       Lab Results   Component Value Date/Time    CHOLSTRLTOT 152 08/10/2024 08:27 AM    TRIGLYCERIDE 149 08/10/2024 08:27 AM    HDL 44 08/10/2024 08:27 AM    LDL 78 08/10/2024 08:27 AM       Lab Results   Component Value Date/Time    ALTSGPT 21 08/10/2024 08:27 AM          Objective     /68   Pulse 82   Temp 36.7 °C (98 °F) (Temporal)   Resp 16   Ht 1.68 m (5' 6.14\")   Wt 63 kg (139 lb)   SpO2 97%    Body mass index is 22.34 kg/m².    General: alert in no apparent distress.  Cardiovascular: regular rate and rhythm  Pulmonary: lungs : no wheezing   Gastrointestinal: BS present.       Assessment and Plan     1. Type 2 diabetes mellitus with hyperlipidemia (HCC)  Chronic stable condition.  A1c " 7%.  Recommend to continue metformin 1000 Mg twice daily and glimepiride 2 Mg twice daily.  Discussed with the patient goals for Diabetes management:  -HbA1C < 7% /  Fasting BG   /  2 hrs postprandial  - 180    Pt's education:   -Discussed with potential microvascular/macrovascular complications of diabetes  -The importance of increasing physical activity to improve diabetes control  discussed with the patient.   -Patient was also educated on changing diet and making better choices to help control blood sugar.          - HEMOGLOBIN A1C; Future  - Basic Metabolic Panel; Future    2. REM sleep behavior disorder  Chronic stable condition.  Patient to follow-up with sleep specialist as directed.  He will continue to take Klonopin 0.5 Mg at bedtime as needed    3. Proteinuria  Chronic stable condition.  Recommend low protein diet.  Avoid NSAID.  Repeat urine test in 6 months for follow-up    4. Psoriatic arthritis (HCC)  5. Immunosuppressed status (HCC)  Chronic stable condition.  Continue Remicade treatment 600 mg every 8 weeks.  Continue follow-up with rheumatology service    6. Allergy, subsequent encounter  Chronic stable.  Continue Claritin 10 mg daily..  Atrovent nasal spray as needed.  Currently the patient asymptomatic.  Continue to monitor    7. Dyslipidemia due to type 2 diabetes mellitus (HCC)  - ALANINE AMINO-TRANS; Future  - Lipid Profile; Future  Chronic stable panel result discussed with the patient.  Continue pravastatin 20 Mg daily    8. Gastroesophageal reflux disease without esophagitis  Chronic stable.  Continue omeprazole 20 Mg daily    9. Migraine without aura and without status migrainosus, not intractable  Chronic stable.  Continue Maxalt 10 mg daily as needed for migraine flareup.  Currently the patient asymptomatic.  Continue follow-up with neurology service as directed.    10. Need for hepatitis C screening test  - HCV Scrn ( [id number] 1xLife - Medicare Patients Only);  Future    11. Chronic fatigue  - CBC WITHOUT DIFFERENTIAL; Future    12. Anemia, unspecified type  Chronic stable condition.  The patient denies history of bleeding.  Lab test result discussed with the patient.  Recommend:  - FERRITIN; Future  - OCCULT BLOOD STOOL; Future  - FOLATE; Future  - IRON; Future  - VITAMIN B12; Future  -Hg electrophoresis    Time spent: I spent:  47   minutes -    That includes time for chart review before the visit, the actual patient visit, and time spent on documentation in the EMR after the visit.  Chart review/prep, review of other providers' records, Independent review of imagings/labs , face-to-face time for history/examination, pt's counseling/education, ordering, prescribing, treatment plan discussed with patient, and care coordination.           Please note that this dictation was created using voice recognition software. I have made every reasonable attempt to correct obvious errors, but I expect that there are errors of grammar and possibly content that I did not discover before finalizing the note.    Raul Low MD  Internal Medicine  [facility]

## 2024-09-17 NOTE — ASSESSMENT & PLAN NOTE
Chronic condition.  The patient followed by neurology service.  The patient take Maxalt as needed.  Tolerating medication well.  The patient currently asymptomatic

## 2024-09-17 NOTE — ASSESSMENT & PLAN NOTE
This is a chronic condition.  Lab test result discussed with the patient.    Patient asymptomatic.  Recommend patient to repeat the urine protein to creatinine ratio in 6 months.

## 2024-09-17 NOTE — ASSESSMENT & PLAN NOTE
Chronic condition.  The patient currently using Atrovent solution as needed.  He also takes Claritin 10 mg daily as needed over-the-counter.  The patient denies fever chills shortness of breath or wheezing

## 2024-09-17 NOTE — ASSESSMENT & PLAN NOTE
Chronic condition.  The patient presently taking omeprazole.  The patient denies nausea vomiting or dysphagia.

## 2024-09-17 NOTE — ASSESSMENT & PLAN NOTE
Chronic condition.  Lab test showed patient with slightly low hemoglobin since the last few years.  The patient denies any history of bleeding.  Patient presently taking Remicade for psoriatic arthritis.  Recommend lab test to check for iron study B12 folate

## 2024-09-17 NOTE — ASSESSMENT & PLAN NOTE
Chronic condition.  Patient followed by sleep specialist.  Patient presently taking Klonopin 0.5 Mg at bedtime as needed.  Patient tolerating medication well.  No significant side effects reported.

## 2024-09-28 DIAGNOSIS — E78.2 MIXED HYPERLIPIDEMIA: ICD-10-CM

## 2024-09-28 DIAGNOSIS — E11.69 TYPE 2 DIABETES MELLITUS WITH HYPERLIPIDEMIA (HCC): ICD-10-CM

## 2024-09-28 DIAGNOSIS — E78.5 TYPE 2 DIABETES MELLITUS WITH HYPERLIPIDEMIA (HCC): ICD-10-CM

## 2024-09-30 ENCOUNTER — TELEPHONE (OUTPATIENT)
Dept: SLEEP MEDICINE | Facility: MEDICAL CENTER | Age: 79
End: 2024-09-30
Payer: MEDICARE

## 2024-09-30 DIAGNOSIS — D84.9 IMMUNOSUPPRESSED STATUS (HCC): ICD-10-CM

## 2024-09-30 DIAGNOSIS — G47.52 REM SLEEP BEHAVIOR DISORDER: ICD-10-CM

## 2024-09-30 RX ORDER — GLIMEPIRIDE 2 MG/1
2 TABLET ORAL 2 TIMES DAILY
Qty: 180 TABLET | Refills: 0 | Status: SHIPPED | OUTPATIENT
Start: 2024-09-30

## 2024-09-30 RX ORDER — PRAVASTATIN SODIUM 20 MG
20 TABLET ORAL EVERY EVENING
Qty: 90 TABLET | Refills: 0 | Status: SHIPPED | OUTPATIENT
Start: 2024-09-30

## 2024-09-30 NOTE — TELEPHONE ENCOUNTER
Received request via: Pharmacy    Was the patient seen in the last year in this department? Yes    Does the patient have an active prescription (recently filled or refills available) for medication(s) requested? No    Pharmacy Name: [facility]     Does the patient have half-way Plus and need 100-day supply? (This applies to ALL medications) Patient does not have SCP

## 2024-10-02 RX ORDER — CLONAZEPAM 0.5 MG/1
.5-1.5 TABLET ORAL NIGHTLY
Qty: 90 TABLET | Refills: 2 | Status: SHIPPED | OUTPATIENT
Start: 2024-10-02 | End: 2024-11-01

## 2024-10-03 RX ORDER — FOLIC ACID 1 MG/1
1 TABLET ORAL DAILY
Qty: 90 TABLET | Refills: 3 | Status: SHIPPED | OUTPATIENT
Start: 2024-10-03

## 2024-10-16 ENCOUNTER — APPOINTMENT (OUTPATIENT)
Dept: RHEUMATOLOGY | Facility: MEDICAL CENTER | Age: 79
End: 2024-10-16
Payer: MEDICARE

## 2024-10-28 ENCOUNTER — OUTPATIENT INFUSION SERVICES (OUTPATIENT)
Dept: ONCOLOGY | Facility: MEDICAL CENTER | Age: 79
End: 2024-10-28
Attending: STUDENT IN AN ORGANIZED HEALTH CARE EDUCATION/TRAINING PROGRAM
Payer: MEDICARE

## 2024-10-28 VITALS
WEIGHT: 139.99 LBS | OXYGEN SATURATION: 98 % | HEIGHT: 66 IN | RESPIRATION RATE: 18 BRPM | SYSTOLIC BLOOD PRESSURE: 117 MMHG | HEART RATE: 77 BPM | DIASTOLIC BLOOD PRESSURE: 56 MMHG | TEMPERATURE: 98 F | BODY MASS INDEX: 22.5 KG/M2

## 2024-10-28 DIAGNOSIS — L40.50 PSORIATIC ARTHRITIS (HCC): ICD-10-CM

## 2024-10-28 DIAGNOSIS — L40.0 PLAQUE PSORIASIS: ICD-10-CM

## 2024-10-28 LAB
BASOPHILS # BLD AUTO: 0.3 % (ref 0–1.8)
BASOPHILS # BLD: 0.02 K/UL (ref 0–0.12)
EOSINOPHIL # BLD AUTO: 0.13 K/UL (ref 0–0.51)
EOSINOPHIL NFR BLD: 1.8 % (ref 0–6.9)
ERYTHROCYTE [DISTWIDTH] IN BLOOD BY AUTOMATED COUNT: 46.2 FL (ref 35.9–50)
HCT VFR BLD AUTO: 36.4 % (ref 42–52)
HGB BLD-MCNC: 12.6 G/DL (ref 14–18)
IMM GRANULOCYTES # BLD AUTO: 0.02 K/UL (ref 0–0.11)
IMM GRANULOCYTES NFR BLD AUTO: 0.3 % (ref 0–0.9)
LYMPHOCYTES # BLD AUTO: 2.91 K/UL (ref 1–4.8)
LYMPHOCYTES NFR BLD: 39.8 % (ref 22–41)
MCH RBC QN AUTO: 31.7 PG (ref 27–33)
MCHC RBC AUTO-ENTMCNC: 34.6 G/DL (ref 32.3–36.5)
MCV RBC AUTO: 91.7 FL (ref 81.4–97.8)
MONOCYTES # BLD AUTO: 0.76 K/UL (ref 0–0.85)
MONOCYTES NFR BLD AUTO: 10.4 % (ref 0–13.4)
NEUTROPHILS # BLD AUTO: 3.48 K/UL (ref 1.82–7.42)
NEUTROPHILS NFR BLD: 47.4 % (ref 44–72)
NRBC # BLD AUTO: 0 K/UL
NRBC BLD-RTO: 0 /100 WBC (ref 0–0.2)
OUTPT INFUS CBC COMMENT OICOM: ABNORMAL
PLATELET # BLD AUTO: 180 K/UL (ref 164–446)
PMV BLD AUTO: 9.4 FL (ref 9–12.9)
RBC # BLD AUTO: 3.97 M/UL (ref 4.7–6.1)
WBC # BLD AUTO: 7.3 K/UL (ref 4.8–10.8)

## 2024-10-28 PROCEDURE — 700111 HCHG RX REV CODE 636 W/ 250 OVERRIDE (IP): Mod: JZ,JG | Performed by: STUDENT IN AN ORGANIZED HEALTH CARE EDUCATION/TRAINING PROGRAM

## 2024-10-28 PROCEDURE — 85025 COMPLETE CBC W/AUTO DIFF WBC: CPT

## 2024-10-28 PROCEDURE — 96413 CHEMO IV INFUSION 1 HR: CPT

## 2024-10-28 PROCEDURE — 700105 HCHG RX REV CODE 258: Performed by: STUDENT IN AN ORGANIZED HEALTH CARE EDUCATION/TRAINING PROGRAM

## 2024-10-28 RX ORDER — METHYLPREDNISOLONE SODIUM SUCCINATE 125 MG/2ML
125 INJECTION, POWDER, LYOPHILIZED, FOR SOLUTION INTRAMUSCULAR; INTRAVENOUS PRN
OUTPATIENT
Start: 2024-10-29

## 2024-10-28 RX ORDER — 0.9 % SODIUM CHLORIDE 0.9 %
10 VIAL (ML) INJECTION PRN
OUTPATIENT
Start: 2024-10-29

## 2024-10-28 RX ORDER — SODIUM CHLORIDE 9 MG/ML
INJECTION, SOLUTION INTRAVENOUS CONTINUOUS
OUTPATIENT
Start: 2024-10-29

## 2024-10-28 RX ORDER — DIPHENHYDRAMINE HYDROCHLORIDE 50 MG/ML
25 INJECTION INTRAMUSCULAR; INTRAVENOUS ONCE
Status: DISCONTINUED | OUTPATIENT
Start: 2024-10-28 | End: 2024-10-28 | Stop reason: HOSPADM

## 2024-10-28 RX ORDER — 0.9 % SODIUM CHLORIDE 0.9 %
3 VIAL (ML) INJECTION PRN
OUTPATIENT
Start: 2024-10-29

## 2024-10-28 RX ORDER — EPINEPHRINE 1 MG/ML(1)
0.5 AMPUL (ML) INJECTION PRN
OUTPATIENT
Start: 2024-10-29

## 2024-10-28 RX ORDER — ACETAMINOPHEN 325 MG/1
650 TABLET ORAL ONCE
OUTPATIENT
Start: 2024-10-29

## 2024-10-28 RX ORDER — 0.9 % SODIUM CHLORIDE 0.9 %
VIAL (ML) INJECTION PRN
OUTPATIENT
Start: 2024-10-29

## 2024-10-28 RX ORDER — ACETAMINOPHEN 325 MG/1
650 TABLET ORAL ONCE
Status: DISCONTINUED | OUTPATIENT
Start: 2024-10-28 | End: 2024-10-28 | Stop reason: HOSPADM

## 2024-10-28 RX ORDER — DIPHENHYDRAMINE HYDROCHLORIDE 50 MG/ML
50 INJECTION INTRAMUSCULAR; INTRAVENOUS PRN
OUTPATIENT
Start: 2024-10-29

## 2024-10-28 RX ADMIN — INFLIXIMAB-AXXQ 300 MG: 100 INJECTION, POWDER, LYOPHILIZED, FOR SOLUTION INTRAVENOUS at 16:00

## 2024-10-28 ASSESSMENT — FIBROSIS 4 INDEX: FIB4 SCORE: 1.73

## 2024-11-11 DIAGNOSIS — E11.9 TYPE 2 DIABETES MELLITUS WITHOUT COMPLICATION, WITHOUT LONG-TERM CURRENT USE OF INSULIN (HCC): Chronic | ICD-10-CM

## 2024-11-11 NOTE — TELEPHONE ENCOUNTER
Received request via: Pharmacy    Was the patient seen in the last year in this department? Yes    Does the patient have an active prescription (recently filled or refills available) for medication(s) requested? No        Does the patient have nursing home Plus and need 100-day supply? (This applies to ALL medications) Patient does not have SCP

## 2024-11-12 ASSESSMENT — RHEUMATOLOGY FOLLOW-UP QUESTIONNAIRE
MARK ALL THE AREAS OF PAIN: 109873011
DURATION: 30-60 MINS
CHIEF_COMPLAINT: CHECK UP
CHARACTERISTIC: BETTER WITH ACTIVITY
JOINT PAIN: BETTER WITH ACTIVITY

## 2024-11-13 ENCOUNTER — OFFICE VISIT (OUTPATIENT)
Dept: RHEUMATOLOGY | Facility: MEDICAL CENTER | Age: 79
End: 2024-11-13
Attending: STUDENT IN AN ORGANIZED HEALTH CARE EDUCATION/TRAINING PROGRAM
Payer: MEDICARE

## 2024-11-13 ENCOUNTER — OFFICE VISIT (OUTPATIENT)
Dept: SLEEP MEDICINE | Facility: MEDICAL CENTER | Age: 79
End: 2024-11-13
Attending: STUDENT IN AN ORGANIZED HEALTH CARE EDUCATION/TRAINING PROGRAM
Payer: MEDICARE

## 2024-11-13 VITALS
HEIGHT: 67 IN | DIASTOLIC BLOOD PRESSURE: 66 MMHG | SYSTOLIC BLOOD PRESSURE: 124 MMHG | BODY MASS INDEX: 21.19 KG/M2 | WEIGHT: 135 LBS | HEART RATE: 76 BPM | RESPIRATION RATE: 16 BRPM | OXYGEN SATURATION: 96 %

## 2024-11-13 VITALS
HEART RATE: 80 BPM | BODY MASS INDEX: 21.58 KG/M2 | OXYGEN SATURATION: 98 % | SYSTOLIC BLOOD PRESSURE: 124 MMHG | TEMPERATURE: 97.6 F | DIASTOLIC BLOOD PRESSURE: 66 MMHG | WEIGHT: 137.5 LBS | HEIGHT: 67 IN

## 2024-11-13 DIAGNOSIS — G47.52 REM SLEEP BEHAVIOR DISORDER: ICD-10-CM

## 2024-11-13 DIAGNOSIS — M47.817 DJD (DEGENERATIVE JOINT DISEASE), LUMBOSACRAL: ICD-10-CM

## 2024-11-13 DIAGNOSIS — M19.011 PRIMARY OSTEOARTHRITIS OF BOTH SHOULDERS: ICD-10-CM

## 2024-11-13 DIAGNOSIS — L40.50 PSORIATIC ARTHRITIS (HCC): Chronic | ICD-10-CM

## 2024-11-13 DIAGNOSIS — M19.012 PRIMARY OSTEOARTHRITIS OF BOTH SHOULDERS: ICD-10-CM

## 2024-11-13 DIAGNOSIS — L40.0 PLAQUE PSORIASIS: ICD-10-CM

## 2024-11-13 DIAGNOSIS — D84.9 IMMUNOSUPPRESSED STATUS (HCC): ICD-10-CM

## 2024-11-13 PROCEDURE — 3078F DIAST BP <80 MM HG: CPT | Performed by: STUDENT IN AN ORGANIZED HEALTH CARE EDUCATION/TRAINING PROGRAM

## 2024-11-13 PROCEDURE — 3074F SYST BP LT 130 MM HG: CPT | Performed by: STUDENT IN AN ORGANIZED HEALTH CARE EDUCATION/TRAINING PROGRAM

## 2024-11-13 PROCEDURE — 99213 OFFICE O/P EST LOW 20 MIN: CPT | Mod: 27 | Performed by: STUDENT IN AN ORGANIZED HEALTH CARE EDUCATION/TRAINING PROGRAM

## 2024-11-13 PROCEDURE — 99213 OFFICE O/P EST LOW 20 MIN: CPT | Performed by: STUDENT IN AN ORGANIZED HEALTH CARE EDUCATION/TRAINING PROGRAM

## 2024-11-13 PROCEDURE — 99214 OFFICE O/P EST MOD 30 MIN: CPT | Performed by: STUDENT IN AN ORGANIZED HEALTH CARE EDUCATION/TRAINING PROGRAM

## 2024-11-13 PROCEDURE — 99212 OFFICE O/P EST SF 10 MIN: CPT | Performed by: STUDENT IN AN ORGANIZED HEALTH CARE EDUCATION/TRAINING PROGRAM

## 2024-11-13 RX ORDER — CLONAZEPAM 0.5 MG/1
0.5 TABLET ORAL
COMMUNITY

## 2024-11-13 ASSESSMENT — FIBROSIS 4 INDEX
FIB4 SCORE: 1.75
FIB4 SCORE: 1.75

## 2024-11-13 NOTE — PROGRESS NOTES
[facility] Follow-up Visit    CC: Follow-up regarding management of REM behavior disorder      HPI:  Phuc Mcdowell is a 78 y.o.male  with psoriatic arthritis, type 2 diabetes mellitus, psoriasis, hyperlipidemia, and REM behavior disorder.  Presents today to follow-up regarding management of her behavior disorder.    He states he still has movements at night per his wife.  However he does not recall any specific dreams related to his actions.  He finds that his sleep is worse if he does not take his medication at night.  He is currently taking 10 mg of melatonin and between 1 mg and 1.5 mg of Klonopin at night.  He does find that the Klonopin is helpful to his sleep.  If there is a night where he forgets to take it will often wake up in the middle of the night and take it.  He continues to ambulate better precautions.  Has no other acute complaints at this time.    Sleep History  Last sleep study was done in September 2017 in [place] which showed REM without atonia.  Study at that time did not show obstructive sleep apnea with an overall AHI of 1.1.  Patient believes he did use CPAP up until the sleep study.    Patient Active Problem List    Diagnosis Date Noted    DJD (degenerative joint disease), lumbosacral 11/13/2024    Allergies 09/16/2024    Migraine 09/16/2024    Anemia 09/16/2024    Proteinuria 08/10/2024    Acute left-sided low back pain without sciatica 02/01/2024    Need for vaccination 09/05/2023    Dyspnea on exertion 09/05/2023    Gastroesophageal reflux disease without esophagitis 05/02/2023    Primary osteoarthritis of both shoulders 04/25/2023    Chronic pain of left elbow 07/26/2022    Plaque psoriasis 04/19/2022    Immunosuppressed status (HCC) 04/17/2022    Dyslipidemia due to type 2 diabetes mellitus (HCC) 04/12/2022    REM sleep behavior disorder 04/11/2022    Psoriatic arthritis (HCC) 04/11/2022    Type 2 diabetes mellitus with hyperlipidemia (HCC) 04/11/2022       Past Medical  History:   Diagnosis Date    Back pain     Back pain     Chickenpox     Diabetes (HCC)     Double vision     Frequent urination     Heartburn     Hyperlipidemia     Influenza     Mumps     Nasal drainage     Painful joint     Psoriatic arthritis (HCC)     Rash     REM sleep behavior disorder     Rheumatoid arthritis (HCC)     Sleep apnea     Sore muscles     Tonsillitis     Wears glasses         Past Surgical History:   Procedure Laterality Date    CATARACT EXTRACTION WITH IOL Bilateral 2016    [place] REMV 2ND CATARACT,CORN-SCLER SECTN         Family History   Problem Relation Age of Onset    Cancer Mother         lung and brain -  at 88    Heart Disease Father         passed at 62    Hypertension Sister     Hypertension Brother     Cancer Brother        Social History     Socioeconomic History    Marital status:      Spouse name: Not on file    Number of children: Not on file    Years of education: Not on file    Highest education level: Bachelor's degree (e.g., BA, AB, BS)   Occupational History    Not on file   Tobacco Use    Smoking status: Former    Smokeless tobacco: Never    Tobacco comments:     quit in    Vaping Use    Vaping status: Never Used   Substance and Sexual Activity    Alcohol use: Yes     Alcohol/week: 1.2 oz     Types: 2 Glasses of wine per week     Comment: occasional glass of wine    Drug use: Never    Sexual activity: Yes     Partners: Female     Birth control/protection: None   Other Topics Concern    Not on file   Social History Narrative    Not on file     Social Drivers of Health     Financial Resource Strain: Low Risk  (2022)    Overall Financial Resource Strain (CARDIA)     Difficulty of Paying Living Expenses: Not very hard   Food Insecurity: No Food Insecurity (2022)    Hunger Vital Sign     Worried About Running Out of Food in the Last Year: Never true     Ran Out of Food in the Last Year: Never true   Transportation Needs: No Transportation Needs (2022)     PRAPARE - Transportation     Lack of Transportation (Medical): No     Lack of Transportation (Non-Medical): No   Physical Activity: Sufficiently Active (11/1/2022)    Exercise Vital Sign     Days of Exercise per Week: 3 days     Minutes of Exercise per Session: 60 min   Stress: No Stress Concern Present (11/1/2022)    [employer] [place] - Occupational Stress Questionnaire     Feeling of Stress : Not at all   Social Connections: Not on file   Intimate Partner Violence: Not on file   Housing Stability: Low Risk  (11/1/2022)    Housing Stability Vital Sign     Unable to Pay for Housing in the Last Year: No     Number of Places Lived in the Last Year: 2     Unstable Housing in the Last Year: No       Current Outpatient Medications   Medication Sig Dispense Refill    Aspirin 81 MG Cap 81 mg.      clonazePAM (KLONOPIN) 0.5 MG Tab Take 0.5 mg by mouth.      metformin (GLUCOPHAGE) 1000 MG tablet Take 1 Tablet by mouth 2 times a day with meals. 180 Tablet 3    meloxicam (MOBIC) 15 MG tablet Take one tablet by mouth with food daily for 90 days. No advil/aleve/motrin/ibuprofen on same day. 90 Tablet 3    folic acid (FOLVITE) 1 MG Tab Take 1 Tablet by mouth every day. Except the day of methotrexate. 90 Tablet 3    glimepiride (AMARYL) 2 MG Tab TAKE ONE TABLET BY MOUTH TWO TIMES A DAY. 180 Tablet 0    pravastatin (PRAVACHOL) 20 MG Tab TAKE ONE TABLET BY MOUTH EVERY EVENING 90 Tablet 0    omeprazole (PRILOSEC) 20 MG delayed-release capsule TAKE ONE CAPSULE BY MOUTH ONCE DAILY 90 Capsule 0    meloxicam (MOBIC) 15 MG tablet Take one tablet by mouth with food daily. No advil/aleve/motrin/ibuprofen on same day. 30 Tablet 2    donepezil (ARICEPT) 5 MG Tab 10 mg.      ipratropium (ATROVENT) 0.03 % Solution       rizatriptan (MAXALT-MLT) 10 MG disintegrating tablet Indications: Migraine Headache      methotrexate 2.5 MG tablet Take 6 Tablets by mouth every 7 days. 78 Tablet 3    sildenafil citrate (VIAGRA) 25 MG  "Tab Take 25 mg by mouth 1 time a day as needed for Erectile Dysfunction.      Loratadine (KLS ALLERCLEAR PO)       inFLIXimab (REMICADE) 100 MG Recon Soln Infuse 600 mg into a venous catheter every 8 weeks.      Melatonin 10 MG Cap Take 20 mg by mouth every evening.      Acetaminophen (TYLENOL ARTHRITIS PAIN PO) Take 1,250 mg by mouth every day.       No current facility-administered medications for this visit.        ALLERGIES: Patient has no known allergies.    ROS  Constitutional: Denies fevers, Denies weight changes  Ears/Nose/Throat/Mouth: Denies nasal congestion or sore throat   Cardiovascular: Denies chest pain  Respiratory: Denies shortness of breath, Denies cough  Gastrointestinal/Hepatic: Denies nausea, vomiting  Sleep: see HPI      PHYSICAL EXAM  /66 (BP Location: Left arm, Patient Position: Sitting, BP Cuff Size: Adult)   Pulse 76   Resp 16   Ht 1.702 m (5' 7\")   Wt 61.2 kg (135 lb)   SpO2 96%   BMI 21.14 kg/m²   Appearance: Well-nourished, well-developed, no acute distress  Eyes:  No scleral icterus , EOMI  Musculoskeletal:  Grossly normal; gait and station normal; digits and nails normal  Skin:  No rashes, petechiae, cyanosis  Neurologic: without focal signs; oriented to person, time, place, and purpose; judgement intact      Medical Decision Making   Assessment and Plan  Phuc Mcdowell is a 78 y.o.male  with psoriatic arthritis, type 2 diabetes mellitus, psoriasis, hyperlipidemia, and REM behavior disorder.  Presents today to follow-up regarding management of her behavior disorder.    The medical record was reviewed.    REM behavior disorder   Patient continues to use and benefit from melatonin 10 mg and Klonopin 1 mg to 1.5 mg.  Continues to implement bedroom precautions.  Overall has no acute complaints at this time.    Plan  -Continue Klonopin 1.0 to 1.5 mg nightly  -Continue melatonin 10 mg nightly  -Continue to implement bedroom precautions  -Advised to reach out MyChart any " concerns or questions    Return in about 9 months (around 8/13/2025).      Please note portions of this record was created using voice recognition software. I have made every reasonable attempt to correct obvious errors, but I expect that there are errors of grammar and possibly content I did not discover before finalizing the note.

## 2024-11-13 NOTE — PATIENT INSTRUCTIONS
[facility]    Below are important guidelines to help you navigate your health care needs and assist us in caring for you safely and effectively. We encourage you to carefully read and understand this information and adhere to them accordingly.    [employer] Messaging and Phone Calls:  Diagnosis and Treatment - For a detailed explanation of your condition and treatment plan from today's visit, refer to the visit note on [employer] via the following steps:  Log in to [employer] and click on “Visits” at the top.  Scroll down to “Past Visits” under Appointments.  Click on “View Notes” under the appropriate visit date.  Questions or Concerns - MyChart messaging is for non-urgent matters that do not require immediate attention and should be brief with no more than two questions or concerns. If you have multiple questions or concerns, we ask that you schedule an appointment to have them properly addressed.  Response to Messages - [employer] messages are addressed throughout the week depending on clinical availability, so we ask that you allow up to one week for a response.  Phone Calls and Voicemails - Phone calls and voicemail messages are reserved for time-sensitive matters that cannot wait to be addressed via [employer]. We ask that you refrain from calling the office multiple times or leaving multiple voicemails regarding the same issue as doing so may lead to delays in response time.  Urgent Issues - For urgent medical matters or medical emergencies that cannot wait, you are advised to go to your nearest Urgent Care or Emergency Department for immediate attention.    Laboratory Tests and Imaging Studies:  Future Lab and Imaging Orders - We ask that you get your lab tests and imaging studies done no later than one week before your follow-up visit unless instructed otherwise.  Results Communication - You may see some test results marked as “abnormal” that are not necessarily significant or concerning. If  there are significant abnormalities on your test results that warrant further action, you will be notified via MyChart or phone call, otherwise they will be addressed at your follow-up visit.    Prescriptions and Refill Requests:  General Prescriptions (e.g. prednisone, hydroxychloroquine, leflunomide, methotrexate, etc.) - These are sent to Retail Pharmacies, so all refill requests of these medications should be directed to your local pharmacy.  Specialty Prescriptions (e.g. Enbrel, Humira, Cosentyx, Xeljanz, etc.) - These are sent to Specialty Pharmacies, so all refill requests of these medications should be directed to your designated specialty pharmacy.  Infusion Prescriptions (e.g. Remicade, Simponi Aria, Rituxan, Saphnelo, etc.) - These are sent to Outpatient Infusion Centers, so all scheduling requests of these medications should be directed to your local infusion center.    Medication Risks and Adverse Effects:  Immunosuppressed Status - Steroids and antirheumatic drugs are immunosuppressants, so they increase the risk of infections and can have side effects on various organ systems in your body, though most of them are uncommon.  Potential Side Effects - Be sure to read the drug package inserts to learn about the potential side effects of your medications before you start taking them and take them exactly as prescribed unless instructed otherwise.  In Case of Side Effects - If you experience any significant side effects, stop taking the medication immediately and promptly notify the prescriber. Depending on the severity of the side effects, consider going to an Urgent Care or Emergency Department for immediate attention.    Immunizations and Health Screening:  Vaccinations - If you are on immunosuppressive therapy, it is important that you are up to date on age-appropriate immunizations, particularly shingles and pneumonia vaccines, which you can request from your primary care provider or from us at your  next appointment.  Screening Tests - It is also important that you are up to date on age-appropriate screening tests, such as pap smear, mammography, and colonoscopy, which you can request from your primary care provider.    Educational and Supportive Resources:  [employer] [facility] ([email or web address]) - Visit our website to learn more about your condition and other rheumatic diseases, and gain access to many helpful resources for them.  Disposal of Old Medications (www.nate.gov/everyday-takeback-day) - Visit the Drug Enforcement Administration website to find a nearby location where you can properly dispose of old medications you no longer need.  Disposal of Used [place] (www.safeneedledisposal.org) - Visit the Safe Needle Disposal Organization website to find a nearby location where you can properly dispose of used needles from your injectable medications.

## 2024-11-13 NOTE — PROGRESS NOTES
[facility] RHEUMATOLOGY  [address], [address], [place] [address]  Phone: [phone] ? Fax: [phone]  [facility]/Health-Services/Rheumatology    FOLLOW-UP VISIT NOTE      DATE OF SERVICE: 11/13/2024         Subjective     PRIMARY CARE PRACTITIONER:  Raul Low M.D.  [address] [address]    PATIENT IDENTIFICATION:  Phuc Mcdowell  [address] [address]    YOB: 1945    MEDICAL RECORD NUMBER: [id number]          CHIEF COMPLAINT:   Chief Complaint   Patient presents with    Follow-Up     Psoriatic arthritis (HCC)       RHEUMATOLOGIC HISTORY:  Phuc Mcdowell is a 78 y.o. male with pertinent history notable for psoriatic arthritis diagnosed in 2000 preceded by plaque psoriasis diagnosed in the 1990s, osteoarthritis of shoulders, DJD/DDD of lumbosacral spine with sciatica, GERD, and T2DM among other comorbidities. Previously under the care of a rheumatologist in [facility] prior to relocating to [place], he initially presented on 4/19/22 to establish care. Reported minimal joint pain in his hands (mostly DIP joints) toward the end of infliximab infusion cycle, but no new or worsening skin plaques. Stated that his joint pain was sometimes associated with 30 minutes of morning stiffness that improved with activity. Reported some weight loss due to changes he made in his diet and physical activity, but otherwise doing well.    Pertinent treatments: Cyclosporine 100 mg daily (1990s-4/2022, deemed unnecessary), methotrexate 20>7.5>15 mg PO weekly with folic acid 1 mg daily (started 1990s, dose increased 4/2022-present, effective), infliximab 600>300 mg IV every 8 weeks (started 2000s, dose decreased 6/2023-present, effective), meloxicam 15 mg daily PRN (11/2024-present, helpful).    Pertinent positive labs: Elevated UPCR 0.247 and MACR 0.07 (in 8/2024).    Pertinent negative labs: Negative RF, anti-CCP, anti-CarP, anti-Infliximab Antibody, CRP, ESR,  and LFTs (in 11/2023), HBV and QTB (in 5/2024), eGFR and creatinine (in 8/2024), and acceptable CBC (in 10/2024).    Pertinent imaging studies: Pelvis (XR in 11/2021) with bilateral mild SI joint arthritis with partial osseous fusion. Shoulders (XR in 11/2021) with mild osteoarthritis of bilateral AC joints and left GH joint. Left elbow (XR in 7/2022) with no evidence of arthropathy. Lumbar spine (MRI in 11/2024 at [facility]) with degenerative changes most pronounced at L4-L5 with left lateral recess stenosis concerning for impingement upon the descending left L5 intraspinal nerve root      INTERVAL HISTORY:  Reports interval history as noted on the questionnaire below or scanned under media tab.  [facility] Rheumatology Established Patient History Form    11/12/2024 11:07 AM PST - Filed by Patient   MAIN REASON FOR VISIT Check up   INTERVAL HISTORY OF ILLNESS   Date of worsening onset:    Preceding incident/ailment:    Describe/list your symptoms: Mild intermittent aches   Exacerbating factors:    Alleviating factors:    Helpful medications:    Ineffective medications:    Severity of pain (scale of 1-10):    Personal/emotional stressors:    Josiah All The Areas Of Pain    REVIEW OF SYMPTOMS    General   Fevers    Chills    Night sweats    Malaise    Fatigue    Unintentional weight loss    Musculoskeletal   Joint pain Better with activity   Morning stiffness duration 30-60 mins   Morning stiffness characteristic Better with activity   Joint swelling    Joint instability    Tendon pain    Muscle pain    Body aches    Dermatologic   Hair loss with bald spots    Hair shedding    Skin thickening    Skin plaques    Sunlight-induced skin rash    Cold-induced color changes (white, purple, red on rewarming)    Neurologic/Psychiatric   Weakness    Spasms    Tingling    Burning    Numbness    Insomnia    Anxiety    Depression    Head/Eyes   Headaches    Temple pain    Dizziness    Dry eyes    Eye pain    Eye redness    Blurry vision     Vision loss    Ears/Nose   Ear pain    Ringing in ears    Vertigo    Hearing loss    Nasal ulcers    Nosebleeds    Sinus pain    Nasal congestion    Snoring    Mouth/Throat   Oral ulcers    Bleeding gums    Dry mouth    Cavities    Sore throat    Sticking in throat    Difficulty speaking    Neck/Lymphatics   Thyroid pain    Thyroid swelling    Lymph node swelling    Cardiac/Respiratory   Chest pain with breathing    Dry cough    Cough with bloody phlegm    Shortness of breath    Fast heartbeats    Irregular heartbeats    Gastrointestinal   Nausea    Vomiting    Difficulty swallowing    Heartburn    Abdominal pain    Bloody stool    Mucus stool    Genitourinary   Pelvic pain    Genital ulcers    Abnormal discharge    Burning urination    Frothy urine    Blood in urine        REVIEW OF SYSTEMS:  Except as noted in the history above, relevant review of systems with emphasis on autoimmune rheumatic diseases was otherwise negative.      CURRENT PROBLEM LIST:  Patient Active Problem List    Diagnosis Date Noted    DJD (degenerative joint disease), lumbosacral 11/13/2024    Allergies 09/16/2024    Migraine 09/16/2024    Anemia 09/16/2024    Proteinuria 08/10/2024    Acute left-sided low back pain without sciatica 02/01/2024    Need for vaccination 09/05/2023    Dyspnea on exertion 09/05/2023    Gastroesophageal reflux disease without esophagitis 05/02/2023    Primary osteoarthritis of both shoulders 04/25/2023    Chronic pain of left elbow 07/26/2022    Plaque psoriasis 04/19/2022    Immunosuppressed status (HCC) 04/17/2022    Dyslipidemia due to type 2 diabetes mellitus ([facility]) 04/12/2022    REM sleep behavior disorder 04/11/2022    Psoriatic arthritis ([facility]) 04/11/2022    Type 2 diabetes mellitus with hyperlipidemia ([facility]) 04/11/2022       PAST MEDICAL HISTORY:  Past Medical History:   Diagnosis Date    Back pain     Back pain     Chickenpox     Diabetes ([facility])     Double vision     Frequent urination     Heartburn      Hyperlipidemia     Influenza     Mumps     Nasal drainage     Painful joint     Psoriatic arthritis (HCC)     Rash     REM sleep behavior disorder     Rheumatoid arthritis (HCC)     Sleep apnea     Sore muscles     Tonsillitis     Wears glasses        PAST SURGICAL HISTORY:  Past Surgical History:   Procedure Laterality Date    CATARACT EXTRACTION WITH IOL Bilateral 2016    FL REMV 2ND CATARACT,CORN-SCLER SECTN         SOCIAL HISTORY:  Social History     Socioeconomic History    Marital status:      Spouse name: Not on file    Number of children: Not on file    Years of education: Not on file    Highest education level: Bachelor's degree (e.g., BA, AB, BS)   Occupational History    Not on file   Tobacco Use    Smoking status: Former    Smokeless tobacco: Never    Tobacco comments:     quit in 1994   Vaping Use    Vaping status: Never Used   Substance and Sexual Activity    Alcohol use: Yes     Alcohol/week: 1.2 oz     Types: 2 Glasses of wine per week     Comment: occasional glass of wine    Drug use: Never    Sexual activity: Yes     Partners: Female     Birth control/protection: None   Other Topics Concern    Not on file   Social History Narrative    Not on file     Social Drivers of Health     Financial Resource Strain: Low Risk  (11/1/2022)    Overall Financial Resource Strain (CARDIA)     Difficulty of Paying Living Expenses: Not very hard   Food Insecurity: No Food Insecurity (11/1/2022)    Hunger Vital Sign     Worried About Running Out of Food in the Last Year: Never true     Ran Out of Food in the Last Year: Never true   Transportation Needs: No Transportation Needs (11/1/2022)    PRAPARE - Transportation     Lack of Transportation (Medical): No     Lack of Transportation (Non-Medical): No   Physical Activity: Sufficiently Active (11/1/2022)    Exercise Vital Sign     Days of Exercise per Week: 3 days     Minutes of Exercise per Session: 60 min   Stress: No Stress Concern Present (11/1/2022)     [employer] [place] [employer] - Occupational Stress Questionnaire     Feeling of Stress : Not at all   Social Connections: Not on file   Intimate Partner Violence: Not on file   Housing Stability: Low Risk  (2022)    Housing Stability Vital Sign     Unable to Pay for Housing in the Last Year: No     Number of Places Lived in the Last Year: 2     Unstable Housing in the Last Year: No       FAMILY HISTORY:  Family History   Problem Relation Age of Onset    Cancer Mother         lung and brain -  at 88    Heart Disease Father         passed at 62    Hypertension Sister     Hypertension Brother     Cancer Brother        MEDICATIONS:  Current Outpatient Medications   Medication Sig    Aspirin 81 MG Cap 81 mg.    clonazePAM (KLONOPIN) 0.5 MG Tab Take 0.5 mg by mouth.    metformin (GLUCOPHAGE) 1000 MG tablet Take 1 Tablet by mouth 2 times a day with meals.    folic acid (FOLVITE) 1 MG Tab Take 1 Tablet by mouth every day. Except the day of methotrexate.    glimepiride (AMARYL) 2 MG Tab TAKE ONE TABLET BY MOUTH TWO TIMES A DAY.    pravastatin (PRAVACHOL) 20 MG Tab TAKE ONE TABLET BY MOUTH EVERY EVENING    omeprazole (PRILOSEC) 20 MG delayed-release capsule TAKE ONE CAPSULE BY MOUTH ONCE DAILY    meloxicam (MOBIC) 15 MG tablet Take one tablet by mouth with food daily. No advil/aleve/motrin/ibuprofen on same day.    donepezil (ARICEPT) 5 MG Tab 10 mg.    ipratropium (ATROVENT) 0.03 % Solution     rizatriptan (MAXALT-MLT) 10 MG disintegrating tablet Indications: Migraine Headache    methotrexate 2.5 MG tablet Take 6 Tablets by mouth every 7 days.    sildenafil citrate (VIAGRA) 25 MG Tab Take 25 mg by mouth 1 time a day as needed for Erectile Dysfunction.    Loratadine (KLS ALLERCLEAR PO)     inFLIXimab (REMICADE) 100 MG Recon Soln Infuse 600 mg into a venous catheter every 8 weeks.    Melatonin 10 MG Cap Take 20 mg by mouth every evening.    Acetaminophen (TYLENOL ARTHRITIS PAIN PO) Take 1,250 mg by mouth  "every day.    meloxicam (MOBIC) 15 MG tablet Take one tablet by mouth with food daily for 90 days. No advil/aleve/motrin/ibuprofen on same day.       ALLERGIES:   No Known Allergies    IMMUNIZATIONS:  Immunization History   Administered Date(s) Administered    Covid-19 Mrna (Spikevax) Moderna 12+ Years 10/12/2023, 10/04/2024    Influenza Vaccine Adult HD 10/04/2022    Influenza Vaccine Quad Inj (Preserved) 10/04/2020    Influenza Vaccine, Quadrivalent, Adjuvanted (Pf) 09/07/2020, 10/12/2023    Influenza high-dose trivalent (PF) 10/25/2016, 10/21/2019    Influenza, unspecified formulation 10/19/2018, 09/24/2024    MODERNA SARS-COV-2 VACCINE (12+) 01/30/2021, 03/03/2021, 08/20/2021, 10/04/2022    Pneumococcal Conjugate Vaccine (PCV20) 11/03/2023    Pneumococcal Conjugate Vaccine (Prevnar/PCV-13) 01/01/2014    Pneumococcal polysaccharide vaccine (PPSV-23) 02/27/2014    RSV ABRYSVO VACCINE 10/04/2024    Zoster Vaccine Recombinant (RZV) (SHINGRIX) 09/07/2020, 12/18/2020            Objective     Vital Signs: /66 (BP Location: Left arm, Patient Position: Sitting, BP Cuff Size: Adult)   Pulse 80   Temp 36.4 °C (97.6 °F) (Temporal)   Ht 1.702 m (5' 7\")   Wt 62.4 kg (137 lb 8 oz)   SpO2 98% Body mass index is 21.54 kg/m².    General: Appears well and comfortable  Eyes: No scleral or conjunctival lesions  ENT: No apparent oral, nasal, or ear lesions  Head/Neck: No apparent scalp or neck lesions  Cardiovascular: Regular rate and rhythm  Respiratory: Breathing quiet and unlabored  Gastrointestinal: No apparent organomegaly or abdominal masses  Integumentary: No significant cutaneous lesions or dyspigmentation  Musculoskeletal: No significant joint tenderness, swelling, warmth, erythema, or overt synovitis; no significant restriction in range of motion of joints examined  Neurologic: No focal sensory or motor deficits  Psychiatric: Mood and affect appropriate      LABORATORY RESULTS REVIEWED AND INTERPRETED BY " ME:  Lab Results   Component Value Date/Time    CREACTPROT 0.50 11/02/2023 01:37 PM    SEDRATEWES 17 11/02/2023 01:37 PM     Lab Results   Component Value Date/Time    RHEUMFACTN <10 11/02/2023 01:37 PM     Lab Results   Component Value Date/Time    TSHULTRASEN 2.180 08/10/2024 08:27 AM     Lab Results   Component Value Date/Time    WBC 7.3 10/28/2024 03:08 PM    RBC 3.97 (L) 10/28/2024 03:08 PM    HEMOGLOBIN 12.6 (L) 10/28/2024 03:08 PM    HEMATOCRIT 36.4 (L) 10/28/2024 03:08 PM    MCV 91.7 10/28/2024 03:08 PM    MCH 31.7 10/28/2024 03:08 PM    MCHC 34.6 10/28/2024 03:08 PM    RDW 46.2 10/28/2024 03:08 PM    PLATELETCT 180 10/28/2024 03:08 PM    MPV 9.4 10/28/2024 03:08 PM    NEUTS 3.48 10/28/2024 03:08 PM    LYMPHOCYTES 39.80 10/28/2024 03:08 PM    MONOCYTES 10.40 10/28/2024 03:08 PM    EOSINOPHILS 1.80 10/28/2024 03:08 PM    BASOPHILS 0.30 10/28/2024 03:08 PM     Lab Results   Component Value Date/Time    ASTSGOT 19 11/02/2023 01:37 PM    ALTSGPT 21 08/10/2024 08:27 AM    ALKPHOSPHAT 62 11/02/2023 01:37 PM    TBILIRUBIN 0.3 11/02/2023 01:37 PM    TOTPROTEIN 7.4 11/02/2023 01:37 PM    ALBUMIN 4.2 11/02/2023 01:37 PM     Lab Results   Component Value Date/Time    SODIUM 138 08/10/2024 08:27 AM    POTASSIUM 4.8 08/10/2024 08:27 AM    CHLORIDE 99 08/10/2024 08:27 AM    CO2 24 08/10/2024 08:27 AM    GLUCOSE 195 (H) 08/10/2024 08:27 AM    BUN 22 08/10/2024 08:27 AM    CREATININE 1.20 08/10/2024 08:27 AM    CALCIUM 10.3 08/10/2024 08:27 AM     Lab Results   Component Value Date/Time    TOTPROTUR 14.0 08/12/2024 11:03 AM    MICROALBUR 6.2 08/10/2024 08:27 AM    CREATININEU 56.79 08/12/2024 11:03 AM    MALBCRT 70 (H) 08/10/2024 08:27 AM     Lab Results   Component Value Date/Time    HEPBSAG Non-Reactive 05/14/2024 11:35 AM    HEPBCORTOT NonReactive 05/14/2024 11:35 AM     Lab Results   Component Value Date/Time    CHOLSTRLTOT 152 08/10/2024 08:27 AM    LDL 78 08/10/2024 08:27 AM    HDL 44 08/10/2024 08:27 AM     TRIGLYCERIDE 149 08/10/2024 08:27 AM    HBA1C 7.0 (H) 08/10/2024 08:27 AM       RADIOLOGY RESULTS REVIEWED AND INTERPRETED BY ME:    Results for orders placed in visit on 04/12/22    DX-SHOULDER 2+    Results for orders placed in visit on 04/12/22    DX-PELVIS-1 OR 2 VIEWS      All relevant laboratory and imaging results reported on this note were reviewed and interpreted by me.         Assessment & Plan     Phuc Mcdowell is a 78 y.o. male with history and physical as noted above whose presentation merits the following clinical impressions and recommendations:    1. Psoriatic arthritis (HCC)  Clinically and serologically quiescent in remission with no significant evidence of disease activity on the current regimen of methotrexate and infliximab, so no need for modification of treatment. However, given the potential for smoldering disease activity with limited clinical manifestations, need to occasionally reassess markers of disease activity and risk stratification to gauge overall trajectory in response to ongoing treatment.  - CRP QUANTITIVE (NON-CARDIAC); Future  - Sed Rate; Future  - Continue methotrexate 15 mg PO weekly with folic acid 1 mg daily except the day of methotrexate  - Continue infliximab 300 mg IV infusion every 8 weeks  - Consider further de-escalation of immunosuppression if deemed to be in complete remission    2. Plaque psoriasis  Clinically and serologically quiescent in remission with no significant evidence of disease activity on the current regimen of methotrexate and infliximab, so no need for modification of treatment.  - Continue methotrexate 15 mg PO weekly with folic acid 1 mg daily except the day of methotrexate  - Continue infliximab 300 mg IV infusion every 8 weeks  - Consider further de-escalation of immunosuppression if deemed to be in complete remission    3. DJD (degenerative joint disease), lumbosacral  Presumably the etiology of his intermittent lower back pain which can be  managed supportively.  - Lidocaine 4% Patch, Tylenol Arthritis, and meloxicam 15 mg daily PRN  - Consider referral to interventional pain management if that becomes necessary    4. Primary osteoarthritis of both shoulders  Presumably the etiology of his intermittent shoulder pain which can be managed supportively.  - Tylenol Arthritis and Voltaren 1% gel with or without meloxicam 15 mg daily PRN  - Consider intra-articular steroid injection if that becomes necessary    5. Immunosuppressed status (HCC)  High risk immunosuppressant use requiring routine monitoring labs prior to every visit to assess for possible side effects including myelotoxicity, hepatotoxicity, and nephrotoxicity. Presently with no history, physical, or laboratory evidence to suggest significant adverse drug effects or opportunistic infections.  - CBC WITH DIFFERENTIAL; Future  - Comp Metabolic Panel; Future  - Need to ascertain age-appropriate vaccines in addition to the ones listed above under immunizations      The above assessment and plan were discussed with the patient who acknowledged understanding of the plan.    FOLLOW-UP: Return in about 6 months (around 5/13/2025) for Short.         Thank you for the opportunity to participate in the care of Phuc Mcdowell.    Piero Lewis MD, MS, FACR  Rheumatologist, [facility] Rheumatology, [facility]   of Clinical Medicine, Department of Internal Medicine  [facility]

## 2024-11-14 ENCOUNTER — APPOINTMENT (RX ONLY)
Dept: URBAN - METROPOLITAN AREA CLINIC 22 | Facility: CLINIC | Age: 79
Setting detail: DERMATOLOGY
End: 2024-11-14

## 2024-11-14 DIAGNOSIS — Z71.89 OTHER SPECIFIED COUNSELING: ICD-10-CM

## 2024-11-14 DIAGNOSIS — L82.1 OTHER SEBORRHEIC KERATOSIS: ICD-10-CM

## 2024-11-14 DIAGNOSIS — L81.4 OTHER MELANIN HYPERPIGMENTATION: ICD-10-CM

## 2024-11-14 DIAGNOSIS — D18.0 HEMANGIOMA: ICD-10-CM

## 2024-11-14 DIAGNOSIS — L57.0 ACTINIC KERATOSIS: ICD-10-CM

## 2024-11-14 DIAGNOSIS — L40.0 PSORIASIS VULGARIS: ICD-10-CM

## 2024-11-14 DIAGNOSIS — D22 MELANOCYTIC NEVI: ICD-10-CM

## 2024-11-14 PROBLEM — D22.5 MELANOCYTIC NEVI OF TRUNK: Status: ACTIVE | Noted: 2024-11-14

## 2024-11-14 PROBLEM — D18.01 HEMANGIOMA OF SKIN AND SUBCUTANEOUS TISSUE: Status: ACTIVE | Noted: 2024-11-14

## 2024-11-14 PROCEDURE — 99213 OFFICE O/P EST LOW 20 MIN: CPT | Mod: 25

## 2024-11-14 PROCEDURE — ? ADDITIONAL NOTES

## 2024-11-14 PROCEDURE — ? LIQUID NITROGEN

## 2024-11-14 PROCEDURE — 17000 DESTRUCT PREMALG LESION: CPT

## 2024-11-14 PROCEDURE — ? SUNSCREEN RECOMMENDATIONS

## 2024-11-14 PROCEDURE — ? COUNSELING

## 2024-11-14 ASSESSMENT — LOCATION ZONE DERM
LOCATION ZONE: TRUNK
LOCATION ZONE: FACE
LOCATION ZONE: ARM

## 2024-11-14 ASSESSMENT — LOCATION DETAILED DESCRIPTION DERM
LOCATION DETAILED: LEFT INFERIOR UPPER BACK
LOCATION DETAILED: LEFT INFERIOR PREAURICULAR CHEEK
LOCATION DETAILED: RIGHT MEDIAL INFERIOR CHEST
LOCATION DETAILED: LEFT PROXIMAL DORSAL FOREARM
LOCATION DETAILED: RIGHT SUPERIOR LATERAL MIDBACK

## 2024-11-14 ASSESSMENT — LOCATION SIMPLE DESCRIPTION DERM
LOCATION SIMPLE: LEFT CHEEK
LOCATION SIMPLE: LEFT UPPER BACK
LOCATION SIMPLE: RIGHT LOWER BACK
LOCATION SIMPLE: CHEST
LOCATION SIMPLE: LEFT FOREARM

## 2024-11-14 NOTE — PROCEDURE: ADDITIONAL NOTES
Additional Notes: Patient has hx of psoriasis and psoriatic arthritis.   He was is on MTX and remicaide, he is doing this under the direction of Dr. Pruett    He is currently stable and does not require any topical therapy.
Detail Level: Simple
Render Risk Assessment In Note?: no

## 2024-11-14 NOTE — PROCEDURE: LIQUID NITROGEN
Duration Of Freeze Thaw-Cycle (Seconds): 3
Show Aperture Variable?: Yes
Consent: The patient's consent was obtained including but not limited to risks of crusting, scabbing, blistering, scarring, darker or lighter pigmentary change, recurrence, incomplete removal and infection.
Render Post-Care Instructions In Note?: no
Post-Care Instructions: I reviewed with the patient in detail post-care instructions. Patient is to wear sunprotection, and avoid picking at any of the treated lesions. Pt may apply Vaseline to crusted or scabbing areas.
Number Of Freeze-Thaw Cycles: 2 freeze-thaw cycles
Detail Level: Detailed

## 2024-11-22 ENCOUNTER — TELEPHONE (OUTPATIENT)
Dept: SLEEP MEDICINE | Facility: MEDICAL CENTER | Age: 79
End: 2024-11-22
Payer: MEDICARE

## 2024-12-23 ENCOUNTER — OUTPATIENT INFUSION SERVICES (OUTPATIENT)
Dept: ONCOLOGY | Facility: MEDICAL CENTER | Age: 79
End: 2024-12-23
Attending: STUDENT IN AN ORGANIZED HEALTH CARE EDUCATION/TRAINING PROGRAM
Payer: MEDICARE

## 2024-12-23 VITALS
BODY MASS INDEX: 22.32 KG/M2 | RESPIRATION RATE: 18 BRPM | TEMPERATURE: 98 F | DIASTOLIC BLOOD PRESSURE: 73 MMHG | HEART RATE: 77 BPM | SYSTOLIC BLOOD PRESSURE: 137 MMHG | WEIGHT: 138.89 LBS | HEIGHT: 66 IN | OXYGEN SATURATION: 98 %

## 2024-12-23 DIAGNOSIS — L40.0 PLAQUE PSORIASIS: ICD-10-CM

## 2024-12-23 DIAGNOSIS — L40.50 PSORIATIC ARTHRITIS (HCC): ICD-10-CM

## 2024-12-23 PROCEDURE — 96413 CHEMO IV INFUSION 1 HR: CPT

## 2024-12-23 PROCEDURE — 700105 HCHG RX REV CODE 258: Performed by: STUDENT IN AN ORGANIZED HEALTH CARE EDUCATION/TRAINING PROGRAM

## 2024-12-23 PROCEDURE — 700111 HCHG RX REV CODE 636 W/ 250 OVERRIDE (IP): Mod: JZ,JG | Performed by: STUDENT IN AN ORGANIZED HEALTH CARE EDUCATION/TRAINING PROGRAM

## 2024-12-23 RX ORDER — 0.9 % SODIUM CHLORIDE 0.9 %
10 VIAL (ML) INJECTION PRN
OUTPATIENT
Start: 2025-02-17

## 2024-12-23 RX ORDER — ACETAMINOPHEN 325 MG/1
650 TABLET ORAL ONCE
Status: DISCONTINUED | OUTPATIENT
Start: 2024-12-23 | End: 2024-12-23 | Stop reason: HOSPADM

## 2024-12-23 RX ORDER — EPINEPHRINE 1 MG/ML(1)
0.5 AMPUL (ML) INJECTION PRN
OUTPATIENT
Start: 2025-02-17

## 2024-12-23 RX ORDER — 0.9 % SODIUM CHLORIDE 0.9 %
VIAL (ML) INJECTION PRN
OUTPATIENT
Start: 2025-02-17

## 2024-12-23 RX ORDER — 0.9 % SODIUM CHLORIDE 0.9 %
3 VIAL (ML) INJECTION PRN
OUTPATIENT
Start: 2025-02-17

## 2024-12-23 RX ORDER — DIPHENHYDRAMINE HYDROCHLORIDE 50 MG/ML
25 INJECTION INTRAMUSCULAR; INTRAVENOUS ONCE
Status: DISCONTINUED | OUTPATIENT
Start: 2024-12-23 | End: 2024-12-23 | Stop reason: HOSPADM

## 2024-12-23 RX ORDER — SODIUM CHLORIDE 9 MG/ML
INJECTION, SOLUTION INTRAVENOUS CONTINUOUS
OUTPATIENT
Start: 2025-02-17

## 2024-12-23 RX ORDER — METHYLPREDNISOLONE SODIUM SUCCINATE 125 MG/2ML
125 INJECTION, POWDER, LYOPHILIZED, FOR SOLUTION INTRAMUSCULAR; INTRAVENOUS PRN
OUTPATIENT
Start: 2025-02-17

## 2024-12-23 RX ORDER — DIPHENHYDRAMINE HYDROCHLORIDE 50 MG/ML
50 INJECTION INTRAMUSCULAR; INTRAVENOUS PRN
OUTPATIENT
Start: 2025-02-17

## 2024-12-23 RX ORDER — ACETAMINOPHEN 325 MG/1
650 TABLET ORAL ONCE
OUTPATIENT
Start: 2025-02-17

## 2024-12-23 RX ADMIN — INFLIXIMAB-AXXQ 300 MG: 100 INJECTION, POWDER, LYOPHILIZED, FOR SOLUTION INTRAVENOUS at 08:31

## 2024-12-23 ASSESSMENT — FIBROSIS 4 INDEX: FIB4 SCORE: 1.77

## 2024-12-26 DIAGNOSIS — E78.5 TYPE 2 DIABETES MELLITUS WITH HYPERLIPIDEMIA (HCC): ICD-10-CM

## 2024-12-26 DIAGNOSIS — E11.69 TYPE 2 DIABETES MELLITUS WITH HYPERLIPIDEMIA (HCC): ICD-10-CM

## 2024-12-26 DIAGNOSIS — E78.2 MIXED HYPERLIPIDEMIA: ICD-10-CM

## 2024-12-31 RX ORDER — PRAVASTATIN SODIUM 20 MG
20 TABLET ORAL EVERY EVENING
Qty: 90 TABLET | Refills: 0 | Status: SHIPPED | OUTPATIENT
Start: 2024-12-31

## 2024-12-31 RX ORDER — GLIMEPIRIDE 2 MG/1
2 TABLET ORAL 2 TIMES DAILY
Qty: 180 TABLET | Refills: 0 | Status: SHIPPED | OUTPATIENT
Start: 2024-12-31

## 2024-12-31 NOTE — TELEPHONE ENCOUNTER
Received request via: Pharmacy    Was the patient seen in the last year in this department? Yes    Does the patient have an active prescription (recently filled or refills available) for medication(s) requested? No    [facility]    Does the patient have intermediate Plus and need 100-day supply? (This applies to ALL medications) Patient does not have SCP

## 2025-02-02 ENCOUNTER — OFFICE VISIT (OUTPATIENT)
Dept: URGENT CARE | Facility: PHYSICIAN GROUP | Age: 80
End: 2025-02-02
Payer: MEDICARE

## 2025-02-02 VITALS
OXYGEN SATURATION: 95 % | WEIGHT: 133.38 LBS | TEMPERATURE: 97.5 F | HEART RATE: 109 BPM | DIASTOLIC BLOOD PRESSURE: 60 MMHG | BODY MASS INDEX: 20.93 KG/M2 | RESPIRATION RATE: 25 BRPM | HEIGHT: 67 IN | SYSTOLIC BLOOD PRESSURE: 112 MMHG

## 2025-02-02 DIAGNOSIS — J01.90 ACUTE BACTERIAL SINUSITIS: ICD-10-CM

## 2025-02-02 DIAGNOSIS — B96.89 ACUTE BACTERIAL SINUSITIS: ICD-10-CM

## 2025-02-02 PROCEDURE — 3074F SYST BP LT 130 MM HG: CPT

## 2025-02-02 PROCEDURE — 3078F DIAST BP <80 MM HG: CPT

## 2025-02-02 PROCEDURE — 99213 OFFICE O/P EST LOW 20 MIN: CPT

## 2025-02-02 ASSESSMENT — ENCOUNTER SYMPTOMS
CHILLS: 0
SINUS PAIN: 1
COUGH: 1
FEVER: 0

## 2025-02-02 ASSESSMENT — FIBROSIS 4 INDEX: FIB4 SCORE: 1.77

## 2025-02-02 NOTE — PROGRESS NOTES
CHIEF COMPLAINT  Chief Complaint   Patient presents with    Sore Throat     C/o sinus pressure, headache, slight cough, and bloody nose x 3 months.     Subjective:   Phuc Mcdowell is a 79 y.o. male who presents to urgent care with concerns for symptoms of mild congested cough, intermittent headache, sinus pressure/pain as well as sinus congestion.  Patient reports he has been experiencing symptoms over the last 3 weeks, and has noted over the last several days symptoms seem to progressively be worsening.  He does report that he follows with the ENT biannually as he does have a chronic history of sin      Review of Systems   Constitutional:  Negative for chills and fever.   HENT:  Positive for congestion and sinus pain.    Respiratory:  Positive for cough.        PAST MEDICAL HISTORY  Patient Active Problem List    Diagnosis Date Noted    DJD (degenerative joint disease), lumbosacral 11/13/2024    Allergies 09/16/2024    Migraine 09/16/2024    Anemia 09/16/2024    Proteinuria 08/10/2024    Acute left-sided low back pain without sciatica 02/01/2024    Need for vaccination 09/05/2023    Dyspnea on exertion 09/05/2023    Gastroesophageal reflux disease without esophagitis 05/02/2023    Primary osteoarthritis of both shoulders 04/25/2023    Chronic pain of left elbow 07/26/2022    Plaque psoriasis 04/19/2022    Immunosuppressed status (HCC) 04/17/2022    Dyslipidemia due to type 2 diabetes mellitus (Former[facility]) 04/12/2022    REM sleep behavior disorder 04/11/2022    Psoriatic arthritis (HCC) 04/11/2022    Type 2 diabetes mellitus with hyperlipidemia (HCC) 04/11/2022       SURGICAL HISTORY   has a past surgical history that includes cataract extraction with iol (Bilateral, 2016) and remv 2nd cataract,corn-scler sectn.    ALLERGIES  No Known Allergies    CURRENT MEDICATIONS  Home Medications       Reviewed by Luis Fernando Good Ass't (Medical Assistant) on 02/02/25 at 1212  Med List Status: <None>     Medication Last  "Dose Status   Acetaminophen (TYLENOL ARTHRITIS PAIN PO) Taking Active   Aspirin 81 MG Cap Taking Active   clonazePAM (KLONOPIN) 0.5 MG Tab Taking Active   donepezil (ARICEPT) 5 MG Tab Taking Active   folic acid (FOLVITE) 1 MG Tab Taking Active   glimepiride (AMARYL) 2 MG Tab  Active   inFLIXimab (REMICADE) 100 MG Recon Soln Taking Active   ipratropium (ATROVENT) 0.03 % Solution Taking Active   Loratadine (KLS ALLERCLEAR PO) Taking Active   Melatonin 10 MG Cap Taking Active   meloxicam (MOBIC) 15 MG tablet Taking Active   meloxicam (MOBIC) 15 MG tablet Taking Active   metformin (GLUCOPHAGE) 1000 MG tablet Taking Active   methotrexate 2.5 MG tablet Taking Active   omeprazole (PRILOSEC) 20 MG delayed-release capsule  Active   pravastatin (PRAVACHOL) 20 MG Tab  Active   rizatriptan (MAXALT-MLT) 10 MG disintegrating tablet Taking Active   sildenafil citrate (VIAGRA) 25 MG Tab Taking Active                    SOCIAL HISTORY  Social History     Tobacco Use    Smoking status: Former    Smokeless tobacco: Never    Tobacco comments:     quit in    Vaping Use    Vaping status: Never Used   Substance and Sexual Activity    Alcohol use: Yes     Alcohol/week: 1.2 oz     Types: 2 Glasses of wine per week     Comment: occasional glass of wine    Drug use: Never    Sexual activity: Yes     Partners: Female     Birth control/protection: None       FAMILY HISTORY  Family History   Problem Relation Age of Onset    Cancer Mother         lung and brain -  at 88    Heart Disease Father         passed at 62    Hypertension Sister     Hypertension Brother     Cancer Brother          Medications, Allergies, and current problem list reviewed today in Epic.     Objective:     /60 (BP Location: Left arm, Patient Position: Sitting, BP Cuff Size: Adult)   Pulse (!) 109   Temp 36.4 °C (97.5 °F) (Temporal)   Resp (!) 25   Ht 1.702 m (5' 7\")   Wt 60.5 kg (133 lb 6.1 oz)   SpO2 95%     Physical Exam  Vitals reviewed. "   Constitutional:       General: He is not in acute distress.     Appearance: Normal appearance. He is normal weight. He is not ill-appearing or toxic-appearing.   HENT:      Head: Normocephalic.      Right Ear: Tympanic membrane normal.      Left Ear: Tympanic membrane normal.      Nose: Congestion present.      Right Sinus: Maxillary sinus tenderness present.      Left Sinus: Maxillary sinus tenderness present.      Mouth/Throat:      Mouth: Mucous membranes are moist.      Pharynx: Oropharynx is clear. Postnasal drip present. No oropharyngeal exudate or posterior oropharyngeal erythema.   Cardiovascular:      Rate and Rhythm: Normal rate and regular rhythm.      Pulses: Normal pulses.      Heart sounds: Normal heart sounds.   Pulmonary:      Effort: Pulmonary effort is normal. No respiratory distress.      Breath sounds: Normal breath sounds. No stridor. No wheezing, rhonchi or rales.   Musculoskeletal:      Cervical back: Neck supple.   Skin:     General: Skin is warm.      Capillary Refill: Capillary refill takes less than 2 seconds.   Neurological:      General: No focal deficit present.      Mental Status: He is alert.   Psychiatric:         Mood and Affect: Mood normal.         Assessment/Plan:     Diagnosis and associated orders:     1. Acute bacterial sinusitis  amoxicillin-clavulanate (AUGMENTIN) 875-125 MG Tab         Comments/MDM:     Patient meets IDSA guidelines for ABRS given duration of symptoms, worsening severity, clinical history and physical exam  Consider antihistamine, nasal steroid, anti-inflammatory, and saline rinses  No evidence of venous sinus thrombosis or more serious etiology  Typically these infections display a slow resolution of symptoms starting at 48-72 hours of treatment.  Mild pressure and pain may continue at end of week of antibiotics which is normal and continue the other supportive care until back to baseline.   Follow-up with ENT         Differential diagnosis, natural  history, supportive care, and indications for immediate follow-up discussed.    Advised the patient to follow-up with the primary care physician for recheck, reevaluation, and consideration of further management.    Please note that this dictation was created using voice recognition software. I have made a reasonable attempt to correct obvious errors, but I expect that there are errors of grammar and possibly content that I did not discover before finalizing the note.    This note was electronically signed by JONE Ramachandran

## 2025-02-12 ENCOUNTER — OFFICE VISIT (OUTPATIENT)
Dept: MEDICAL GROUP | Facility: PHYSICIAN GROUP | Age: 80
End: 2025-02-12
Payer: MEDICARE

## 2025-02-12 VITALS
SYSTOLIC BLOOD PRESSURE: 112 MMHG | BODY MASS INDEX: 21.19 KG/M2 | HEART RATE: 88 BPM | TEMPERATURE: 97.7 F | OXYGEN SATURATION: 97 % | HEIGHT: 67 IN | WEIGHT: 135 LBS | RESPIRATION RATE: 16 BRPM | DIASTOLIC BLOOD PRESSURE: 62 MMHG

## 2025-02-12 DIAGNOSIS — E78.5 DYSLIPIDEMIA DUE TO TYPE 2 DIABETES MELLITUS (HCC): Chronic | ICD-10-CM

## 2025-02-12 DIAGNOSIS — E11.69 TYPE 2 DIABETES MELLITUS WITH HYPERLIPIDEMIA (HCC): Chronic | ICD-10-CM

## 2025-02-12 DIAGNOSIS — R80.0 ISOLATED PROTEINURIA WITHOUT SPECIFIC MORPHOLOGIC LESION: ICD-10-CM

## 2025-02-12 DIAGNOSIS — G47.52 REM SLEEP BEHAVIOR DISORDER: Chronic | ICD-10-CM

## 2025-02-12 DIAGNOSIS — E11.69 DYSLIPIDEMIA DUE TO TYPE 2 DIABETES MELLITUS (HCC): Chronic | ICD-10-CM

## 2025-02-12 DIAGNOSIS — Z11.59 NEED FOR HEPATITIS C SCREENING TEST: ICD-10-CM

## 2025-02-12 DIAGNOSIS — R09.81 SINUS CONGESTION: ICD-10-CM

## 2025-02-12 DIAGNOSIS — G43.009 MIGRAINE WITHOUT AURA AND WITHOUT STATUS MIGRAINOSUS, NOT INTRACTABLE: Chronic | ICD-10-CM

## 2025-02-12 DIAGNOSIS — L40.50 PSORIATIC ARTHRITIS (HCC): Chronic | ICD-10-CM

## 2025-02-12 DIAGNOSIS — E78.5 TYPE 2 DIABETES MELLITUS WITH HYPERLIPIDEMIA (HCC): Chronic | ICD-10-CM

## 2025-02-12 DIAGNOSIS — D64.9 ANEMIA, UNSPECIFIED TYPE: Chronic | ICD-10-CM

## 2025-02-12 DIAGNOSIS — K21.9 GASTROESOPHAGEAL REFLUX DISEASE WITHOUT ESOPHAGITIS: Chronic | ICD-10-CM

## 2025-02-12 LAB
HBA1C MFR BLD: 7.4 % (ref ?–5.8)
POCT INT CON NEG: NEGATIVE
POCT INT CON POS: POSITIVE

## 2025-02-12 PROCEDURE — 83036 HEMOGLOBIN GLYCOSYLATED A1C: CPT | Performed by: INTERNAL MEDICINE

## 2025-02-12 PROCEDURE — 3074F SYST BP LT 130 MM HG: CPT | Performed by: INTERNAL MEDICINE

## 2025-02-12 PROCEDURE — 99215 OFFICE O/P EST HI 40 MIN: CPT | Performed by: INTERNAL MEDICINE

## 2025-02-12 PROCEDURE — 3078F DIAST BP <80 MM HG: CPT | Performed by: INTERNAL MEDICINE

## 2025-02-12 RX ORDER — CLONAZEPAM 0.5 MG/1
1 TABLET ORAL
COMMUNITY
End: 2025-02-26

## 2025-02-12 RX ORDER — LISINOPRIL 2.5 MG/1
2.5 TABLET ORAL DAILY
Qty: 100 TABLET | Refills: 3 | Status: SHIPPED
Start: 2025-02-12 | End: 2025-02-12 | Stop reason: CLARIF

## 2025-02-12 RX ORDER — EMPAGLIFLOZIN 10 MG/1
10 TABLET, FILM COATED ORAL DAILY
Qty: 100 TABLET | Refills: 3 | Status: SHIPPED | OUTPATIENT
Start: 2025-02-12

## 2025-02-12 ASSESSMENT — PATIENT HEALTH QUESTIONNAIRE - PHQ9: CLINICAL INTERPRETATION OF PHQ2 SCORE: 0

## 2025-02-12 ASSESSMENT — FIBROSIS 4 INDEX: FIB4 SCORE: 1.77

## 2025-02-12 NOTE — ASSESSMENT & PLAN NOTE
Chronic  condition.  Patient followed by sleep specialist.    Patient presently taking Klonopin 1 mg at bedtime.  Medication prescribed by sleep specialist.  No significant side effects reported.

## 2025-02-12 NOTE — ASSESSMENT & PLAN NOTE
This is a new condition.  The patient is concerned that he may have possible sinus infection.  Denies fever or chills.

## 2025-02-12 NOTE — ASSESSMENT & PLAN NOTE
Chronic condition.  Patient followed by neurologist.  Patient is taking Maxalt as needed.  The patient asymptomatic.

## 2025-02-12 NOTE — PROGRESS NOTES
PRIMARY CARE CLINIC VISIT        Chief Complaint   Patient presents with    Follow-Up     Urgent care/sinus congestion       Sinus congestion  Follow-up diabetes  Psoriatic arthritis  Proteinuria  REM sleep disorder  Hyperlipidemia  Acid reflux  Migraine  Anemia        History of Present Illness     Type 2 diabetes mellitus with hyperlipidemia (HCC)  This is a chronic condition.  The patient presently taking glimepiride, metformin.  Previous A1c 7%.  No significant side effects reported.  The patient denies significant hypoglycemia.    Psoriatic arthritis (HCC)  Chronic condition.  Patient followed by rheumatology service.  Patient being treated with Remicade and methotrexate..  No new symptoms reported.    Proteinuria  This is a chronic condition.  Noted with previous lab test.  Patient asymptomatic.    REM sleep behavior disorder  Chronic  condition.  Patient followed by sleep specialist.    Patient presently taking Klonopin 1 mg at bedtime.  Medication prescribed by sleep specialist.  No significant side effects reported.    Dyslipidemia due to type 2 diabetes mellitus (HCC)  Chronic ongoing condition.  The patient presently taking pravastatin.  Patient tolerating medication well.    Gastroesophageal reflux disease without esophagitis  Chronic condition.  Currently taking omeprazole.  The patient denies nausea vomiting or dysphagia.    Migraine  Chronic condition.  Patient followed by neurologist.  Patient is taking Maxalt as needed.  The patient asymptomatic.    Anemia  Chronic condition.  The patient was noted with normocytic anemia.  Hemoglobin slightly low at the last couple of years.  Ranging between 12 and 13's.  The patient denies a history of bleeding.  Lab test ordered for follow-up including iron study B12 folate and FOBT    Sinus congestion  This is a new condition.  The patient is concerned that he may have possible sinus infection.  Denies fever or chills.    Current Outpatient Medications on File  Prior to Visit   Medication Sig Dispense Refill    clonazePAM (KLONOPIN) 0.5 MG Tab Take 1 mg by mouth at bedtime.      pravastatin (PRAVACHOL) 20 MG Tab TAKE ONE TABLET BY MOUTH EVERY EVENING 90 Tablet 0    omeprazole (PRILOSEC) 20 MG delayed-release capsule TAKE ONE CAPSULE BY MOUTH ONCE DAILY 90 Capsule 0    Aspirin 81 MG Cap 81 mg.      metformin (GLUCOPHAGE) 1000 MG tablet Take 1 Tablet by mouth 2 times a day with meals. 180 Tablet 3    folic acid (FOLVITE) 1 MG Tab Take 1 Tablet by mouth every day. Except the day of methotrexate. 90 Tablet 3    meloxicam (MOBIC) 15 MG tablet Take one tablet by mouth with food daily. No advil/aleve/motrin/ibuprofen on same day. 30 Tablet 2    donepezil (ARICEPT) 5 MG Tab 10 mg.      ipratropium (ATROVENT) 0.03 % Solution       rizatriptan (MAXALT-MLT) 10 MG disintegrating tablet Indications: Migraine Headache      methotrexate 2.5 MG tablet Take 6 Tablets by mouth every 7 days. 78 Tablet 3    sildenafil citrate (VIAGRA) 25 MG Tab Take 25 mg by mouth 1 time a day as needed for Erectile Dysfunction.      Loratadine (KLS ALLERCLEAR PO)       inFLIXimab (REMICADE) 100 MG Recon Soln Infuse 600 mg into a venous catheter every 8 weeks.      Melatonin 10 MG Cap Take 20 mg by mouth every evening.      Acetaminophen (TYLENOL ARTHRITIS PAIN PO) Take 1,250 mg by mouth every day.       No current facility-administered medications on file prior to visit.        Allergies: Patient has no known allergies.    Current Outpatient Medications Ordered in Epic   Medication Sig Dispense Refill    Empagliflozin (JARDIANCE) 10 MG Tab tablet Take 1 Tablet by mouth every day. 100 Tablet 3    clonazePAM (KLONOPIN) 0.5 MG Tab Take 1 mg by mouth at bedtime.      pravastatin (PRAVACHOL) 20 MG Tab TAKE ONE TABLET BY MOUTH EVERY EVENING 90 Tablet 0    omeprazole (PRILOSEC) 20 MG delayed-release capsule TAKE ONE CAPSULE BY MOUTH ONCE DAILY 90 Capsule 0    Aspirin 81 MG Cap 81 mg.      metformin (GLUCOPHAGE)  1000 MG tablet Take 1 Tablet by mouth 2 times a day with meals. 180 Tablet 3    folic acid (FOLVITE) 1 MG Tab Take 1 Tablet by mouth every day. Except the day of methotrexate. 90 Tablet 3    meloxicam (MOBIC) 15 MG tablet Take one tablet by mouth with food daily. No advil/aleve/motrin/ibuprofen on same day. 30 Tablet 2    donepezil (ARICEPT) 5 MG Tab 10 mg.      ipratropium (ATROVENT) 0.03 % Solution       rizatriptan (MAXALT-MLT) 10 MG disintegrating tablet Indications: Migraine Headache      methotrexate 2.5 MG tablet Take 6 Tablets by mouth every 7 days. 78 Tablet 3    sildenafil citrate (VIAGRA) 25 MG Tab Take 25 mg by mouth 1 time a day as needed for Erectile Dysfunction.      Loratadine (KLS ALLERCLEAR PO)       inFLIXimab (REMICADE) 100 MG Recon Soln Infuse 600 mg into a venous catheter every 8 weeks.      Melatonin 10 MG Cap Take 20 mg by mouth every evening.      Acetaminophen (TYLENOL ARTHRITIS PAIN PO) Take 1,250 mg by mouth every day.       No current [facility]-ordered facility-administered medications on file.       Past Medical History:   Diagnosis Date    Back pain     Back pain     Chickenpox     Diabetes (HCC)     Double vision     Frequent urination     Heartburn     Hyperlipidemia     Influenza     Mumps     Nasal drainage     Painful joint     Psoriatic arthritis (HCC)     Rash     REM sleep behavior disorder     Rheumatoid arthritis (HCC)     Sleep apnea     Sore muscles     Tonsillitis     Wears glasses        Past Surgical History:   Procedure Laterality Date    CATARACT EXTRACTION WITH IOL Bilateral     FL REMV 2ND CATARACT,CORN-SCLER SECTN         Family History   Problem Relation Age of Onset    Cancer Mother         lung and brain -  at 88    Heart Disease Father         passed at 62    Hypertension Sister     Hypertension Brother     Cancer Brother        Social History     Tobacco Use   Smoking Status Former   Smokeless Tobacco Never   Tobacco Comments    quit in        Social  "History     Substance and Sexual Activity   Alcohol Use Yes    Alcohol/week: 1.2 oz    Types: 2 Glasses of wine per week    Comment: occasional glass of wine       Review of systems  As per HPI above. All other systems reviewed and negative.      Past Medical, Social, and Family history reviewed and updated in [facility]       LAB DATA:     I have independently reviewed / interpreted labs    Lab Results   Component Value Date/Time    HBA1C 7.4 (A) 02/12/2025 10:35 AM    HBA1C 7.0 (H) 08/10/2024 08:27 AM    HBA1C 6.8 (H) 12/21/2023 09:58 AM        Lab Results   Component Value Date/Time    WBC 7.3 10/28/2024 03:08 PM    HEMOGLOBIN 12.6 (L) 10/28/2024 03:08 PM    HEMATOCRIT 36.4 (L) 10/28/2024 03:08 PM    MCV 91.7 10/28/2024 03:08 PM    PLATELETCT 180 10/28/2024 03:08 PM       Lab Results   Component Value Date/Time    SODIUM 138 08/10/2024 08:27 AM    POTASSIUM 4.8 08/10/2024 08:27 AM    GLUCOSE 195 (H) 08/10/2024 08:27 AM    BUN 22 08/10/2024 08:27 AM    CREATININE 1.20 08/10/2024 08:27 AM       Lab Results   Component Value Date/Time    CHOLSTRLTOT 152 08/10/2024 08:27 AM    TRIGLYCERIDE 149 08/10/2024 08:27 AM    HDL 44 08/10/2024 08:27 AM    LDL 78 08/10/2024 08:27 AM       Lab Results   Component Value Date/Time    ALTSGPT 21 08/10/2024 08:27 AM          Objective     /62 (BP Location: Left arm, Patient Position: Sitting, BP Cuff Size: Adult)   Pulse 88   Temp 36.5 °C (97.7 °F) (Temporal)   Resp 16   Ht 1.702 m (5' 7\")   Wt 61.2 kg (135 lb)   SpO2 97%    Body mass index is 21.14 kg/m².    General: alert in no apparent distress.  Cardiovascular: regular rate and rhythm  Pulmonary: lungs : no wheezing   Gastrointestinal: BS present.   Monofilament testing with a 10 gram force: sensation intact: decreased bilaterally  Visual Inspection: Feet without maceration, ulcers, fissures.  Pedal pulses: decreased bilaterally   Sinus exam mild mucosal inflammation.  No purulent drainage noted  Oropharynx no " exudate  Assessment and Plan     1. Sinus congestion  This is an acute condition.  No overt signs of infection noted on exam today.  Recommend patient to use nasal irrigation 3 times a day.  Patient recommend to try Flonase nasal spray over-the-counter.  Follow-up if not better    2. Type 2 diabetes mellitus with hyperlipidemia (HCC)  Chronic condition.  Suboptimal control but A1c today 7.4%.  Recommend patient to see glimepiride due to my concern of possible hypoglycemia.  Patient to start Jardiance 10 mg daily.  Continue metformin 1000 Mg twice daily.  Clinical notes:   -Discussed with the patient goals for Diabetes management:   HbA1C < 7% /  Fasting BG   /  2 hrs post meal BG below 160  -Reviewed pathophysiology of diabetes and the importance of glycemic control to reduce the risk of diabetes related complications   Microvascular: retinopathy, neuropathy, nephropathy  Macrovascular: heart attack, stroke, peripheral vascular dz  -Advised pt to monitor sugar closely  -Counseled pt the importance of recognizing and treating hypoglycemia.   -The importance of increasing physical activity to improve diabetes control  discussed with the patient.   -Patient was also educated on changing diet and making better choices to help control blood sugar.       reCommend follow-up in 3 to 4 months with lab test before the next visit    - POCT Hemoglobin A1C  - Basic Metabolic Panel; Future  - Diabetic Monofilament LE Exam  - Empagliflozin (JARDIANCE) 10 MG Tab tablet; Take 1 Tablet by mouth every day.  Dispense: 100 Tablet; Refill: 3  - HEMOGLOBIN A1C; Future    3. Psoriatic arthritis (HCC)  This is a chronic condition.  Stable.  Continue methotrexate 15 Mg every 7 days and Remicade 600 Mg every 8 weeks.  Continue follow-up with rheumatologist    4. Proteinuria  Chronic condition.  Stable.  Recommend low protein diet.  Will plan to discuss with the patient regarding either low-dose lisinopril or losartan next visit  -  MICROALBUMIN CREAT RATIO URINE; Future  - PROTEIN/CREAT RATIO URINE; Future    5. REM sleep behavior disorder  Chronic stable condition.  The patient recommend to continue follow-up with sleep specialist.  Continue Klonopin at bedtime as needed      6. Dyslipidemia due to type 2 diabetes mellitus (HCC)  - ALANINE AMINO-TRANS; Future  - Lipid Profile; Future  Chronic condition.  Stable.  Continue pravastatin 20 Mg daily    7. Gastroesophageal reflux disease without esophagitis  Chronic stable condition.  Continue omeprazole 20 Mg daily    8. Migraine without aura and without status migrainosus, not intractable  Chronic stable.  The patient may take Maxalt as needed.  Currently the patient asymptomatic.      9. Anemia, unspecified type  This is a chronic condition.  Exact cause unclear.  The patient denied history of bleeding.  Hemoglobin remained stable since 2022.  No history of bleeding reported.  Recommend  - IRON; Future  - VITAMIN B12; Future  - FOLATE; Future  - FERRITIN; Future  - HEMOGLOBINOPATHY PROFILE; Future    Other orders  - clonazePAM (KLONOPIN) 0.5 MG Tab; Take 1 mg by mouth at bedtime.                Time spent:   42  minutes     Reviewed medical records including:  February 2, 2025 urgent care note  December 26, 2024 medical office note  November 13, 2024 pulmonary note  November 13, 2024 rheumatology note  November 11, 2024 orthopedics note  May 10, 2024 pulmonary note  Laboratory data dated October 20, 2024, August 29, 2024, August 12, 2024, October 10, 2024, May 14, 2024  Radiology report dated February 1, 2024, Jan 4/20/2024.    That includes face to face time and non-face to face time.  Chart review before the visit, the actual patient visit, and time spent on documentation in the EMR after the visit.  Chart review/prep, review of other providers' records, Independent review of imagings/labs ,  time for history/examination , pt's counseling/education, ordering, prescribing, treatment plan  discussed with patient, and care coordination.            Please note that this dictation was created using voice recognition software. I have made every reasonable attempt to correct obvious errors, but I expect that there are errors of grammar and possibly content that I did not discover before finalizing the note.    Raul Low MD  Internal Medicine  [facility]

## 2025-02-12 NOTE — ASSESSMENT & PLAN NOTE
Chronic condition.  Patient followed by rheumatology service.  Patient being treated with Remicade and methotrexate..  No new symptoms reported.

## 2025-02-12 NOTE — ASSESSMENT & PLAN NOTE
Chronic ongoing condition.  The patient presently taking pravastatin.  Patient tolerating medication well.

## 2025-02-12 NOTE — ASSESSMENT & PLAN NOTE
Chronic condition.  The patient was noted with normocytic anemia.  Hemoglobin slightly low at the last couple of years.  Ranging between 12 and 13's.  The patient denies a history of bleeding.  Lab test ordered for follow-up including iron study B12 folate and FOBT

## 2025-02-12 NOTE — ASSESSMENT & PLAN NOTE
This is a chronic condition.  The patient presently taking glimepiride, metformin.  Previous A1c 7%.  No significant side effects reported.  The patient denies significant hypoglycemia.

## 2025-02-12 NOTE — ASSESSMENT & PLAN NOTE
Chronic condition.  Currently taking omeprazole.  The patient denies nausea vomiting or dysphagia.

## 2025-02-17 ENCOUNTER — OUTPATIENT INFUSION SERVICES (OUTPATIENT)
Dept: ONCOLOGY | Facility: MEDICAL CENTER | Age: 80
End: 2025-02-17
Attending: STUDENT IN AN ORGANIZED HEALTH CARE EDUCATION/TRAINING PROGRAM
Payer: MEDICARE

## 2025-02-17 VITALS
WEIGHT: 132.28 LBS | OXYGEN SATURATION: 98 % | TEMPERATURE: 97.4 F | SYSTOLIC BLOOD PRESSURE: 131 MMHG | BODY MASS INDEX: 21.26 KG/M2 | RESPIRATION RATE: 18 BRPM | DIASTOLIC BLOOD PRESSURE: 70 MMHG | HEIGHT: 66 IN | HEART RATE: 70 BPM

## 2025-02-17 DIAGNOSIS — L40.50 PSORIATIC ARTHRITIS (HCC): ICD-10-CM

## 2025-02-17 DIAGNOSIS — L40.0 PLAQUE PSORIASIS: ICD-10-CM

## 2025-02-17 PROCEDURE — 700111 HCHG RX REV CODE 636 W/ 250 OVERRIDE (IP): Mod: JZ,TB | Performed by: STUDENT IN AN ORGANIZED HEALTH CARE EDUCATION/TRAINING PROGRAM

## 2025-02-17 PROCEDURE — 700105 HCHG RX REV CODE 258: Performed by: STUDENT IN AN ORGANIZED HEALTH CARE EDUCATION/TRAINING PROGRAM

## 2025-02-17 PROCEDURE — 96413 CHEMO IV INFUSION 1 HR: CPT

## 2025-02-17 RX ORDER — METHYLPREDNISOLONE SODIUM SUCCINATE 125 MG/2ML
125 INJECTION, POWDER, LYOPHILIZED, FOR SOLUTION INTRAMUSCULAR; INTRAVENOUS PRN
OUTPATIENT
Start: 2025-04-14

## 2025-02-17 RX ORDER — ACETAMINOPHEN 325 MG/1
650 TABLET ORAL ONCE
Status: DISCONTINUED | OUTPATIENT
Start: 2025-02-17 | End: 2025-02-17 | Stop reason: HOSPADM

## 2025-02-17 RX ORDER — 0.9 % SODIUM CHLORIDE 0.9 %
3 VIAL (ML) INJECTION PRN
OUTPATIENT
Start: 2025-04-14

## 2025-02-17 RX ORDER — DIPHENHYDRAMINE HYDROCHLORIDE 50 MG/ML
50 INJECTION INTRAMUSCULAR; INTRAVENOUS PRN
OUTPATIENT
Start: 2025-04-14

## 2025-02-17 RX ORDER — DIPHENHYDRAMINE HYDROCHLORIDE 50 MG/ML
25 INJECTION INTRAMUSCULAR; INTRAVENOUS ONCE
Status: DISCONTINUED | OUTPATIENT
Start: 2025-02-17 | End: 2025-02-17 | Stop reason: HOSPADM

## 2025-02-17 RX ORDER — 0.9 % SODIUM CHLORIDE 0.9 %
VIAL (ML) INJECTION PRN
OUTPATIENT
Start: 2025-04-14

## 2025-02-17 RX ORDER — SODIUM CHLORIDE 9 MG/ML
INJECTION, SOLUTION INTRAVENOUS CONTINUOUS
Status: DISCONTINUED | OUTPATIENT
Start: 2025-02-17 | End: 2025-02-17 | Stop reason: HOSPADM

## 2025-02-17 RX ORDER — ACETAMINOPHEN 325 MG/1
650 TABLET ORAL ONCE
OUTPATIENT
Start: 2025-04-14

## 2025-02-17 RX ORDER — SODIUM CHLORIDE 9 MG/ML
INJECTION, SOLUTION INTRAVENOUS CONTINUOUS
OUTPATIENT
Start: 2025-04-14

## 2025-02-17 RX ORDER — EPINEPHRINE 1 MG/ML(1)
0.5 AMPUL (ML) INJECTION PRN
OUTPATIENT
Start: 2025-04-14

## 2025-02-17 RX ORDER — 0.9 % SODIUM CHLORIDE 0.9 %
10 VIAL (ML) INJECTION PRN
OUTPATIENT
Start: 2025-04-14

## 2025-02-17 RX ADMIN — INFLIXIMAB-AXXQ 300 MG: 100 INJECTION, POWDER, LYOPHILIZED, FOR SOLUTION INTRAVENOUS at 14:43

## 2025-02-17 ASSESSMENT — FIBROSIS 4 INDEX: FIB4 SCORE: 1.77

## 2025-02-18 NOTE — PROGRESS NOTES
Phuc presents to infusion for Q8week Avsola infusion. Patient denies recent vaccination, did recently have a sinus infection for which he just finished a course of PO ABX, reports symptom improvement and is afebrile.      PIV started with positive blood return.    Patient declined pre-treatment medications.      Avsola infused over 1 hour with filter, patient tolerated well with no adverse effects.      PIV flushed post infusion with positive blood return., d/joel with tip intact. Patient left in stable condition, knows when to return for next appt.

## 2025-02-22 DIAGNOSIS — G47.52 REM SLEEP BEHAVIOR DISORDER: ICD-10-CM

## 2025-02-26 ENCOUNTER — TELEPHONE (OUTPATIENT)
Dept: SLEEP MEDICINE | Facility: MEDICAL CENTER | Age: 80
End: 2025-02-26
Payer: MEDICARE

## 2025-02-26 DIAGNOSIS — G47.52 REM SLEEP BEHAVIOR DISORDER: ICD-10-CM

## 2025-02-26 RX ORDER — CLONAZEPAM 0.5 MG/1
.5-1.5 TABLET ORAL NIGHTLY PRN
Qty: 30 TABLET | Refills: 3 | Status: SHIPPED
Start: 2025-02-26 | End: 2025-02-28

## 2025-02-26 NOTE — TELEPHONE ENCOUNTER
Have we ever prescribed this med? Yes.  If yes, what date? 10/02/24    Last OV: 11/13/24 with Dr Hernandez    Next OV: No Pending appt.     DX: REM sleep behavior disorder [G47.52]     Medications:   Requested Prescriptions     Pending Prescriptions Disp Refills    clonazePAM (KLONOPIN) 0.5 MG Tab [Pharmacy Med Name: clonazePAM Oral Tablet 0.5 MG] 90 Tablet 0     Sig: TAKE 1-3 TABLETS BY MOUTH EVERY EVENING FOR RAPID EYE MOVEMENT PERIOD OF SLEEP

## 2025-02-26 NOTE — TELEPHONE ENCOUNTER
LVM asking how many days does the 30 tablets needs to last as sprit say 180 day. Pharmacy would like a call back so they know how they would need to do the refills. clonazePAM

## 2025-02-28 RX ORDER — CLONAZEPAM 0.5 MG/1
.5-1.5 TABLET ORAL NIGHTLY
Qty: 90 TABLET | Refills: 3 | Status: SHIPPED | OUTPATIENT
Start: 2025-02-28 | End: 2025-06-28

## 2025-03-20 ENCOUNTER — OFFICE VISIT (OUTPATIENT)
Dept: URGENT CARE | Facility: PHYSICIAN GROUP | Age: 80
End: 2025-03-20
Payer: MEDICARE

## 2025-03-20 ENCOUNTER — HOSPITAL ENCOUNTER (OUTPATIENT)
Dept: RADIOLOGY | Facility: MEDICAL CENTER | Age: 80
End: 2025-03-20
Attending: PHYSICIAN ASSISTANT
Payer: MEDICARE

## 2025-03-20 VITALS
HEART RATE: 94 BPM | WEIGHT: 130 LBS | BODY MASS INDEX: 20.4 KG/M2 | SYSTOLIC BLOOD PRESSURE: 100 MMHG | TEMPERATURE: 97.5 F | OXYGEN SATURATION: 94 % | HEIGHT: 67 IN | DIASTOLIC BLOOD PRESSURE: 56 MMHG | RESPIRATION RATE: 19 BRPM

## 2025-03-20 DIAGNOSIS — R05.1 ACUTE COUGH: ICD-10-CM

## 2025-03-20 DIAGNOSIS — J18.9 PNEUMONIA OF RIGHT LUNG DUE TO INFECTIOUS ORGANISM, UNSPECIFIED PART OF LUNG: ICD-10-CM

## 2025-03-20 PROCEDURE — 71046 X-RAY EXAM CHEST 2 VIEWS: CPT

## 2025-03-20 PROCEDURE — 99214 OFFICE O/P EST MOD 30 MIN: CPT | Performed by: PHYSICIAN ASSISTANT

## 2025-03-20 PROCEDURE — 3078F DIAST BP <80 MM HG: CPT | Performed by: PHYSICIAN ASSISTANT

## 2025-03-20 PROCEDURE — 3074F SYST BP LT 130 MM HG: CPT | Performed by: PHYSICIAN ASSISTANT

## 2025-03-20 RX ORDER — DOXYCYCLINE HYCLATE 100 MG
100 TABLET ORAL 2 TIMES DAILY
Qty: 14 TABLET | Refills: 0 | Status: SHIPPED | OUTPATIENT
Start: 2025-03-20 | End: 2025-03-27

## 2025-03-20 ASSESSMENT — ENCOUNTER SYMPTOMS
COUGH: 1
CHILLS: 1
DIARRHEA: 1
HEADACHES: 1
SPUTUM PRODUCTION: 1

## 2025-03-20 ASSESSMENT — FIBROSIS 4 INDEX: FIB4 SCORE: 1.77

## 2025-03-20 NOTE — PROGRESS NOTES
"Subjective:   Phuc Mcdowell is a 79 y.o. male who presents for Influenza (Exposed to flu A), Muscle Pain, Headache, Diarrhea (X 2 weeks ), and Nasal Congestion      Wife positive for influenza A last week  Patient started feeling sick with similar symptoms around 1 week ago, with typical flu symptoms including fever, myalgias, headache, loose stools, nasal congestion and cough. Cough is mostly dry. Patient is concerned due to his age and history of immune compromise. He felt like he was improving however started to be more painful and more chest discomfort.     Review of Systems   Constitutional:  Positive for chills and malaise/fatigue.   HENT:  Positive for congestion.    Respiratory:  Positive for cough and sputum production.    Gastrointestinal:  Positive for diarrhea.   Neurological:  Positive for headaches.       Medications, Allergies, and current problem list reviewed today in Epic.     Objective:     /56   Pulse 94   Temp 36.4 °C (97.5 °F) (Temporal)   Resp 19   Ht 1.702 m (5' 7\")   Wt 59 kg (130 lb)   SpO2 94%     Physical Exam  Vitals reviewed.   Constitutional:       Appearance: Normal appearance.   HENT:      Head: Normocephalic and atraumatic.      Right Ear: Tympanic membrane, ear canal and external ear normal.      Left Ear: Tympanic membrane, ear canal and external ear normal.      Nose: Rhinorrhea present.      Mouth/Throat:      Mouth: Mucous membranes are moist.      Pharynx: Oropharynx is clear.   Eyes:      Conjunctiva/sclera: Conjunctivae normal.      Pupils: Pupils are equal, round, and reactive to light.   Cardiovascular:      Rate and Rhythm: Normal rate and regular rhythm.   Pulmonary:      Effort: Pulmonary effort is normal.      Breath sounds: Normal breath sounds.   Musculoskeletal:      Cervical back: Normal range of motion.   Lymphadenopathy:      Cervical: No cervical adenopathy.   Skin:     General: Skin is warm and dry.      Capillary Refill: Capillary refill takes " less than 2 seconds.   Neurological:      Mental Status: He is alert and oriented to person, place, and time.         RADIOLOGY RESULTS   DX-CHEST-2 VIEWS  Result Date: 3/20/2025  3/20/2025 1:19 PM HISTORY/REASON FOR EXAM:  Cough TECHNIQUE/EXAM DESCRIPTION AND NUMBER OF VIEWS: Two views of the chest. COMPARISON:  9/5/2023 FINDINGS: Cardiomediastinal contour is within normal limits. No focal pulmonary consolidation. No pleural fluid collection or pneumothorax. No major bony abnormality is seen.     No acute cardiopulmonary disease.             Assessment/Plan:     Diagnosis and associated orders:     1. Acute cough  DX-CHEST-2 VIEWS      2. Pneumonia of right lung due to infectious organism, unspecified part of lung  doxycycline (VIBRAMYCIN) 100 MG Tab         Comments/MDM:     My review of xray is concerning for right lower lobe pneumonia especially given his comorbidities and clinical history.  Trial of doxycycline  Close follow up if not showing improvement  Reviewed other supportive care         Differential diagnosis, natural history, supportive care, and indications for immediate follow-up discussed.    Advised the patient to follow-up with the primary care physician for recheck, reevaluation, and consideration of further management.    Please note that this dictation was created using voice recognition software. I have made a reasonable attempt to correct obvious errors, but I expect that there are errors of grammar and possibly content that I did not discover before finalizing the note.    This note was electronically signed by Franklyn Tobias PA-C

## 2025-03-29 DIAGNOSIS — E78.2 MIXED HYPERLIPIDEMIA: ICD-10-CM

## 2025-04-01 RX ORDER — PRAVASTATIN SODIUM 20 MG
20 TABLET ORAL EVERY EVENING
Qty: 90 TABLET | Refills: 3 | Status: SHIPPED | OUTPATIENT
Start: 2025-04-01

## 2025-04-01 RX ORDER — OMEPRAZOLE 20 MG/1
20 CAPSULE, DELAYED RELEASE ORAL DAILY
Qty: 90 CAPSULE | Refills: 3 | Status: SHIPPED | OUTPATIENT
Start: 2025-04-01

## 2025-04-02 DIAGNOSIS — E78.5 TYPE 2 DIABETES MELLITUS WITH HYPERLIPIDEMIA (HCC): ICD-10-CM

## 2025-04-02 DIAGNOSIS — E11.69 TYPE 2 DIABETES MELLITUS WITH HYPERLIPIDEMIA (HCC): ICD-10-CM

## 2025-04-02 RX ORDER — GLIMEPIRIDE 2 MG/1
2 TABLET ORAL 2 TIMES DAILY
Qty: 180 TABLET | Refills: 3 | Status: SHIPPED | OUTPATIENT
Start: 2025-04-02

## 2025-04-02 NOTE — TELEPHONE ENCOUNTER
Received request via: Pharmacy    Was the patient seen in the last year in this department? Yes    Does the patient have an active prescription (recently filled or refills available) for medication(s) requested? No    Pharmacy Name: [id number] - [place], [place] - [address]     Does the patient have skilled nursing Plus and need 100-day supply? (This applies to ALL medications) Patient does not have SCP

## 2025-04-20 ENCOUNTER — OUTPATIENT INFUSION SERVICES (OUTPATIENT)
Dept: ONCOLOGY | Facility: MEDICAL CENTER | Age: 80
End: 2025-04-20
Attending: STUDENT IN AN ORGANIZED HEALTH CARE EDUCATION/TRAINING PROGRAM
Payer: MEDICARE

## 2025-04-20 VITALS
HEART RATE: 89 BPM | RESPIRATION RATE: 18 BRPM | BODY MASS INDEX: 21.22 KG/M2 | WEIGHT: 132.06 LBS | SYSTOLIC BLOOD PRESSURE: 116 MMHG | HEIGHT: 66 IN | TEMPERATURE: 97.4 F | OXYGEN SATURATION: 98 % | DIASTOLIC BLOOD PRESSURE: 68 MMHG

## 2025-04-20 DIAGNOSIS — L40.0 PLAQUE PSORIASIS: ICD-10-CM

## 2025-04-20 DIAGNOSIS — L40.50 PSORIATIC ARTHRITIS (HCC): ICD-10-CM

## 2025-04-20 LAB
BASOPHILS # BLD AUTO: 0.6 % (ref 0–1.8)
BASOPHILS # BLD: 0.05 K/UL (ref 0–0.12)
EOSINOPHIL # BLD AUTO: 0.15 K/UL (ref 0–0.51)
EOSINOPHIL NFR BLD: 1.8 % (ref 0–6.9)
ERYTHROCYTE [DISTWIDTH] IN BLOOD BY AUTOMATED COUNT: 52.8 FL (ref 35.9–50)
HCT VFR BLD AUTO: 36.5 % (ref 42–52)
HGB BLD-MCNC: 11.9 G/DL (ref 14–18)
IMM GRANULOCYTES # BLD AUTO: 0.02 K/UL (ref 0–0.11)
IMM GRANULOCYTES NFR BLD AUTO: 0.2 % (ref 0–0.9)
LYMPHOCYTES # BLD AUTO: 2.44 K/UL (ref 1–4.8)
LYMPHOCYTES NFR BLD: 29.1 % (ref 22–41)
MCH RBC QN AUTO: 30.9 PG (ref 27–33)
MCHC RBC AUTO-ENTMCNC: 32.6 G/DL (ref 32.3–36.5)
MCV RBC AUTO: 94.8 FL (ref 81.4–97.8)
MONOCYTES # BLD AUTO: 0.72 K/UL (ref 0–0.85)
MONOCYTES NFR BLD AUTO: 8.6 % (ref 0–13.4)
NEUTROPHILS # BLD AUTO: 5 K/UL (ref 1.82–7.42)
NEUTROPHILS NFR BLD: 59.7 % (ref 44–72)
NRBC # BLD AUTO: 0 K/UL
NRBC BLD-RTO: 0 /100 WBC (ref 0–0.2)
OUTPT INFUS CBC COMMENT OICOM: ABNORMAL
PLATELET # BLD AUTO: 172 K/UL (ref 164–446)
PMV BLD AUTO: 9.7 FL (ref 9–12.9)
RBC # BLD AUTO: 3.85 M/UL (ref 4.7–6.1)
WBC # BLD AUTO: 8.4 K/UL (ref 4.8–10.8)

## 2025-04-20 PROCEDURE — 96415 CHEMO IV INFUSION ADDL HR: CPT

## 2025-04-20 PROCEDURE — 96413 CHEMO IV INFUSION 1 HR: CPT

## 2025-04-20 PROCEDURE — 700111 HCHG RX REV CODE 636 W/ 250 OVERRIDE (IP): Mod: JZ,TB | Performed by: STUDENT IN AN ORGANIZED HEALTH CARE EDUCATION/TRAINING PROGRAM

## 2025-04-20 PROCEDURE — 85025 COMPLETE CBC W/AUTO DIFF WBC: CPT

## 2025-04-20 PROCEDURE — 700105 HCHG RX REV CODE 258: Performed by: STUDENT IN AN ORGANIZED HEALTH CARE EDUCATION/TRAINING PROGRAM

## 2025-04-20 RX ORDER — 0.9 % SODIUM CHLORIDE 0.9 %
10 VIAL (ML) INJECTION PRN
OUTPATIENT
Start: 2025-06-09

## 2025-04-20 RX ORDER — DIPHENHYDRAMINE HYDROCHLORIDE 50 MG/ML
50 INJECTION, SOLUTION INTRAMUSCULAR; INTRAVENOUS PRN
OUTPATIENT
Start: 2025-06-09

## 2025-04-20 RX ORDER — ACETAMINOPHEN 325 MG/1
650 TABLET ORAL ONCE
Status: DISCONTINUED | OUTPATIENT
Start: 2025-04-20 | End: 2025-04-20 | Stop reason: HOSPADM

## 2025-04-20 RX ORDER — ACETAMINOPHEN 325 MG/1
650 TABLET ORAL ONCE
OUTPATIENT
Start: 2025-06-09

## 2025-04-20 RX ORDER — EPINEPHRINE 1 MG/ML(1)
0.5 AMPUL (ML) INJECTION PRN
OUTPATIENT
Start: 2025-06-09

## 2025-04-20 RX ORDER — 0.9 % SODIUM CHLORIDE 0.9 %
VIAL (ML) INJECTION PRN
OUTPATIENT
Start: 2025-06-09

## 2025-04-20 RX ORDER — DIPHENHYDRAMINE HYDROCHLORIDE 50 MG/ML
25 INJECTION, SOLUTION INTRAMUSCULAR; INTRAVENOUS ONCE
Status: DISCONTINUED | OUTPATIENT
Start: 2025-04-20 | End: 2025-04-20 | Stop reason: HOSPADM

## 2025-04-20 RX ORDER — 0.9 % SODIUM CHLORIDE 0.9 %
3 VIAL (ML) INJECTION PRN
OUTPATIENT
Start: 2025-06-09

## 2025-04-20 RX ORDER — METHYLPREDNISOLONE SODIUM SUCCINATE 125 MG/2ML
125 INJECTION, POWDER, LYOPHILIZED, FOR SOLUTION INTRAMUSCULAR; INTRAVENOUS PRN
OUTPATIENT
Start: 2025-06-09

## 2025-04-20 RX ORDER — SODIUM CHLORIDE 9 MG/ML
INJECTION, SOLUTION INTRAVENOUS CONTINUOUS
OUTPATIENT
Start: 2025-06-09

## 2025-04-20 RX ADMIN — INFLIXIMAB-AXXQ 300 MG: 100 INJECTION, POWDER, LYOPHILIZED, FOR SOLUTION INTRAVENOUS at 15:09

## 2025-04-20 ASSESSMENT — FIBROSIS 4 INDEX: FIB4 SCORE: 1.77

## 2025-04-20 NOTE — PROGRESS NOTES
Pt arrived ambulatory to [facility] for Q 8 week Infliximab infusion for psoriatic arthritis. POC discussed with pt and he agrees with plan.     PIV established, brisk blood return noted, Q 24 week CBC drawn as ordered. Pt declined pre-meds. Pt medicated per MAR. Pt tolerated treatment without s/s adverse reaction. PIV dc'd catheter tip intact, gauze and coban dressing applied.     Pt discharged to self care, [facility]. Pt's next appointment confirmed 6/15/78732.

## 2025-05-12 ASSESSMENT — RHEUMATOLOGY FOLLOW-UP QUESTIONNAIRE
MUSCLE PAIN: Y
NASAL CONGESTION: Y
BLURRY VISION: Y
MARK ALL THE AREAS OF PAIN: 119393277
DRY COUGH: Y
HEADACHES: Y
DRY MOUTH: Y
DURATION: <30 MINS
SINUS PAIN: Y
JOINT PAIN: BETTER WITH ACTIVITY

## 2025-05-13 ENCOUNTER — OFFICE VISIT (OUTPATIENT)
Dept: RHEUMATOLOGY | Facility: MEDICAL CENTER | Age: 80
End: 2025-05-13
Attending: STUDENT IN AN ORGANIZED HEALTH CARE EDUCATION/TRAINING PROGRAM
Payer: MEDICARE

## 2025-05-13 ENCOUNTER — TELEPHONE (OUTPATIENT)
Dept: MEDICAL GROUP | Facility: PHYSICIAN GROUP | Age: 80
End: 2025-05-13

## 2025-05-13 VITALS
SYSTOLIC BLOOD PRESSURE: 104 MMHG | HEART RATE: 88 BPM | OXYGEN SATURATION: 98 % | HEIGHT: 64 IN | BODY MASS INDEX: 22.88 KG/M2 | WEIGHT: 134 LBS | TEMPERATURE: 97.1 F | DIASTOLIC BLOOD PRESSURE: 80 MMHG

## 2025-05-13 DIAGNOSIS — M19.012 PRIMARY OSTEOARTHRITIS OF BOTH SHOULDERS: ICD-10-CM

## 2025-05-13 DIAGNOSIS — L40.50 PSORIATIC ARTHRITIS (HCC): ICD-10-CM

## 2025-05-13 DIAGNOSIS — M19.011 PRIMARY OSTEOARTHRITIS OF BOTH SHOULDERS: ICD-10-CM

## 2025-05-13 DIAGNOSIS — M47.817 DJD (DEGENERATIVE JOINT DISEASE), LUMBOSACRAL: ICD-10-CM

## 2025-05-13 DIAGNOSIS — D84.9 IMMUNOSUPPRESSED STATUS (HCC): ICD-10-CM

## 2025-05-13 DIAGNOSIS — L40.0 PLAQUE PSORIASIS: ICD-10-CM

## 2025-05-13 PROCEDURE — 99214 OFFICE O/P EST MOD 30 MIN: CPT | Performed by: STUDENT IN AN ORGANIZED HEALTH CARE EDUCATION/TRAINING PROGRAM

## 2025-05-13 PROCEDURE — 3074F SYST BP LT 130 MM HG: CPT | Performed by: STUDENT IN AN ORGANIZED HEALTH CARE EDUCATION/TRAINING PROGRAM

## 2025-05-13 PROCEDURE — 3079F DIAST BP 80-89 MM HG: CPT | Performed by: STUDENT IN AN ORGANIZED HEALTH CARE EDUCATION/TRAINING PROGRAM

## 2025-05-13 PROCEDURE — 99212 OFFICE O/P EST SF 10 MIN: CPT | Performed by: STUDENT IN AN ORGANIZED HEALTH CARE EDUCATION/TRAINING PROGRAM

## 2025-05-13 RX ORDER — METHOTREXATE 2.5 MG/1
10 TABLET ORAL
Qty: 48 TABLET | Refills: 3
Start: 2025-05-13

## 2025-05-13 ASSESSMENT — FIBROSIS 4 INDEX: FIB4 SCORE: 1.86

## 2025-05-13 NOTE — PROGRESS NOTES
[facility] RHEUMATOLOGY  [address], [address], [place] [address]  Phone: [phone] ? Fax: [phone]  [facility]/Health-Services/Rheumatology    FOLLOW-UP VISIT NOTE         Subjective     DATE OF SERVICE: 05/13/2025    PRIMARY CARE PROVIDER:  Raul Low M.D.  [address] [id number]    PATIENT IDENTIFICATION:  Phuc Mcdowell  [address] [address]    YOB: 1945    MEDICAL RECORD NUMBER: [id number]         CHIEF COMPLAINT:   Chief Complaint   Patient presents with    Follow-Up     Psoriatic Arthritis. Last seen 11/13/24       RHEUMATOLOGIC HISTORY:  Phuc Mcdowell is a 79 y.o. male with pertinent history notable for psoriatic arthritis diagnosed in 2000 preceded by plaque psoriasis diagnosed in the 1990s, osteoarthritis of shoulders, DJD/DDD of lumbosacral spine with sciatica, GERD, and T2DM among other comorbidities. Previously under the care of a rheumatologist in [facility] prior to relocating to [place], he initially presented on 4/19/22 to establish care. Reported minimal joint pain in his hands (mostly DIP joints) toward the end of infliximab infusion cycle, but no new or worsening skin plaques. Stated that his joint pain was sometimes associated with 30 minutes of morning stiffness that improved with activity. Reported some weight loss due to changes he made in his diet and physical activity, but otherwise doing well.     Pertinent treatments: Cyclosporine 100 mg daily (1990s-4/2022, deemed unnecessary), methotrexate 20>15>10 mg PO weekly with folic acid 1 mg daily (started 1990s, dose decreased to 5/13/25-present, effective), infliximab 600>300 mg IV every 8 weeks (started 2000s, dose decreased 6/2023-present, effective), meloxicam 15 mg daily PRN (11/2024-present, helpful).     Pertinent positive labs: Elevated UPCR 0.247 and UACR 0.07 (in 8/2024).     Pertinent negative labs: Negative RF, anti-CCP, anti-CarP, anti-Infliximab Antibody,  CRP, ESR, and LFTs (in 11/2023), HBV and QTB (in 5/2024), eGFR and creatinine (in 8/2024), and acceptable CBC (in 10/2024).     Pertinent imaging studies: Pelvis (XR in 11/2021) with bilateral mild SI joint arthritis with partial osseous fusion. Shoulders (XR in 11/2021) with mild osteoarthritis of bilateral AC joints and left GH joint. Left elbow (XR in 7/2022) with no evidence of arthropathy. Lumbar spine (MRI in 11/2024 at [facility]) with degenerative changes most pronounced at L4-L5 with left lateral recess stenosis concerning for impingement upon the descending left L5 intraspinal nerve root.      INTERVAL HISTORY:  Reports interval history as noted on the questionnaire below or scanned under media tab.  [facility] Established Patient History Form    5/12/2025 11:17 AM PDT - Filed by Patient   MAIN REASON FOR VISIT Follow-up   INTERVAL HISTORY OF ILLNESS   Date of worsening onset:    Preceding incident/ailment:    Describe/list your symptoms: Intermittent aches but otherwise doing well   Exacerbating factors:    Alleviating factors:    Helpful medications:    Ineffective medications:    Severity of pain (scale of 1-10):    Personal/emotional stressors:    Josiah All The Areas Of Pain    REVIEW OF SYMPTOMS    General   Fevers    Chills    Night sweats    Malaise    Fatigue    Unintentional weight loss    Musculoskeletal   Joint pain Better with activity   Morning stiffness duration <30 mins   Morning stiffness characteristic    Joint swelling    Joint instability    Tendon pain    Muscle pain Yes   Body aches    Dermatologic   Hair loss with bald spots    Hair shedding    Skin thickening    Skin plaques    Sunlight-induced skin rash    Cold-induced color changes (white, purple, red on rewarming)    Neurologic/Psychiatric   Weakness    Spasms    Tingling    Burning    Numbness    Insomnia    Anxiety    Depression    Head/Eyes   Headaches Yes   Temple pain    Dizziness    Dry eyes    Eye pain    Eye redness     Blurry vision Yes   Vision loss    Ears/Nose   Ear pain    Ringing in ears    Vertigo    Hearing loss    Nasal ulcers    Nosebleeds    Sinus pain Yes   Nasal congestion Yes   Snoring    Mouth/Throat   Oral ulcers    Bleeding gums    Dry mouth Yes   Cavities    Sore throat    Sticking in throat    Difficulty speaking    Neck/Lymphatics   Thyroid pain    Thyroid swelling    Lymph node swelling    Cardiac/Respiratory   Chest pain with breathing    Dry cough Yes   Cough with bloody phlegm    Shortness of breath    Fast heartbeats    Irregular heartbeats    Gastrointestinal   Nausea    Vomiting    Difficulty swallowing    Heartburn    Abdominal pain    Bloody stool    Mucus stool    Genitourinary   Pelvic pain    Genital ulcers    Abnormal discharge    Burning urination    Frothy urine    Blood in urine        REVIEW OF SYSTEMS:  Except as noted in the history above, relevant review of systems with emphasis on autoimmune rheumatic diseases was otherwise negative.      CURRENT PROBLEM LIST:  Patient Active Problem List    Diagnosis Date Noted    Sinus congestion 02/12/2025    DJD (degenerative joint disease), lumbosacral 11/13/2024    Allergies 09/16/2024    Migraine 09/16/2024    Anemia 09/16/2024    Proteinuria 08/10/2024    Acute left-sided low back pain without sciatica 02/01/2024    Need for vaccination 09/05/2023    Dyspnea on exertion 09/05/2023    Gastroesophageal reflux disease without esophagitis 05/02/2023    Primary osteoarthritis of both shoulders 04/25/2023    Chronic pain of left elbow 07/26/2022    Plaque psoriasis 04/19/2022    Immunosuppressed status (HCC) 04/17/2022    Dyslipidemia due to type 2 diabetes mellitus (formerly [facility]) 04/12/2022    REM sleep behavior disorder 04/11/2022    Psoriatic arthritis (formerly [facility]) 04/11/2022    Type 2 diabetes mellitus with hyperlipidemia (formerly [facility]) 04/11/2022       PAST MEDICAL HISTORY:  Past Medical History:   Diagnosis Date    Back pain     Back pain     Chickenpox     Diabetes (formerly [facility])      Double vision     Frequent urination     Heartburn     Hyperlipidemia     Influenza     Mumps     Nasal drainage     Painful joint     Psoriatic arthritis (HCC)     Rash     REM sleep behavior disorder     Rheumatoid arthritis (HCC)     Sleep apnea     Sore muscles     Tonsillitis     Wears glasses        PAST SURGICAL HISTORY:  Past Surgical History:   Procedure Laterality Date    CATARACT EXTRACTION WITH IOL Bilateral 2016    [place] REMV 2ND CATARACT,CORN-SCLER SECTN         SOCIAL HISTORY:  Social History     Socioeconomic History    Marital status:      Spouse name: Not on file    Number of children: Not on file    Years of education: Not on file    Highest education level: Bachelor's degree (e.g., BA, AB, BS)   Occupational History    Not on file   Tobacco Use    Smoking status: Former    Smokeless tobacco: Never    Tobacco comments:     quit in 1994   Vaping Use    Vaping status: Never Used   Substance and Sexual Activity    Alcohol use: Yes     Alcohol/week: 1.2 oz     Types: 2 Glasses of wine per week     Comment: occasional glass of wine    Drug use: Never    Sexual activity: Yes     Partners: Female     Birth control/protection: None   Other Topics Concern    Not on file   Social History Narrative    Not on file     Social Drivers of Health     Financial Resource Strain: Low Risk  (11/1/2022)    Overall Financial Resource Strain (CARDIA)     Difficulty of Paying Living Expenses: Not very hard   Food Insecurity: No Food Insecurity (11/1/2022)    Hunger Vital Sign     Worried About Running Out of Food in the Last Year: Never true     Ran Out of Food in the Last Year: Never true   Transportation Needs: No Transportation Needs (11/1/2022)    PRAPARE - Transportation     Lack of Transportation (Medical): No     Lack of Transportation (Non-Medical): No   Physical Activity: Sufficiently Active (11/1/2022)    Exercise Vital Sign     Days of Exercise per Week: 3 days     Minutes of Exercise per Session: 60  min   Stress: No Stress Concern Present (2022)    [employer] - Occupational Stress Questionnaire     Feeling of Stress : Not at all   Social Connections: Not on file   Intimate Partner Violence: Not on file   Housing Stability: Low Risk  (2022)    Housing Stability Vital Sign     Unable to Pay for Housing in the Last Year: No     Number of Places Lived in the Last Year: 2     Unstable Housing in the Last Year: No       FAMILY HISTORY:  Family History   Problem Relation Age of Onset    Cancer Mother         lung and brain -  at 88    Heart Disease Father         passed at 62    Hypertension Sister     Hypertension Brother     Cancer Brother        MEDICATIONS:  Current Outpatient Medications   Medication Sig    methotrexate 2.5 MG tablet Take 4 Tablets by mouth every 7 days.    glimepiride (AMARYL) 2 MG Tab TAKE ONE TABLET BY MOUTH TWO TIMES A DAY.    pravastatin (PRAVACHOL) 20 MG Tab TAKE ONE TABLET BY MOUTH EVERY EVENING    omeprazole (PRILOSEC) 20 MG delayed-release capsule TAKE ONE CAPSULE BY MOUTH ONCE DAILY    clonazePAM (KLONOPIN) 0.5 MG Tab Take 1-3 Tablets by mouth every evening for 120 days. Indications: Disorder of Rapid Eye Movement Period of Sleep    Aspirin 81 MG Cap 81 mg.    metformin (GLUCOPHAGE) 1000 MG tablet Take 1 Tablet by mouth 2 times a day with meals.    folic acid (FOLVITE) 1 MG Tab Take 1 Tablet by mouth every day. Except the day of methotrexate.    meloxicam (MOBIC) 15 MG tablet Take one tablet by mouth with food daily. No advil/aleve/motrin/ibuprofen on same day.    donepezil (ARICEPT) 5 MG Tab 10 mg.    ipratropium (ATROVENT) 0.03 % Solution     rizatriptan (MAXALT-MLT) 10 MG disintegrating tablet Indications: Migraine Headache    sildenafil citrate (VIAGRA) 25 MG Tab Take 25 mg by mouth 1 time a day as needed for Erectile Dysfunction.    Loratadine (KLS ALLERCLEAR PO)     inFLIXimab (REMICADE) 100 MG Recon Soln Infuse 600 mg into a venous  "catheter every 8 weeks.    Melatonin 10 MG Cap Take 20 mg by mouth every evening.    Acetaminophen (TYLENOL ARTHRITIS PAIN PO) Take 1,250 mg by mouth every day.       ALLERGIES:   No Known Allergies    IMMUNIZATIONS:  Immunization History   Administered Date(s) Administered    Covid-19 Mrna (Spikevax) Moderna 12+ Years 10/12/2023, 10/04/2024    Influenza Vaccine Adult HD 10/04/2022    Influenza Vaccine Quad Inj (Preserved) 10/04/2020    Influenza Vaccine, Quadrivalent, Adjuvanted (Pf) 09/07/2020, 10/12/2023    Influenza high-dose trivalent (PF) 10/25/2016, 10/21/2019    Influenza, unspecified formulation 10/19/2018, 09/24/2024    MODERNA SARS-COV-2 VACCINE (12+) 01/30/2021, 03/03/2021, 08/20/2021, 10/04/2022    Pneumococcal Conjugate Vaccine (PCV20) 11/03/2023    Pneumococcal Conjugate Vaccine (Prevnar/PCV-13) 01/01/2014    Pneumococcal polysaccharide vaccine (PPSV-23) 02/27/2014    RSV ABRYSVO VACCINE 10/04/2024    Zoster Vaccine Recombinant (RZV) (SHINGRIX) 09/07/2020, 12/18/2020            Objective     Vital Signs: /80 (BP Location: Left arm, Patient Position: Sitting, BP Cuff Size: Adult)   Pulse 88   Temp 36.2 °C (97.1 °F) (Temporal)   Ht 1.626 m (5' 4\")   Wt 60.8 kg (134 lb)   SpO2 98% Body mass index is 23 kg/m².    General: Appears well and comfortable  Eyes: No scleral or conjunctival lesions  ENT: No apparent oral, nasal, or ear lesions  Head/Neck: No apparent scalp or neck lesions  Cardiovascular: Regular rate and rhythm  Respiratory: Breathing quiet and unlabored  Gastrointestinal: No apparent organomegaly or abdominal masses  Integumentary: No significant cutaneous lesions or dyspigmentation  Musculoskeletal: No significant joint tenderness, swelling, warmth, erythema, or overt synovitis; no significant restriction in range of motion of joints examined  Neurologic: No focal sensory or motor deficits  Psychiatric: Mood and affect appropriate      LABORATORY RESULTS REVIEWED AND " INTERPRETED:  Lab Results   Component Value Date/Time    CREACTPROT 0.50 11/02/2023 01:37 PM    SEDRATEWES 17 11/02/2023 01:37 PM     Lab Results   Component Value Date/Time    RHEUMFACTN <10 11/02/2023 01:37 PM     Lab Results   Component Value Date/Time    TSHULTRASEN 2.180 08/10/2024 08:27 AM     Lab Results   Component Value Date/Time    WBC 8.4 04/20/2025 02:40 PM    RBC 3.85 (L) 04/20/2025 02:40 PM    HEMOGLOBIN 11.9 (L) 04/20/2025 02:40 PM    HEMATOCRIT 36.5 (L) 04/20/2025 02:40 PM    MCV 94.8 04/20/2025 02:40 PM    MCH 30.9 04/20/2025 02:40 PM    MCHC 32.6 04/20/2025 02:40 PM    RDW 52.8 (H) 04/20/2025 02:40 PM    PLATELETCT 172 04/20/2025 02:40 PM    MPV 9.7 04/20/2025 02:40 PM    NEUTS 5.00 04/20/2025 02:40 PM    LYMPHOCYTES 29.10 04/20/2025 02:40 PM    MONOCYTES 8.60 04/20/2025 02:40 PM    EOSINOPHILS 1.80 04/20/2025 02:40 PM    BASOPHILS 0.60 04/20/2025 02:40 PM     Lab Results   Component Value Date/Time    ASTSGOT 19 11/02/2023 01:37 PM    ALTSGPT 21 08/10/2024 08:27 AM    ALKPHOSPHAT 62 11/02/2023 01:37 PM    TBILIRUBIN 0.3 11/02/2023 01:37 PM    TOTPROTEIN 7.4 11/02/2023 01:37 PM    ALBUMIN 4.2 11/02/2023 01:37 PM     Lab Results   Component Value Date/Time    SODIUM 138 08/10/2024 08:27 AM    POTASSIUM 4.8 08/10/2024 08:27 AM    CHLORIDE 99 08/10/2024 08:27 AM    CO2 24 08/10/2024 08:27 AM    GLUCOSE 195 (H) 08/10/2024 08:27 AM    BUN 22 08/10/2024 08:27 AM    CREATININE 1.20 08/10/2024 08:27 AM    CALCIUM 10.3 08/10/2024 08:27 AM     Lab Results   Component Value Date/Time    TOTPROTUR 14.0 08/12/2024 11:03 AM    MICROALBUR 6.2 08/10/2024 08:27 AM    CREATININEU 56.79 08/12/2024 11:03 AM    MALBCRT 70 (H) 08/10/2024 08:27 AM     Lab Results   Component Value Date/Time    HEPBSAG Non-Reactive 05/14/2024 11:35 AM    HEPBCORTOT NonReactive 05/14/2024 11:35 AM     Lab Results   Component Value Date/Time    CHOLSTRLTOT 152 08/10/2024 08:27 AM    LDL 78 08/10/2024 08:27 AM    HDL 44 08/10/2024 08:27 AM     TRIGLYCERIDE 149 08/10/2024 08:27 AM    HBA1C 7.4 (A) 02/12/2025 10:35 AM       RADIOLOGY RESULTS REVIEWED AND INTERPRETED:    Results for orders placed in visit on 04/12/22    DX-SHOULDER 2+    Results for orders placed in visit on 04/12/22    DX-PELVIS-1 OR 2 VIEWS      All relevant laboratory and imaging results reported on this note were reviewed and interpreted by me.         Assessment & Plan     Phuc Mcdowell is a 79 y.o. male with history and physical as noted above whose presentation merits the following clinical impressions and recommendations:    1. Psoriatic arthritis (HCC)  Clinically and serologically quiescent in remission with no significant evidence of disease activity on the current regimen, so reasonable to de-escalate his immunosuppression by decreasing the dose of methotrexate as noted below.  - CRP and ESR (previously ordered, so reprinted)  - Decrease methotrexate 15 mg to 10 mg PO weekly with folic acid 1 mg daily  - Continue infliximab 300 mg IV infusion every 8 weeks  - Consider extending infliximab infusion to every 12 weeks    2. Plaque psoriasis  Clinically and serologically quiescent in remission with no significant evidence of disease activity on the current regimen, so reasonable to de-escalate his immunosuppression by decreasing the dose of methotrexate as noted below.  - Decrease methotrexate 15 mg to 10 mg PO weekly with folic acid 1 mg daily  - Continue infliximab 300 mg IV infusion every 8 weeks  - Consider extending infliximab infusion to every 12 weeks    3. DJD (degenerative joint disease), lumbosacral  Significant etiology of biomechanical lumbar spinal arthralgia which can be managed supportively.  - Lidocaine 4% Patch, Tylenol Arthritis, and oral NSAIDs as needed  - Consider referral to pain management if that becomes necessary    4. Primary osteoarthritis of both shoulders  Significant etiology of biomechanical shoulder arthralgia which can be managed  supportively.  - Tylenol Arthritis and Voltaren 1% gel with or without oral NSAIDs as needed  - Consider intra-articular steroid injection if that becomes necessary    5. Immunosuppressed status (HCC)  High risk immunosuppressant use requiring routine monitoring labs prior to every visit to assess for possible side effects including myelotoxicity, hepatotoxicity, and nephrotoxicity. Presently with no history, physical, or laboratory evidence to suggest significant adverse drug effects or opportunistic infections.  - CBC and CMP (previously ordered, so reprinted)  - Need to ascertain age-appropriate vaccines in addition to the ones listed above under immunizations      The above assessment and plan were discussed with the patient who acknowledged understanding of the plan.    FOLLOW-UP: Return in about 6 months (around 11/13/2025) for Short.         Thank you for the opportunity to participate in the care of Phuc Mcdowell.    Piero Lewis MD, MS, FACR  Rheumatologist, [facility], [facility]   of Clinical Medicine, Department of Internal Medicine  [facility]

## 2025-05-13 NOTE — PATIENT INSTRUCTIONS
[facility]    Below are important guidelines to help you navigate your health care needs and assist us in caring for you safely and effectively. We encourage you to carefully read and understand this information and adhere to them accordingly.    [employer] Messaging and Phone Calls:  Diagnosis and Treatment - For a detailed explanation of your condition and treatment plan from today's visit, refer to the visit note on [employer] via the following steps:  Log in to [employer] and click on “Visits” at the top.  Scroll down to “Past Visits” under Appointments.  Click on “View Notes” under the appropriate visit date.  Questions or Concerns - MyChart messaging is for non-urgent matters that do not require immediate attention and should be brief with no more than two questions or concerns. If you have multiple questions or concerns, we ask that you schedule an appointment to have them properly addressed.  Response to Messages - [employer] messages are addressed throughout the week depending on clinical availability, so we ask that you allow up to one week for a response.  Phone Calls and Voicemails - Phone calls and voicemail messages are reserved for time-sensitive matters that cannot wait to be addressed via [employer]. We ask that you refrain from calling the office multiple times or leaving multiple voicemails regarding the same issue as doing so may lead to delays in response time.  Urgent Issues - For urgent medical matters or medical emergencies that cannot wait, you are advised to go to your nearest Urgent Care or Emergency Department for immediate attention.    Laboratory Tests and Imaging Studies:  Future Lab and Imaging Orders - We ask that you get your lab tests and imaging studies done no later than one week before your follow-up visit unless instructed otherwise.  Results Communication - You may see some test results marked as “abnormal” that are not necessarily significant or concerning. If  there are significant abnormalities on your test results that warrant further action, you will be notified via MyChart or phone call, otherwise they will be addressed at your follow-up visit.    Prescriptions and Refill Requests:  General Prescriptions (e.g. prednisone, hydroxychloroquine, leflunomide, methotrexate, etc.) - These are sent to Retail Pharmacies, so all refill requests of these medications should be directed to your local pharmacy.  Specialty Prescriptions (e.g. Enbrel, Humira, Cosentyx, Xeljanz, etc.) - These are sent to Specialty Pharmacies, so all refill requests of these medications should be directed to your designated specialty pharmacy.  Infusion Prescriptions (e.g. Remicade, Simponi Aria, Rituxan, Saphnelo, etc.) - These are sent to Outpatient Infusion Centers, so all scheduling requests of these medications should be directed to your local infusion center.    Medication Risks and Adverse Effects:  Immunosuppressed Status - Steroids and antirheumatic drugs are immunosuppressants, so they increase the risk of infections and can have side effects on various organ systems in your body, though most of them are uncommon.  Potential Side Effects - Be sure to read the drug package inserts to learn about the potential side effects of your medications before you start taking them and take them exactly as prescribed unless instructed otherwise.  In Case of Side Effects - If you experience any significant side effects, stop taking the medication immediately and promptly notify the prescriber. Depending on the severity of the side effects, consider going to an Urgent Care or Emergency Department for immediate attention.    Immunizations and Health Screening:  Vaccinations - If you are on immunosuppressive therapy, it is important that you are up to date on age-appropriate immunizations, particularly shingles and pneumonia vaccines, which you can request from your primary care provider or from us at your  next appointment.  Screening Tests - It is also important that you are up to date on age-appropriate screening tests, such as pap smear, mammography, and colonoscopy, which you can request from your primary care provider.    Educational and Supportive Resources:  [facility] ([email or web address].org/Health-Services/[email or web address]) - Visit our website to learn more about your condition and other rheumatic diseases, and gain access to many helpful resources for them.  Disposal of Old Medications (www.nate.gov/everyday-takeback-day) - Visit the Drug Enforcement Administration website to find a nearby location where you can properly dispose of old medications you no longer need.  Disposal of Used Nora (www.safeneedledisposal.org) - Visit the Safe Needle Disposal Organization website to find a nearby location where you can properly dispose of used needles from your injectable medications.

## 2025-05-14 NOTE — TELEPHONE ENCOUNTER
VOICEMAIL  1. Caller Name: Phuc Mcdowell                        Call Back Number: [phone]    2. Message: pt is asking for a referral to Gi so he can get a colonoscopy sometime in July or August.    3. Patient approves office to leave a detailed voicemail/MyChart message: N\A

## 2025-05-18 DIAGNOSIS — Z12.11 COLON CANCER SCREENING: Primary | ICD-10-CM

## 2025-05-23 NOTE — Clinical Note
REFERRAL APPROVAL NOTICE         Sent on May 23, 2025                   Phuc Mcdowell  [address]  [facility] [address]                   Dear Mr. Mcdowell,    After a careful review of the medical information and benefit coverage, [facility] has processed your referral. See below for additional details.    If applicable, you must be actively enrolled with your insurance for coverage of the authorized service. If you have any questions regarding your coverage, please contact your insurance directly.    REFERRAL INFORMATION   Referral #:  [id number]  Referred-To Provider    Referred-By Provider:  Gastroenterology    Raul Low M.D.   [facility]      [address] [place] [address] [facility] [id number] [phone] [address] [facility] [address]  [phone]    Referral Start Date:  05/18/2025  Referral End Date:   05/18/2026             SCHEDULING  If you do not already have an appointment, please call [phone] to make an appointment.     MORE INFORMATION  If you do not already have a [employer] account, sign up at: [email or web address]  You can access your medical information, make appointments, see lab results, billing information, and more.  If you have questions regarding this referral, please contact  the [facility] Referrals department at:             [phone]. Monday - Friday 8:00AM - 5:00PM.     Sincerely,    [facility]

## 2025-05-28 ENCOUNTER — HOSPITAL ENCOUNTER (OUTPATIENT)
Dept: LAB | Facility: MEDICAL CENTER | Age: 80
End: 2025-05-28
Attending: INTERNAL MEDICINE
Payer: MEDICARE

## 2025-05-28 ENCOUNTER — HOSPITAL ENCOUNTER (OUTPATIENT)
Dept: LAB | Facility: MEDICAL CENTER | Age: 80
End: 2025-05-28
Attending: STUDENT IN AN ORGANIZED HEALTH CARE EDUCATION/TRAINING PROGRAM
Payer: MEDICARE

## 2025-05-28 DIAGNOSIS — D84.9 IMMUNOSUPPRESSED STATUS (HCC): ICD-10-CM

## 2025-05-28 DIAGNOSIS — D64.9 ANEMIA, UNSPECIFIED TYPE: Chronic | ICD-10-CM

## 2025-05-28 DIAGNOSIS — E11.69 TYPE 2 DIABETES MELLITUS WITH HYPERLIPIDEMIA (HCC): Chronic | ICD-10-CM

## 2025-05-28 DIAGNOSIS — Z11.59 NEED FOR HEPATITIS C SCREENING TEST: ICD-10-CM

## 2025-05-28 DIAGNOSIS — E78.5 TYPE 2 DIABETES MELLITUS WITH HYPERLIPIDEMIA (HCC): Chronic | ICD-10-CM

## 2025-05-28 DIAGNOSIS — E11.69 DYSLIPIDEMIA DUE TO TYPE 2 DIABETES MELLITUS (HCC): Chronic | ICD-10-CM

## 2025-05-28 DIAGNOSIS — E78.5 DYSLIPIDEMIA DUE TO TYPE 2 DIABETES MELLITUS (HCC): Chronic | ICD-10-CM

## 2025-05-28 DIAGNOSIS — L40.50 PSORIATIC ARTHRITIS (HCC): Chronic | ICD-10-CM

## 2025-05-28 DIAGNOSIS — R80.0 ISOLATED PROTEINURIA WITHOUT SPECIFIC MORPHOLOGIC LESION: ICD-10-CM

## 2025-05-28 LAB
ALBUMIN SERPL BCP-MCNC: 4.5 G/DL (ref 3.2–4.9)
ALBUMIN/GLOB SERPL: 1.2 G/DL
ALP SERPL-CCNC: 72 U/L (ref 30–99)
ALT SERPL-CCNC: 25 U/L (ref 2–50)
ALT SERPL-CCNC: 26 U/L (ref 2–50)
ANION GAP SERPL CALC-SCNC: 11 MMOL/L (ref 7–16)
ANION GAP SERPL CALC-SCNC: 11 MMOL/L (ref 7–16)
AST SERPL-CCNC: 24 U/L (ref 12–45)
BASOPHILS # BLD AUTO: 0.3 % (ref 0–1.8)
BASOPHILS # BLD: 0.02 K/UL (ref 0–0.12)
BILIRUB SERPL-MCNC: 0.4 MG/DL (ref 0.1–1.5)
BUN SERPL-MCNC: 21 MG/DL (ref 8–22)
BUN SERPL-MCNC: 21 MG/DL (ref 8–22)
CALCIUM ALBUM COR SERPL-MCNC: 9.9 MG/DL (ref 8.5–10.5)
CALCIUM SERPL-MCNC: 10.3 MG/DL (ref 8.5–10.5)
CALCIUM SERPL-MCNC: 10.4 MG/DL (ref 8.5–10.5)
CHLORIDE SERPL-SCNC: 103 MMOL/L (ref 96–112)
CHLORIDE SERPL-SCNC: 104 MMOL/L (ref 96–112)
CHOLEST SERPL-MCNC: 159 MG/DL (ref 100–199)
CO2 SERPL-SCNC: 24 MMOL/L (ref 20–33)
CO2 SERPL-SCNC: 24 MMOL/L (ref 20–33)
CREAT SERPL-MCNC: 1.23 MG/DL (ref 0.5–1.4)
CREAT SERPL-MCNC: 1.24 MG/DL (ref 0.5–1.4)
CREAT UR-MCNC: 41.9 MG/DL
CREAT UR-MCNC: 45 MG/DL
CRP SERPL HS-MCNC: 0.82 MG/DL (ref 0–0.75)
EOSINOPHIL # BLD AUTO: 0.19 K/UL (ref 0–0.51)
EOSINOPHIL NFR BLD: 2.6 % (ref 0–6.9)
ERYTHROCYTE [DISTWIDTH] IN BLOOD BY AUTOMATED COUNT: 52.9 FL (ref 35.9–50)
ERYTHROCYTE [SEDIMENTATION RATE] IN BLOOD BY WESTERGREN METHOD: 17 MM/HOUR (ref 0–20)
EST. AVERAGE GLUCOSE BLD GHB EST-MCNC: 154 MG/DL
FASTING STATUS PATIENT QL REPORTED: NORMAL
FASTING STATUS PATIENT QL REPORTED: NORMAL
FERRITIN SERPL-MCNC: 94.4 NG/ML (ref 22–322)
FOLATE SERPL-MCNC: 13.8 NG/ML
GFR SERPLBLD CREATININE-BSD FMLA CKD-EPI: 60 ML/MIN/1.73 M 2
GFR SERPLBLD CREATININE-BSD FMLA CKD-EPI: 60 ML/MIN/1.73 M 2
GLOBULIN SER CALC-MCNC: 3.7 G/DL (ref 1.9–3.5)
GLUCOSE SERPL-MCNC: 170 MG/DL (ref 65–99)
GLUCOSE SERPL-MCNC: 172 MG/DL (ref 65–99)
HBA1C MFR BLD: 7 % (ref 4–5.6)
HCT VFR BLD AUTO: 42.7 % (ref 42–52)
HCV AB SER QL: NORMAL
HDLC SERPL-MCNC: 60 MG/DL
HGB BLD-MCNC: 13.8 G/DL (ref 14–18)
IMM GRANULOCYTES # BLD AUTO: 0.02 K/UL (ref 0–0.11)
IMM GRANULOCYTES NFR BLD AUTO: 0.3 % (ref 0–0.9)
IRON SERPL-MCNC: 57 UG/DL (ref 50–180)
LDLC SERPL CALC-MCNC: 79 MG/DL
LYMPHOCYTES # BLD AUTO: 2.34 K/UL (ref 1–4.8)
LYMPHOCYTES NFR BLD: 32.2 % (ref 22–41)
MCH RBC QN AUTO: 31.4 PG (ref 27–33)
MCHC RBC AUTO-ENTMCNC: 32.3 G/DL (ref 32.3–36.5)
MCV RBC AUTO: 97.3 FL (ref 81.4–97.8)
MICROALBUMIN UR-MCNC: 3 MG/DL
MICROALBUMIN/CREAT UR: 67 MG/G (ref 0–30)
MONOCYTES # BLD AUTO: 0.65 K/UL (ref 0–0.85)
MONOCYTES NFR BLD AUTO: 8.9 % (ref 0–13.4)
NEUTROPHILS # BLD AUTO: 4.05 K/UL (ref 1.82–7.42)
NEUTROPHILS NFR BLD: 55.7 % (ref 44–72)
NRBC # BLD AUTO: 0 K/UL
NRBC BLD-RTO: 0 /100 WBC (ref 0–0.2)
PLATELET # BLD AUTO: 195 K/UL (ref 164–446)
PMV BLD AUTO: 10.2 FL (ref 9–12.9)
POTASSIUM SERPL-SCNC: 5.3 MMOL/L (ref 3.6–5.5)
POTASSIUM SERPL-SCNC: 5.6 MMOL/L (ref 3.6–5.5)
PROT SERPL-MCNC: 8.2 G/DL (ref 6–8.2)
PROT UR-MCNC: 9.7 MG/DL (ref 0–15)
PROT/CREAT UR: 232 MG/G (ref 15–68)
RBC # BLD AUTO: 4.39 M/UL (ref 4.7–6.1)
SODIUM SERPL-SCNC: 138 MMOL/L (ref 135–145)
SODIUM SERPL-SCNC: 139 MMOL/L (ref 135–145)
TRIGL SERPL-MCNC: 102 MG/DL (ref 0–149)
VIT B12 SERPL-MCNC: 1420 PG/ML (ref 211–911)
WBC # BLD AUTO: 7.3 K/UL (ref 4.8–10.8)

## 2025-05-28 PROCEDURE — 86140 C-REACTIVE PROTEIN: CPT

## 2025-05-28 PROCEDURE — 82043 UR ALBUMIN QUANTITATIVE: CPT

## 2025-05-28 PROCEDURE — 85652 RBC SED RATE AUTOMATED: CPT

## 2025-05-28 PROCEDURE — 83036 HEMOGLOBIN GLYCOSYLATED A1C: CPT | Mod: GA

## 2025-05-28 PROCEDURE — 82746 ASSAY OF FOLIC ACID SERUM: CPT

## 2025-05-28 PROCEDURE — 36415 COLL VENOUS BLD VENIPUNCTURE: CPT

## 2025-05-28 PROCEDURE — 80053 COMPREHEN METABOLIC PANEL: CPT

## 2025-05-28 PROCEDURE — 83021 HEMOGLOBIN CHROMOTOGRAPHY: CPT

## 2025-05-28 PROCEDURE — 86803 HEPATITIS C AB TEST: CPT

## 2025-05-28 PROCEDURE — 85025 COMPLETE CBC W/AUTO DIFF WBC: CPT

## 2025-05-28 PROCEDURE — 80048 BASIC METABOLIC PNL TOTAL CA: CPT

## 2025-05-28 PROCEDURE — 82728 ASSAY OF FERRITIN: CPT

## 2025-05-28 PROCEDURE — 83540 ASSAY OF IRON: CPT

## 2025-05-28 PROCEDURE — 82570 ASSAY OF URINE CREATININE: CPT | Mod: 91

## 2025-05-28 PROCEDURE — 80061 LIPID PANEL: CPT

## 2025-05-28 PROCEDURE — 84156 ASSAY OF PROTEIN URINE: CPT

## 2025-05-28 PROCEDURE — 82607 VITAMIN B-12: CPT

## 2025-05-28 PROCEDURE — 84460 ALANINE AMINO (ALT) (SGPT): CPT

## 2025-05-29 ENCOUNTER — RESULTS FOLLOW-UP (OUTPATIENT)
Dept: MEDICAL GROUP | Facility: PHYSICIAN GROUP | Age: 80
End: 2025-05-29

## 2025-05-29 DIAGNOSIS — R79.89 HIGH SERUM VITAMIN B12: Primary | ICD-10-CM

## 2025-05-29 DIAGNOSIS — R80.0 ISOLATED PROTEINURIA WITHOUT SPECIFIC MORPHOLOGIC LESION: ICD-10-CM

## 2025-05-29 DIAGNOSIS — D64.9 ANEMIA, UNSPECIFIED TYPE: Chronic | ICD-10-CM

## 2025-06-02 DIAGNOSIS — L40.0 PLAQUE PSORIASIS: ICD-10-CM

## 2025-06-02 DIAGNOSIS — L40.50 PSORIATIC ARTHRITIS (HCC): ICD-10-CM

## 2025-06-02 NOTE — TELEPHONE ENCOUNTER
Received request via: Pharmacy    Was the patient seen in the last year in this department? Yes    Does the patient have an active prescription (recently filled or refills available) for medication(s) requested? Yes. Please refuse.

## 2025-06-03 ENCOUNTER — TELEPHONE (OUTPATIENT)
Dept: RHEUMATOLOGY | Facility: MEDICAL CENTER | Age: 80
End: 2025-06-03
Payer: MEDICARE

## 2025-06-03 NOTE — TELEPHONE ENCOUNTER
Patient called stating that at his last appointment you and the pt discussed him stopping his IV infusions and also lowering the amount of tablets for methotrexate from 6 tablets to 4. He says he spoke to the IV infusion and they are going to slightly continue the IV fusions on a different schedule in the meantime and would like for his methotrexate Rx to return back to the 6 tablets for now. Please advise.

## 2025-06-06 RX ORDER — METHOTREXATE 2.5 MG/1
10 TABLET ORAL
Qty: 48 TABLET | Refills: 3 | Status: SHIPPED | OUTPATIENT
Start: 2025-06-06

## 2025-06-07 NOTE — TELEPHONE ENCOUNTER
There is no need for that. He should continue on the lower dose of methotrexate and let me know if he experiences any worsening of his joint pain or psoriasis and I will address it.    VICKY

## 2025-06-11 RX ORDER — PRAVASTATIN SODIUM 20 MG
20 TABLET ORAL
COMMUNITY
End: 2025-06-12

## 2025-06-11 RX ORDER — DONEPEZIL HYDROCHLORIDE 10 MG/1
TABLET, FILM COATED ORAL
COMMUNITY
Start: 2025-04-24 | End: 2025-06-12

## 2025-06-11 RX ORDER — SILDENAFIL 25 MG/1
25 TABLET, FILM COATED ORAL
COMMUNITY

## 2025-06-12 ENCOUNTER — OFFICE VISIT (OUTPATIENT)
Dept: MEDICAL GROUP | Facility: PHYSICIAN GROUP | Age: 80
End: 2025-06-12
Payer: MEDICARE

## 2025-06-12 VITALS
TEMPERATURE: 97.9 F | SYSTOLIC BLOOD PRESSURE: 124 MMHG | HEART RATE: 82 BPM | HEIGHT: 64 IN | BODY MASS INDEX: 23.42 KG/M2 | DIASTOLIC BLOOD PRESSURE: 68 MMHG | RESPIRATION RATE: 16 BRPM | OXYGEN SATURATION: 97 % | WEIGHT: 137.2 LBS

## 2025-06-12 DIAGNOSIS — E78.5 DYSLIPIDEMIA DUE TO TYPE 2 DIABETES MELLITUS (HCC): Chronic | ICD-10-CM

## 2025-06-12 DIAGNOSIS — E11.69 TYPE 2 DIABETES MELLITUS WITH HYPERLIPIDEMIA (HCC): Chronic | ICD-10-CM

## 2025-06-12 DIAGNOSIS — L40.50 PSORIATIC ARTHRITIS (HCC): Primary | Chronic | ICD-10-CM

## 2025-06-12 DIAGNOSIS — K21.9 GASTROESOPHAGEAL REFLUX DISEASE WITHOUT ESOPHAGITIS: Chronic | ICD-10-CM

## 2025-06-12 DIAGNOSIS — G43.009 MIGRAINE WITHOUT AURA AND WITHOUT STATUS MIGRAINOSUS, NOT INTRACTABLE: Chronic | ICD-10-CM

## 2025-06-12 DIAGNOSIS — G47.52 REM SLEEP BEHAVIOR DISORDER: Chronic | ICD-10-CM

## 2025-06-12 DIAGNOSIS — R05.3 CHRONIC COUGH: ICD-10-CM

## 2025-06-12 DIAGNOSIS — R79.89 HIGH SERUM VITAMIN B12: ICD-10-CM

## 2025-06-12 DIAGNOSIS — D84.9 IMMUNOSUPPRESSED STATUS (HCC): ICD-10-CM

## 2025-06-12 DIAGNOSIS — E78.5 TYPE 2 DIABETES MELLITUS WITH HYPERLIPIDEMIA (HCC): Chronic | ICD-10-CM

## 2025-06-12 DIAGNOSIS — E11.69 DYSLIPIDEMIA DUE TO TYPE 2 DIABETES MELLITUS (HCC): Chronic | ICD-10-CM

## 2025-06-12 DIAGNOSIS — R80.0 ISOLATED PROTEINURIA WITHOUT SPECIFIC MORPHOLOGIC LESION: Chronic | ICD-10-CM

## 2025-06-12 DIAGNOSIS — D64.9 ANEMIA, UNSPECIFIED TYPE: Chronic | ICD-10-CM

## 2025-06-12 DIAGNOSIS — N18.2 CKD (CHRONIC KIDNEY DISEASE) STAGE 2, GFR 60-89 ML/MIN: ICD-10-CM

## 2025-06-12 PROCEDURE — 3078F DIAST BP <80 MM HG: CPT | Performed by: INTERNAL MEDICINE

## 2025-06-12 PROCEDURE — 99215 OFFICE O/P EST HI 40 MIN: CPT | Performed by: INTERNAL MEDICINE

## 2025-06-12 PROCEDURE — 3074F SYST BP LT 130 MM HG: CPT | Performed by: INTERNAL MEDICINE

## 2025-06-12 RX ORDER — LOSARTAN POTASSIUM 25 MG/1
12.5 TABLET ORAL DAILY
Qty: 90 TABLET | Refills: 3 | Status: SHIPPED | OUTPATIENT
Start: 2025-06-12

## 2025-06-12 RX ORDER — BENZONATATE 100 MG/1
100 CAPSULE ORAL 3 TIMES DAILY PRN
Qty: 60 CAPSULE | Refills: 0 | Status: SHIPPED | OUTPATIENT
Start: 2025-06-12

## 2025-06-12 RX ORDER — AZELASTINE 1 MG/ML
1 SPRAY, METERED NASAL 2 TIMES DAILY
Qty: 30 ML | Refills: 3 | Status: SHIPPED | OUTPATIENT
Start: 2025-06-12

## 2025-06-12 ASSESSMENT — FIBROSIS 4 INDEX: FIB4 SCORE: 1.86

## 2025-06-12 NOTE — ASSESSMENT & PLAN NOTE
Chronic condition.  The patient is presently taking pravastatin.  Patient tolerating treatment well.  Lab test result discussed with the patient

## 2025-06-12 NOTE — ASSESSMENT & PLAN NOTE
This is a new condition.  Blood test show elevated B12 level.  Patient stated that he has been taking vitamin B12.  Advised the patient to discontinue.  Patient asymptomatic.  Recommend to repeat lab test next visit

## 2025-06-12 NOTE — PROGRESS NOTES
PRIMARY CARE CLINIC VISIT        Chief Complaint   Patient presents with    Follow-Up    Lab Results    Cough     Chronic      Psoriatic arthritis  Follow-up diabetes mellitus  CKD 2  Proteinuria  REM sleep behavior disorder  Hyperlipidemia  Acid reflux  Migraine  Anemia  Chronic cough  Elevated vitamin B12 level  Med review    History of Present Illness     Psoriatic arthritis (HCC)  Chronic condition.  Patient followed by rheumatology service.  Patient presently taking methotrexate.  And Remicade treatment.  Patient tolerating medication well.  No new symptoms reported.    Type 2 diabetes mellitus with hyperlipidemia (HCC)  Is a chronic condition.  Patient presently taking glimepiride.  He also take metformin twice daily.  Patient denies significant hypoglycemia.  Recent A1c stable at 7%.    CKD (chronic kidney disease) stage 2, GFR 60-89 ml/min  Chronic condition associated with proteinuria.  GFR in the 60s    Asymptomatic.  Lab test result discussed with the patient.    Proteinuria  This is a chronic condition associated with CKD 2.  Recent urine protein to creatinine ratio 252.    Asymptomatic.  Recommend low protein diet.    Immunosuppressed status (HCC)  Chronic condition.  Patient presently on Remicade treatment for right arthritis as above patient currently asymptomatic.  Patient tolerated the treatment well.    REM sleep behavior disorder  Chronic condition.  Patient followed by sleep specialist.  Patient currently taking clonazepam as needed.  Patient tolerating medication well.  Symptoms are stable.    Dyslipidemia due to type 2 diabetes mellitus (HCC)  Chronic condition.  The patient is presently taking pravastatin.  Patient tolerating treatment well.  Lab test result discussed with the patient    Gastroesophageal reflux disease without esophagitis  This is a chronic condition.  Patient is taking omeprazole.  He denies nausea vomiting or dysphagia.    Migraine  Chronic ongoing condition.  Patient  followed by neurology service.  Patient take Maxalt as needed.  Patient denies recent migraine flareup.  Denied motor weakness or paresthesia.    Anemia  This is a chronic condition.  Patient denies history of bleeding.  Hemoglobin is slightly low since the last 3 years..  Patient presently taking methotrexate  Currently asymptomatic.    Chronic cough  Chronic condition noted since last several months.  Patient recently had chest x-ray which came back negative.  Patient reported intermittent cough.  No phlegm production.  Denies fever or chills.  Denies shortness of breath or wheezing.  Denies chest pain or fluid retention.    High serum vitamin B12  This is a new condition.  Blood test show elevated B12 level.  Patient stated that he has been taking vitamin B12.  Advised the patient to discontinue.  Patient asymptomatic.  Recommend to repeat lab test next visit    Medications Ordered Prior to Encounter[1]     Allergies: Patient has no known allergies.    Current Medications and Prescriptions Ordered in Epic[2]    Past Medical History[3]    Past Surgical History[4]    Family History   Problem Relation Age of Onset    Cancer Mother         lung and brain -  at 88    Heart Disease Father         passed at 62    Hypertension Sister     Hypertension Brother     Cancer Brother        Tobacco Use History[5]    Social History     Substance and Sexual Activity   Alcohol Use Yes    Alcohol/week: 1.2 oz    Types: 2 Glasses of wine per week    Comment: occasional glass of wine       Review of systems  As per HPI above. All other systems reviewed and negative.      Past Medical, Social, and Family history [facility]       LAB DATA:     I have independently reviewed / interpreted labs    Lab Results   Component Value Date/Time    HBA1C 7.0 (H) 2025 09:07 AM    HBA1C 7.4 (A) 2025 10:35 AM    HBA1C 7.0 (H) 08/10/2024 08:27 AM        Lab Results   Component Value Date/Time    WBC 7.3 2025  "09:07 AM    HEMOGLOBIN 13.8 (L) 05/28/2025 09:07 AM    HEMATOCRIT 42.7 05/28/2025 09:07 AM    MCV 97.3 05/28/2025 09:07 AM    PLATELETCT 195 05/28/2025 09:07 AM       Lab Results   Component Value Date/Time    SODIUM 139 05/28/2025 09:07 AM    SODIUM 138 05/28/2025 09:07 AM    POTASSIUM 5.6 (H) 05/28/2025 09:07 AM    POTASSIUM 5.3 05/28/2025 09:07 AM    GLUCOSE 172 (H) 05/28/2025 09:07 AM    GLUCOSE 170 (H) 05/28/2025 09:07 AM    BUN 21 05/28/2025 09:07 AM    BUN 21 05/28/2025 09:07 AM    CREATININE 1.24 05/28/2025 09:07 AM    CREATININE 1.23 05/28/2025 09:07 AM       Lab Results   Component Value Date/Time    CHOLSTRLTOT 159 05/28/2025 09:07 AM    TRIGLYCERIDE 102 05/28/2025 09:07 AM    HDL 60 05/28/2025 09:07 AM    LDL 79 05/28/2025 09:07 AM       Lab Results   Component Value Date/Time    ALTSGPT 25 05/28/2025 09:07 AM    ALTSGPT 26 05/28/2025 09:07 AM          Objective     /68 (BP Location: Left arm, Patient Position: Sitting, BP Cuff Size: Adult)   Pulse 82   Temp 36.6 °C (97.9 °F) (Temporal)   Resp 16   Ht 1.626 m (5' 4\")   Wt 62.2 kg (137 lb 3.2 oz)   SpO2 97%    Body mass index is 23.55 kg/m².    General: alert in no apparent distress.  Cardiovascular: regular rate and rhythm  Pulmonary: lungs : no wheezing   Gastrointestinal: BS present.   Sinus exam no purulent drainage  Oropharynx no exudates      Assessment and Plan     1. Psoriatic arthritis (HCC)  Chronic stable condition.  Continue Remicade treatment and methotrexate as prescribed by rheumatologist.  Continue to monitor    2. Type 2 diabetes mellitus with hyperlipidemia (HCC)  - HEMOGLOBIN A1C; Future  - Basic Metabolic Panel; Future  Chronic condition.  Improved status.  A1c 7%.  Continue glimepiride 2 mg twice daily and metformin 1000 Mg twice daily.  Clinical notes:   -Discussed with the patient goals for Diabetes management:   HbA1C < 7% /  Fasting BG   /  2 hrs post meal BG below 160  -Reviewed pathophysiology of diabetes and " the importance of glycemic control to reduce the risk of diabetes related complications   Microvascular: retinopathy, neuropathy, nephropathy  Macrovascular: heart attack, stroke, peripheral vascular dz  -Advised pt to monitor sugar closely  -Counseled pt the importance of recognizing and treating hypoglycemia.   -The importance of increasing physical activity to improve diabetes control  discussed with the patient.   -Patient was also educated on changing diet and making better choices to help control blood sugar.          3. CKD (chronic kidney disease) stage 2, GFR 60-89 ml/min  Chronic condition.  Stable.  Advised the patient to avoid NSAID.  Continue to monitor.    4. Proteinuria  Chronic condition.  Urine protein to creatinine ratio result discussed with the patient.  Recommend low-dose losartan.  Recommend patient to continue with low protein diet.  To monitor  - losartan (COZAAR) 25 MG Tab; Take 0.5 Tablets by mouth every day.  Dispense: 90 Tablet; Refill: 3  - PROTEIN/CREAT RATIO URINE; Future  - MICROALBUMIN CREAT RATIO URINE; Future    5. Immunosuppressed status (HCC)  Chronic condition.  Patient currently on Remicade treatment.  Patient currently asymptomatic.  Continue to monitor    6. REM sleep behavior disorder  Stable.  Continue follow-up with sleep specialist as directed.  Patient presently taking clonazepam at nighttime as needed.  No significant side effects reported.    7. Dyslipidemia due to type 2 diabetes mellitus (HCC)  - Lipid Profile; Future  Stable condition.  Lipid panel result discussed with the patient.  Continue pravastatin 20 Mg daily    8. Gastroesophageal reflux disease without esophagitis  Chronic stable.  Continue omeprazole 20 Mg daily.    9. Migraine without aura and without status migrainosus, not intractable  Chronic stable condition.  The patient may take rizatriptan tablet daily as needed.  Patient currently asymptomatic.        10. Anemia, unspecified type  Chronic  condition.  Patient asymptomatic.  Continue to monitor.  No history of bleeding reported    11. Chronic cough  Chronic condition.  Unstable.  Recent chest ray negative  Exam today suggestive of allergies with possible postnasal drip.  Rule out other causes.  Recommend allergy to be done however the patient declined due to potential high cost  Recommend  - PULMONARY FUNCTION TESTS -Test requested: Complete Pulmonary Function Test; Future  - azelastine (ASTELIN) 0.1 % nasal spray; Administer 1 Spray into affected nostril(S) 2 times a day.  Dispense: 30 mL; Refill: 3  - benzonatate (TESSALON) 100 MG Cap; Take 1 Capsule by mouth 3 times a day as needed for Cough.  Dispense: 60 Capsule; Refill: 0  Follow-up after testing done    12. High serum vitamin B12  - VITAMIN B12; Future  This is a new condition.  Advised the patient to discontinue vitamin B12.  Repeat lab test next visit                  Time spent:   43  minutes     Reviewed medical records including:  May 13, 2025 rheumatology note  March 20, 2025 urgent care note  February 12, 2025 medical office note  February 2, 2025 urgent care note  November 13, 2024 pulmonary note  November 13, 2024 rheumatology note  Laboratory data May 20, 2025, April 20, 2025, October 20, 2024  Radiology report March 20, 2025    Total time includes face to face time and non-face to face time.  Chart review before the visit, the actual patient visit, and time spent on documentation in the EMR after the visit.  Chart review/prep, review of other providers' records, Independent review of imagings/labs ,  time for history/examination , pt's counseling/education, ordering, prescribing, treatment plan discussed with patient, and care coordination.              Please note that this dictation was created using voice recognition software. I have made every reasonable attempt to correct obvious errors, but I expect that there are errors of grammar and possibly content that I did not discover  before finalizing the note.    Raul Low MD  Internal Medicine  [facility] care [facility]         [1]   Current Outpatient Medications on File Prior to Visit   Medication Sig Dispense Refill    sildenafil citrate (VIAGRA) 25 MG Tab Take 25 mg by mouth every day.      clonazePAM (KLONOPIN) 0.5 MG Tab Take 1-3 Tablets by mouth every evening for 90 days. Indications: Disorder of Rapid Eye Movement Period of Sleep 270 Tablet 0    methotrexate 2.5 MG tablet Take 4 Tablets by mouth every 7 days. 48 Tablet 3    glimepiride (AMARYL) 2 MG Tab TAKE ONE TABLET BY MOUTH TWO TIMES A DAY. 180 Tablet 3    pravastatin (PRAVACHOL) 20 MG Tab TAKE ONE TABLET BY MOUTH EVERY EVENING 90 Tablet 3    omeprazole (PRILOSEC) 20 MG delayed-release capsule TAKE ONE CAPSULE BY MOUTH ONCE DAILY 90 Capsule 3    metformin (GLUCOPHAGE) 1000 MG tablet Take 1 Tablet by mouth 2 times a day with meals. 180 Tablet 3    folic acid (FOLVITE) 1 MG Tab Take 1 Tablet by mouth every day. Except the day of methotrexate. 90 Tablet 3    ipratropium (ATROVENT) 0.03 % Solution       rizatriptan (MAXALT-MLT) 10 MG disintegrating tablet Indications: Migraine Headache      inFLIXimab (REMICADE) 100 MG Recon Soln Infuse 600 mg into a venous catheter every 8 weeks.      Aspirin 81 MG Cap 81 mg.      meloxicam (MOBIC) 15 MG tablet Take one tablet by mouth with food daily. No advil/aleve/motrin/ibuprofen on same day. 30 Tablet 2    Loratadine (KLS ALLERCLEAR PO)       Melatonin 10 MG Cap Take 20 mg by mouth every evening.      Acetaminophen (TYLENOL ARTHRITIS PAIN PO) Take 1,250 mg by mouth every day.       No current facility-administered medications on file prior to visit.   [2]   Current Outpatient Medications Ordered in Epic   Medication Sig Dispense Refill    azelastine (ASTELIN) 0.1 % nasal spray Administer 1 Spray into affected nostril(S) 2 times a day. 30 mL 3    losartan (COZAAR) 25 MG Tab Take 0.5 Tablets by mouth every day. 90 Tablet 3    benzonatate  (TESSALON) 100 MG Cap Take 1 Capsule by mouth 3 times a day as needed for Cough. 60 Capsule 0    sildenafil citrate (VIAGRA) 25 MG Tab Take 25 mg by mouth every day.      clonazePAM (KLONOPIN) 0.5 MG Tab Take 1-3 Tablets by mouth every evening for 90 days. Indications: Disorder of Rapid Eye Movement Period of Sleep 270 Tablet 0    methotrexate 2.5 MG tablet Take 4 Tablets by mouth every 7 days. 48 Tablet 3    glimepiride (AMARYL) 2 MG Tab TAKE ONE TABLET BY MOUTH TWO TIMES A DAY. 180 Tablet 3    pravastatin (PRAVACHOL) 20 MG Tab TAKE ONE TABLET BY MOUTH EVERY EVENING 90 Tablet 3    omeprazole (PRILOSEC) 20 MG delayed-release capsule TAKE ONE CAPSULE BY MOUTH ONCE DAILY 90 Capsule 3    metformin (GLUCOPHAGE) 1000 MG tablet Take 1 Tablet by mouth 2 times a day with meals. 180 Tablet 3    folic acid (FOLVITE) 1 MG Tab Take 1 Tablet by mouth every day. Except the day of methotrexate. 90 Tablet 3    ipratropium (ATROVENT) 0.03 % Solution       rizatriptan (MAXALT-MLT) 10 MG disintegrating tablet Indications: Migraine Headache      inFLIXimab (REMICADE) 100 MG Recon Soln Infuse 600 mg into a venous catheter every 8 weeks.      Aspirin 81 MG Cap 81 mg.      meloxicam (MOBIC) 15 MG tablet Take one tablet by mouth with food daily. No advil/aleve/motrin/ibuprofen on same day. 30 Tablet 2    Loratadine (KLS ALLERCLEAR PO)       Melatonin 10 MG Cap Take 20 mg by mouth every evening.      Acetaminophen (TYLENOL ARTHRITIS PAIN PO) Take 1,250 mg by mouth every day.       No current [facility]-ordered facility-administered medications on file.   [3]   Past Medical History:  Diagnosis Date    Back pain     Back pain     Chickenpox     Diabetes (HCC)     Double vision     Frequent urination     Heartburn     Hyperlipidemia     Influenza     Mumps     Nasal drainage     Painful joint     Psoriatic arthritis (HCC)     Rash     REM sleep behavior disorder     Rheumatoid arthritis (HCC)     Sleep apnea     Sore muscles     Tonsillitis      Wears glasses    [4]   Past Surgical History:  Procedure Laterality Date    CATARACT EXTRACTION WITH IOL Bilateral 2016    FL REMV 2ND CATARACT,CORN-SCLER SECTN     [5]   Social History  Tobacco Use   Smoking Status Former   Smokeless Tobacco Never   Tobacco Comments    quit in 1994

## 2025-06-12 NOTE — ASSESSMENT & PLAN NOTE
Is a chronic condition.  Patient presently taking glimepiride.  He also take metformin twice daily.  Patient denies significant hypoglycemia.  Recent A1c stable at 7%.

## 2025-06-12 NOTE — ASSESSMENT & PLAN NOTE
This is a chronic condition.  Patient is taking omeprazole.  He denies nausea vomiting or dysphagia.

## 2025-06-12 NOTE — ASSESSMENT & PLAN NOTE
Chronic ongoing condition.  Patient followed by neurology service.  Patient take Maxalt as needed.  Patient denies recent migraine flareup.  Denied motor weakness or paresthesia.

## 2025-06-12 NOTE — ASSESSMENT & PLAN NOTE
Chronic condition.  Patient followed by sleep specialist.  Patient currently taking clonazepam as needed.  Patient tolerating medication well.  Symptoms are stable.

## 2025-06-12 NOTE — ASSESSMENT & PLAN NOTE
This is a chronic condition.  Patient denies history of bleeding.  Hemoglobin is slightly low since the last 3 years..  Patient presently taking methotrexate  Currently asymptomatic.

## 2025-06-12 NOTE — ASSESSMENT & PLAN NOTE
Chronic condition noted since last several months.  Patient recently had chest x-ray which came back negative.  Patient reported intermittent cough.  No phlegm production.  Denies fever or chills.  Denies shortness of breath or wheezing.  Denies chest pain or fluid retention.

## 2025-06-12 NOTE — ASSESSMENT & PLAN NOTE
Chronic condition associated with proteinuria.  GFR in the 60s    Asymptomatic.  Lab test result discussed with the patient.

## 2025-06-12 NOTE — ASSESSMENT & PLAN NOTE
Chronic condition.  Patient presently on Remicade treatment for right arthritis as above patient currently asymptomatic.  Patient tolerated the treatment well.

## 2025-06-12 NOTE — ASSESSMENT & PLAN NOTE
This is a chronic condition associated with CKD 2.  Recent urine protein to creatinine ratio 252.    Asymptomatic.  Recommend low protein diet.

## 2025-06-12 NOTE — ASSESSMENT & PLAN NOTE
Chronic condition.  Patient followed by rheumatology service.  Patient presently taking methotrexate.  And Remicade treatment.  Patient tolerating medication well.  No new symptoms reported.

## 2025-06-13 LAB
ALPHA GLOBIN (HBA1, HBA2) DD Q5131: NORMAL
ALPHA THALASSEMIA SEQ Q5132: NORMAL
BETA GLOBIN (HBB) DEL/DUP Q5135: NORMAL
BETA GLOBIN FULL GENE SEQ Q5134: NORMAL
HBG1 + HBG2 FULL MUT ANL BLD/T SEQ: NORMAL
HGB A1 MFR BLD: 96.5 % (ref 95–97.9)
HGB A2 MFR BLD: 3 % (ref 2–3.5)
HGB C MFR BLD: 0 % (ref 0–0)
HGB CASCADE INTERPRETATION L503987A: NORMAL
HGB E MFR BLD: 0 % (ref 0–0)
HGB F MFR BLD: 0.5 % (ref 0–2.1)
HGB FRACT BLD ELPH-IMP: NORMAL
HGB OTHER MFR BLD: 0 % (ref 0–0)
HGB S BLD QL SOLY: NORMAL
HGB S MFR BLD: 0 % (ref 0–0)
PATH INTERP BLD-IMP: NORMAL

## 2025-06-13 NOTE — Clinical Note
REFERRAL APPROVAL NOTICE         Sent on June 13, 2025                   Phuc Mcdowell  [address]  [place] [address]                   Dear Mr. Mcdowell,    After a careful review of the medical information and benefit coverage, [facility] has processed your referral. See below for additional details.    If applicable, you must be actively enrolled with your insurance for coverage of the authorized service. If you have any questions regarding your coverage, please contact your insurance directly.    REFERRAL INFORMATION   Referral #:  [id number]  Referred-To Department    Referred-By Provider:  Pulmonary and Sleep Medicine    Raul Low M.D.   [facility]      [address]  [place] [address]  [phone] [id number]  [facility] [address]  [phone]    Referral Start Date:  06/12/2025  Referral End Date:   06/12/2026             SCHEDULING  If you do not already have an appointment, please call [phone] to make an appointment.     MORE INFORMATION  If you do not already have a [employer] account, sign up at: [email or web address]  You can access your medical information, make appointments, see lab results, billing information, and more.  If you have questions regarding this referral, please contact  the [facility] Referrals department at:             [phone]. Monday - Friday 8:00AM - 5:00PM.     Sincerely,    [facility]

## 2025-06-17 ENCOUNTER — HOSPITAL ENCOUNTER (OUTPATIENT)
Dept: PULMONOLOGY | Facility: MEDICAL CENTER | Age: 80
End: 2025-06-17
Attending: INTERNAL MEDICINE
Payer: MEDICARE

## 2025-06-17 DIAGNOSIS — R05.3 CHRONIC COUGH: ICD-10-CM

## 2025-06-17 PROCEDURE — 94726 PLETHYSMOGRAPHY LUNG VOLUMES: CPT

## 2025-06-17 PROCEDURE — 94060 EVALUATION OF WHEEZING: CPT | Mod: 26 | Performed by: INTERNAL MEDICINE

## 2025-06-17 PROCEDURE — 94726 PLETHYSMOGRAPHY LUNG VOLUMES: CPT | Mod: 26 | Performed by: INTERNAL MEDICINE

## 2025-06-17 PROCEDURE — 94060 EVALUATION OF WHEEZING: CPT

## 2025-06-17 RX ORDER — ALBUTEROL SULFATE 5 MG/ML
2.5 SOLUTION RESPIRATORY (INHALATION)
Status: DISCONTINUED | OUTPATIENT
Start: 2025-06-17 | End: 2025-06-18 | Stop reason: HOSPADM

## 2025-06-17 RX ADMIN — ALBUTEROL SULFATE 2.5 MG: 5 SOLUTION RESPIRATORY (INHALATION) at 14:42

## 2025-06-18 NOTE — PROCEDURES
DATE OF SERVICE:  06/17/2025     PULMONARY FUNCTION TEST INTERPRETATION     REFERRING PROVIDER:  Raul Low MD     INTERPRETING PROVIDER:  Jose Luna III, MD      INTERPRETATION:  1.  There is no significant obstructive ventilatory defect on spirometry.    There is no significant response to bronchodilators.   2.  Lung volumes demonstrate a borderline mildly reduced total lung capacity   of 4.80 L or 82% predicted.  3.  The patient was unable to perform diffusion capacity testing.   4.  Flow volume loop is consistent with a restrictive ventilatory defect.   5.  No prior PFTs for comparison.        ______________________________  MD AMANDA Khan IIIG/BRITTANEY    DD:  06/17/2025 18:27  DT:  06/17/2025 19:47    Job#:  [id number]

## 2025-06-19 ENCOUNTER — OUTPATIENT INFUSION SERVICES (OUTPATIENT)
Dept: ONCOLOGY | Facility: MEDICAL CENTER | Age: 80
End: 2025-06-19
Attending: STUDENT IN AN ORGANIZED HEALTH CARE EDUCATION/TRAINING PROGRAM
Payer: MEDICARE

## 2025-06-19 VITALS
TEMPERATURE: 97.6 F | OXYGEN SATURATION: 95 % | BODY MASS INDEX: 22.04 KG/M2 | HEIGHT: 66 IN | WEIGHT: 137.13 LBS | RESPIRATION RATE: 18 BRPM | SYSTOLIC BLOOD PRESSURE: 109 MMHG | HEART RATE: 91 BPM | DIASTOLIC BLOOD PRESSURE: 63 MMHG

## 2025-06-19 DIAGNOSIS — L40.0 PLAQUE PSORIASIS: ICD-10-CM

## 2025-06-19 DIAGNOSIS — L40.50 PSORIATIC ARTHRITIS (HCC): Primary | ICD-10-CM

## 2025-06-19 PROCEDURE — 700111 HCHG RX REV CODE 636 W/ 250 OVERRIDE (IP): Mod: JZ,TB | Performed by: STUDENT IN AN ORGANIZED HEALTH CARE EDUCATION/TRAINING PROGRAM

## 2025-06-19 PROCEDURE — 96413 CHEMO IV INFUSION 1 HR: CPT

## 2025-06-19 PROCEDURE — 700105 HCHG RX REV CODE 258: Performed by: STUDENT IN AN ORGANIZED HEALTH CARE EDUCATION/TRAINING PROGRAM

## 2025-06-19 RX ORDER — ACETAMINOPHEN 325 MG/1
650 TABLET ORAL ONCE
OUTPATIENT
Start: 2025-08-14

## 2025-06-19 RX ORDER — 0.9 % SODIUM CHLORIDE 0.9 %
3 VIAL (ML) INJECTION PRN
OUTPATIENT
Start: 2025-08-14

## 2025-06-19 RX ORDER — ACETAMINOPHEN 325 MG/1
650 TABLET ORAL ONCE
Status: DISCONTINUED | OUTPATIENT
Start: 2025-06-19 | End: 2025-06-19 | Stop reason: HOSPADM

## 2025-06-19 RX ORDER — SODIUM CHLORIDE 9 MG/ML
INJECTION, SOLUTION INTRAVENOUS CONTINUOUS
OUTPATIENT
Start: 2025-08-14

## 2025-06-19 RX ORDER — SODIUM CHLORIDE 9 MG/ML
INJECTION, SOLUTION INTRAVENOUS CONTINUOUS
Status: DISCONTINUED | OUTPATIENT
Start: 2025-06-19 | End: 2025-06-19 | Stop reason: HOSPADM

## 2025-06-19 RX ORDER — 0.9 % SODIUM CHLORIDE 0.9 %
VIAL (ML) INJECTION PRN
OUTPATIENT
Start: 2025-08-14

## 2025-06-19 RX ORDER — METHYLPREDNISOLONE SODIUM SUCCINATE 125 MG/2ML
125 INJECTION, POWDER, LYOPHILIZED, FOR SOLUTION INTRAMUSCULAR; INTRAVENOUS PRN
OUTPATIENT
Start: 2025-08-14

## 2025-06-19 RX ORDER — EPINEPHRINE 1 MG/ML(1)
0.5 AMPUL (ML) INJECTION PRN
OUTPATIENT
Start: 2025-08-14

## 2025-06-19 RX ORDER — 0.9 % SODIUM CHLORIDE 0.9 %
10 VIAL (ML) INJECTION PRN
OUTPATIENT
Start: 2025-08-14

## 2025-06-19 RX ORDER — DIPHENHYDRAMINE HYDROCHLORIDE 50 MG/ML
50 INJECTION, SOLUTION INTRAMUSCULAR; INTRAVENOUS PRN
OUTPATIENT
Start: 2025-08-14

## 2025-06-19 RX ORDER — DIPHENHYDRAMINE HYDROCHLORIDE 50 MG/ML
25 INJECTION, SOLUTION INTRAMUSCULAR; INTRAVENOUS ONCE
Status: DISCONTINUED | OUTPATIENT
Start: 2025-06-19 | End: 2025-06-19 | Stop reason: HOSPADM

## 2025-06-19 RX ADMIN — SODIUM CHLORIDE: 9 INJECTION, SOLUTION INTRAVENOUS at 15:00

## 2025-06-19 RX ADMIN — INFLIXIMAB-AXXQ 300 MG: 100 INJECTION, POWDER, LYOPHILIZED, FOR SOLUTION INTRAVENOUS at 15:06

## 2025-06-19 ASSESSMENT — FIBROSIS 4 INDEX: FIB4 SCORE: 1.86

## 2025-06-20 NOTE — PROGRESS NOTES
Phuc presented to [facility] for every 8 week Infliximab. Pt reported no new concerns today and denied any current s/sx of infection or open wounds. No live vaccines in the last 4 weeks. PIV started to left FA, brisk blood return observed and flushed easily. Infliximab infused over one with filter in place with no s/sx of adverse reaction. PIV flushed, removed and gauze and Coban dressing placed. Pt has future appointments. Pt discharged to home in good condition.

## 2025-07-28 ENCOUNTER — TELEPHONE (OUTPATIENT)
Dept: RHEUMATOLOGY | Facility: MEDICAL CENTER | Age: 80
End: 2025-07-28
Payer: MEDICARE

## 2025-07-28 DIAGNOSIS — L40.50 PSORIATIC ARTHRITIS (HCC): Primary | ICD-10-CM

## 2025-07-28 DIAGNOSIS — L40.0 PLAQUE PSORIASIS: ICD-10-CM

## 2025-07-28 RX ORDER — METHOTREXATE 2.5 MG/1
15 TABLET ORAL
Qty: 72 TABLET | Refills: 3 | Status: SHIPPED | OUTPATIENT
Start: 2025-07-28

## 2025-07-28 NOTE — TELEPHONE ENCOUNTER
Last month you reduced pt's methotrexate dosage from 6 to 4 tablets weekly. Patient is calling stating he has continued to take the 6 tablets weekly because he has been pushing back his infusions and he now needs a new prescription for 6 tablets instead of the 4 that you ordered last month because he is running out and the pharmacy told him they need a new Rx for the 6 tablets. Please advise.

## 2025-07-29 NOTE — ASSESSMENT & PLAN NOTE
Chronic condition.  Patient currently taking metformin 1000 Mg twice daily and glimepiride 2 mg twice daily.  Patient tolerating medication well.  Recent A1c 6.9%.  Patient denies significant hypoglycemic symptoms.  
Chronic condition.  Patient followed by rheumatology service.  Patient is currently taking being treated with methotrexate and Remicade injection every 8 weeks.  Patient has noted improvement in his condition.  The patient tolerating treatment well.  
Chronic condition.  Patient followed by sleep specialist.  Patient is currently taking Klonopin 1.5 Mg at bedtime along with melatonin.  No significant side effects reported.  
Chronic condition.  The patient being treated with Remicade.  Patient currently asymptomatic.  
Chronic condition.  The patient being treated with omeprazole 20 Mg daily.  Patient denies nausea vomiting dysphagia or unexplained weight loss.  
Chronic ongoing condition.  The patient is taking pravastatin 20 Mg daily.  Patient tolerating medication well.  Recent lipid panel result discussed with the patient.  
This is a new condition.  The patient reported left low back pain since the last few weeks.  He denies recent trauma or injury.  The patient denies motor weakness paresthesia.  He denies unexplained weight loss or night sweats.  
actual

## 2025-08-21 ENCOUNTER — OUTPATIENT INFUSION SERVICES (OUTPATIENT)
Dept: ONCOLOGY | Facility: MEDICAL CENTER | Age: 80
End: 2025-08-21
Attending: STUDENT IN AN ORGANIZED HEALTH CARE EDUCATION/TRAINING PROGRAM
Payer: MEDICARE

## 2025-08-21 VITALS
HEIGHT: 66 IN | DIASTOLIC BLOOD PRESSURE: 67 MMHG | WEIGHT: 138.89 LBS | TEMPERATURE: 97.1 F | BODY MASS INDEX: 22.32 KG/M2 | SYSTOLIC BLOOD PRESSURE: 132 MMHG | RESPIRATION RATE: 16 BRPM | HEART RATE: 82 BPM | OXYGEN SATURATION: 97 %

## 2025-08-21 DIAGNOSIS — L40.0 PLAQUE PSORIASIS: ICD-10-CM

## 2025-08-21 DIAGNOSIS — L40.50 PSORIATIC ARTHRITIS (HCC): Primary | ICD-10-CM

## 2025-08-21 PROCEDURE — 700111 HCHG RX REV CODE 636 W/ 250 OVERRIDE (IP): Mod: JZ,TB | Performed by: STUDENT IN AN ORGANIZED HEALTH CARE EDUCATION/TRAINING PROGRAM

## 2025-08-21 PROCEDURE — 96365 THER/PROPH/DIAG IV INF INIT: CPT

## 2025-08-21 PROCEDURE — 700105 HCHG RX REV CODE 258: Performed by: STUDENT IN AN ORGANIZED HEALTH CARE EDUCATION/TRAINING PROGRAM

## 2025-08-21 RX ORDER — 0.9 % SODIUM CHLORIDE 0.9 %
VIAL (ML) INJECTION PRN
OUTPATIENT
Start: 2025-10-09

## 2025-08-21 RX ORDER — 0.9 % SODIUM CHLORIDE 0.9 %
3 VIAL (ML) INJECTION PRN
OUTPATIENT
Start: 2025-10-09

## 2025-08-21 RX ORDER — EPINEPHRINE 1 MG/ML(1)
0.5 AMPUL (ML) INJECTION PRN
OUTPATIENT
Start: 2025-10-09

## 2025-08-21 RX ORDER — 0.9 % SODIUM CHLORIDE 0.9 %
10 VIAL (ML) INJECTION PRN
OUTPATIENT
Start: 2025-10-09

## 2025-08-21 RX ORDER — DIPHENHYDRAMINE HYDROCHLORIDE 50 MG/ML
25 INJECTION, SOLUTION INTRAMUSCULAR; INTRAVENOUS ONCE
Start: 2025-10-09 | End: 2025-10-09

## 2025-08-21 RX ORDER — ACETAMINOPHEN 325 MG/1
650 TABLET ORAL ONCE
Status: DISCONTINUED | OUTPATIENT
Start: 2025-08-21 | End: 2025-08-21 | Stop reason: HOSPADM

## 2025-08-21 RX ORDER — DIPHENHYDRAMINE HYDROCHLORIDE 50 MG/ML
50 INJECTION, SOLUTION INTRAMUSCULAR; INTRAVENOUS PRN
OUTPATIENT
Start: 2025-10-09

## 2025-08-21 RX ORDER — ACETAMINOPHEN 325 MG/1
650 TABLET ORAL ONCE
OUTPATIENT
Start: 2025-10-09

## 2025-08-21 RX ORDER — SODIUM CHLORIDE 9 MG/ML
INJECTION, SOLUTION INTRAVENOUS CONTINUOUS
OUTPATIENT
Start: 2025-10-09

## 2025-08-21 RX ORDER — DIPHENHYDRAMINE HYDROCHLORIDE 50 MG/ML
25 INJECTION, SOLUTION INTRAMUSCULAR; INTRAVENOUS ONCE
Status: DISCONTINUED | OUTPATIENT
Start: 2025-08-21 | End: 2025-08-21 | Stop reason: HOSPADM

## 2025-08-21 RX ORDER — SODIUM CHLORIDE 9 MG/ML
INJECTION, SOLUTION INTRAVENOUS CONTINUOUS
Status: DISCONTINUED | OUTPATIENT
Start: 2025-08-21 | End: 2025-08-21 | Stop reason: HOSPADM

## 2025-08-21 RX ORDER — METHYLPREDNISOLONE SODIUM SUCCINATE 125 MG/2ML
125 INJECTION, POWDER, LYOPHILIZED, FOR SOLUTION INTRAMUSCULAR; INTRAVENOUS PRN
OUTPATIENT
Start: 2025-10-09

## 2025-08-21 RX ADMIN — SODIUM CHLORIDE 300 MG: 9 INJECTION, SOLUTION INTRAVENOUS at 14:15

## 2025-08-21 ASSESSMENT — FIBROSIS 4 INDEX: FIB4 SCORE: 1.86
